# Patient Record
Sex: FEMALE | Race: WHITE | Employment: OTHER | URBAN - NONMETROPOLITAN AREA
[De-identification: names, ages, dates, MRNs, and addresses within clinical notes are randomized per-mention and may not be internally consistent; named-entity substitution may affect disease eponyms.]

---

## 2017-01-19 LAB
LEFT VENTRICULAR EJECTION FRACTION MODE: NORMAL
LV EF: 55 %

## 2018-10-30 ENCOUNTER — OFFICE VISIT (OUTPATIENT)
Dept: CARDIOLOGY CLINIC | Age: 83
End: 2018-10-30
Payer: MEDICARE

## 2018-10-30 VITALS
HEIGHT: 56 IN | HEART RATE: 71 BPM | WEIGHT: 183 LBS | BODY MASS INDEX: 41.17 KG/M2 | RESPIRATION RATE: 18 BRPM | DIASTOLIC BLOOD PRESSURE: 70 MMHG | SYSTOLIC BLOOD PRESSURE: 138 MMHG

## 2018-10-30 DIAGNOSIS — R60.9 PERIPHERAL EDEMA: ICD-10-CM

## 2018-10-30 DIAGNOSIS — Z03.89 CORONARY ARTERY DISEASE (CAD) EXCLUDED: Primary | ICD-10-CM

## 2018-10-30 DIAGNOSIS — I25.10 ATHEROSCLEROSIS OF NATIVE CORONARY ARTERY OF NATIVE HEART WITHOUT ANGINA PECTORIS: ICD-10-CM

## 2018-10-30 PROBLEM — R60.0 PERIPHERAL EDEMA: Status: ACTIVE | Noted: 2018-10-30

## 2018-10-30 PROCEDURE — 1101F PT FALLS ASSESS-DOCD LE1/YR: CPT | Performed by: INTERNAL MEDICINE

## 2018-10-30 PROCEDURE — 1123F ACP DISCUSS/DSCN MKR DOCD: CPT | Performed by: INTERNAL MEDICINE

## 2018-10-30 PROCEDURE — 93000 ELECTROCARDIOGRAM COMPLETE: CPT | Performed by: INTERNAL MEDICINE

## 2018-10-30 PROCEDURE — G8417 CALC BMI ABV UP PARAM F/U: HCPCS | Performed by: INTERNAL MEDICINE

## 2018-10-30 PROCEDURE — 1090F PRES/ABSN URINE INCON ASSESS: CPT | Performed by: INTERNAL MEDICINE

## 2018-10-30 PROCEDURE — G8484 FLU IMMUNIZE NO ADMIN: HCPCS | Performed by: INTERNAL MEDICINE

## 2018-10-30 PROCEDURE — 1036F TOBACCO NON-USER: CPT | Performed by: INTERNAL MEDICINE

## 2018-10-30 PROCEDURE — G8427 DOCREV CUR MEDS BY ELIG CLIN: HCPCS | Performed by: INTERNAL MEDICINE

## 2018-10-30 PROCEDURE — 4040F PNEUMOC VAC/ADMIN/RCVD: CPT | Performed by: INTERNAL MEDICINE

## 2018-10-30 PROCEDURE — G8598 ASA/ANTIPLAT THER USED: HCPCS | Performed by: INTERNAL MEDICINE

## 2018-10-30 PROCEDURE — 99204 OFFICE O/P NEW MOD 45 MIN: CPT | Performed by: INTERNAL MEDICINE

## 2018-10-30 RX ORDER — PRAVASTATIN SODIUM 40 MG
TABLET ORAL
COMMUNITY
Start: 2009-06-19 | End: 2018-10-30

## 2018-12-14 LAB
CREATININE: 1.3 MG/DL
POTASSIUM (K+): 4.5

## 2019-04-22 RX ORDER — UBIDECARENONE 75 MG
CAPSULE ORAL
COMMUNITY
End: 2019-04-22 | Stop reason: SDUPTHER

## 2019-04-22 RX ORDER — SERTRALINE HYDROCHLORIDE 25 MG/1
TABLET, FILM COATED ORAL
Refills: 2 | COMMUNITY
Start: 2019-03-25 | End: 2019-05-14 | Stop reason: SDUPTHER

## 2019-04-22 RX ORDER — TORSEMIDE 10 MG/1
TABLET ORAL
COMMUNITY
Start: 2018-10-12 | End: 2019-06-21 | Stop reason: SDUPTHER

## 2019-05-14 ENCOUNTER — OFFICE VISIT (OUTPATIENT)
Dept: FAMILY MEDICINE CLINIC | Age: 84
End: 2019-05-14
Payer: MEDICARE

## 2019-05-14 VITALS
HEART RATE: 65 BPM | DIASTOLIC BLOOD PRESSURE: 78 MMHG | OXYGEN SATURATION: 95 % | HEIGHT: 60 IN | BODY MASS INDEX: 37.69 KG/M2 | TEMPERATURE: 97 F | SYSTOLIC BLOOD PRESSURE: 138 MMHG | WEIGHT: 192 LBS

## 2019-05-14 VITALS
DIASTOLIC BLOOD PRESSURE: 86 MMHG | BODY MASS INDEX: 39.31 KG/M2 | SYSTOLIC BLOOD PRESSURE: 142 MMHG | WEIGHT: 195 LBS | TEMPERATURE: 97.6 F | HEART RATE: 84 BPM | HEIGHT: 59 IN

## 2019-05-14 DIAGNOSIS — R60.9 PERIPHERAL EDEMA: ICD-10-CM

## 2019-05-14 DIAGNOSIS — E78.2 MIXED HYPERLIPIDEMIA: ICD-10-CM

## 2019-05-14 DIAGNOSIS — I10 ESSENTIAL HYPERTENSION: Primary | ICD-10-CM

## 2019-05-14 DIAGNOSIS — M15.9 PRIMARY OSTEOARTHRITIS INVOLVING MULTIPLE JOINTS: ICD-10-CM

## 2019-05-14 DIAGNOSIS — F41.1 GENERALIZED ANXIETY DISORDER: ICD-10-CM

## 2019-05-14 DIAGNOSIS — G89.4 CHRONIC PAIN SYNDROME: ICD-10-CM

## 2019-05-14 DIAGNOSIS — K21.9 GASTROESOPHAGEAL REFLUX DISEASE WITHOUT ESOPHAGITIS: ICD-10-CM

## 2019-05-14 DIAGNOSIS — Z86.73 HISTORY OF STROKE WITHOUT RESIDUAL DEFICITS: ICD-10-CM

## 2019-05-14 DIAGNOSIS — N18.30 CHRONIC RENAL INSUFFICIENCY, STAGE III (MODERATE) (HCC): ICD-10-CM

## 2019-05-14 PROBLEM — M19.90 OSTEOARTHRITIS: Status: ACTIVE | Noted: 2019-05-14

## 2019-05-14 PROCEDURE — 99214 OFFICE O/P EST MOD 30 MIN: CPT | Performed by: NURSE PRACTITIONER

## 2019-05-14 RX ORDER — PANTOPRAZOLE SODIUM 40 MG/1
40 TABLET, DELAYED RELEASE ORAL DAILY
COMMUNITY
End: 2019-05-14 | Stop reason: SDUPTHER

## 2019-05-14 RX ORDER — CLOPIDOGREL BISULFATE 75 MG/1
75 TABLET ORAL DAILY
Qty: 90 TABLET | Refills: 2 | Status: SHIPPED | OUTPATIENT
Start: 2019-05-14 | End: 2019-05-14

## 2019-05-14 RX ORDER — PANTOPRAZOLE SODIUM 40 MG/1
40 TABLET, DELAYED RELEASE ORAL DAILY
Qty: 90 TABLET | Refills: 2 | Status: SHIPPED | OUTPATIENT
Start: 2019-05-14 | End: 2019-11-19 | Stop reason: SDUPTHER

## 2019-05-14 RX ORDER — HYDROCODONE BITARTRATE AND ACETAMINOPHEN 7.5; 325 MG/1; MG/1
1 TABLET ORAL 2 TIMES DAILY PRN
Qty: 45 TABLET | Refills: 0 | Status: SHIPPED | OUTPATIENT
Start: 2019-05-14 | End: 2019-06-21 | Stop reason: SDUPTHER

## 2019-05-14 RX ORDER — PRAVASTATIN SODIUM 40 MG
60 TABLET ORAL DAILY
Qty: 90 TABLET | Refills: 2 | Status: SHIPPED | OUTPATIENT
Start: 2019-05-14 | End: 2019-12-20 | Stop reason: SDUPTHER

## 2019-05-14 RX ORDER — METOPROLOL SUCCINATE 25 MG/1
50 TABLET, EXTENDED RELEASE ORAL NIGHTLY
Qty: 180 TABLET | Refills: 2 | Status: SHIPPED | OUTPATIENT
Start: 2019-05-14 | End: 2019-11-19 | Stop reason: SDUPTHER

## 2019-05-14 RX ORDER — LIDOCAINE 50 MG/G
1 PATCH TOPICAL DAILY
COMMUNITY
End: 2019-06-21

## 2019-05-14 RX ORDER — CLOPIDOGREL BISULFATE 75 MG/1
75 TABLET ORAL DAILY
Qty: 90 TABLET | Refills: 2 | Status: SHIPPED | OUTPATIENT
Start: 2019-05-14 | End: 2019-11-19 | Stop reason: SDUPTHER

## 2019-05-14 RX ORDER — SERTRALINE HYDROCHLORIDE 25 MG/1
TABLET, FILM COATED ORAL
Qty: 60 TABLET | Refills: 2 | Status: SHIPPED | OUTPATIENT
Start: 2019-05-14 | End: 2019-08-23 | Stop reason: SDUPTHER

## 2019-05-14 ASSESSMENT — PATIENT HEALTH QUESTIONNAIRE - PHQ9
SUM OF ALL RESPONSES TO PHQ QUESTIONS 1-9: 2
1. LITTLE INTEREST OR PLEASURE IN DOING THINGS: 1
SUM OF ALL RESPONSES TO PHQ9 QUESTIONS 1 & 2: 2
2. FEELING DOWN, DEPRESSED OR HOPELESS: 1
1. LITTLE INTEREST OR PLEASURE IN DOING THINGS: 1
SUM OF ALL RESPONSES TO PHQ QUESTIONS 1-9: 2
2. FEELING DOWN, DEPRESSED OR HOPELESS: 1
SUM OF ALL RESPONSES TO PHQ QUESTIONS 1-9: 2
SUM OF ALL RESPONSES TO PHQ QUESTIONS 1-9: 2
SUM OF ALL RESPONSES TO PHQ9 QUESTIONS 1 & 2: 2

## 2019-05-14 ASSESSMENT — ENCOUNTER SYMPTOMS
ALLERGIC/IMMUNOLOGIC NEGATIVE: 1
CHEST TIGHTNESS: 0
DIARRHEA: 0
COUGH: 0
CONSTIPATION: 0
ABDOMINAL PAIN: 0
BACK PAIN: 1
RHINORRHEA: 1
NAUSEA: 0
EYE DISCHARGE: 1
SHORTNESS OF BREATH: 0

## 2019-05-14 NOTE — PROGRESS NOTES
2019     Luis Smith (:  1929) is a 80 y.o. female, here for evaluation of the following medical concerns:  Chief Complaint   Patient presents with    Medication Refill    Back Pain        HPI:  80 y.o. female presents  For med refill and check up  Patient accompanied by Anselmo Whitten    HPI: c/o ears itching and picks her SK constantly. Trying to loose weight -discussed diet  Needs referral since cardiologist retired due this 2019- referral in  Saw eye doctor and still having issues especially with watering-suggest some saline washes  Swelling in legs very minimal  Sleeping in chair and bed, feeling more rested but has wild dreams sometimes  Still lightheaded on occasion  Having heartburn-better with small meals  Botox was doing well with urinary issues , now wearing off but does not want to go back  Feet will burn and sting. Has seen vascular as well. Legs continually feel \"frozen\"  Seeing counselor and still doing ok. But still worries constantly  Got flu shot  Has fall plan and surrogate decision maker    Health Maintenance:  carotid us - (2013) no hemodynamically sig stenosis  Swallowing study/upper GI and Ba enema -  Normal except for delayed emptying to colon  ct scan of head - (2015) no acute infarct, age related ischemic changes  2d echo - (2015) normal left ventircualr size with mild concentric left ventricular hypertrophy, stage 2 diastolic  dysfunction, mild mitral valve insuff, mod lae.   carotid us - (2015) <50% narrowing b/l carotid arteries  mri scan of brain - (2015) old lacunar infarcts left thalamus and basal ganglia, periventricular white matter disease  carotid us -  <30% stenosis  ct scan of head -  Neg except microvascular changes  Duplex venous ultrasound -  negative  carotid us -  negative  Mra -  atrophy but otherwise negative  carotid us -  <50%stenosis both internal arteries  ct scan of head -  Chronic sm vessel changes and infarct  2d echo - 1-2017 normal left ventricular size with mild concentric left ventricular hypertrophy, stage 2 diastolic  dysfunction, mild mitral valve insuff, mod lae, aortic sclerosis. ? effusion but determined insignificant as a poor study. ct scan of head - (2/2017) no acute infarct, ischemic white matter changes    Past Medical History:   Diagnosis Date    Anxiety with depression     Arrhythmia     PAF    CAD (coronary artery disease)     Chronic kidney disease     stage 3    Chronic pain syndrome     [facility] ([facility] [address]     History of CVA (cerebrovascular accident) without residual deficits     Hyperlipidemia     Hypertension     Osteoarthritis     Seizures (HCC)     CHILDHOOD    Shingles     Sleep apnea     TIA (transient ischemic attack)        Current Outpatient Medications on File Prior to Visit   Medication Sig Dispense Refill    vitamin D (CHOLECALCIFEROL) 1000 UNIT TABS tablet Take by mouth      Multiple Vitamins-Minerals (CENTRUM SILVER 50+MEN PO) Take by mouth      torsemide (DEMADEX) 10 MG tablet Take by mouth      UNABLE TO FIND Unknown name -- med for overactive bladder.  aspirin 325 MG tablet Take 325 mg by mouth daily      Cyanocobalamin (B-12) 2500 MCG TABS Take 1 tablet by mouth daily      donepezil (ARICEPT) 5 MG tablet Take 1 tablet by mouth daily (with breakfast) 30 tablet 6     No current facility-administered medications on file prior to visit. Allergies   Allergen Reactions    Bactrim [Sulfamethoxazole-Trimethoprim]     Lyrica [Pregabalin]     Prednisone     Sulfa Antibiotics Rash       No family history on file.     Past Surgical History:   Procedure Laterality Date    CARPAL TUNNEL RELEASE      DIAGNOSTIC CARDIAC CATH LAB PROCEDURE      drug eluting stent    HYSTERECTOMY      LUMBAR LAMINECTOMY         Social History     Tobacco Use    Smoking status: Passive Smoke Exposure - Never Smoker    Smokeless tobacco: Never Used  Tobacco comment:  had smoked. Substance Use Topics    Alcohol use: Not on file    Drug use: Not on file       ROS:    maker  Refuses mammogram.  ROS:  Const: Denies malaise. General health stated as poor. CV: Reports CAD, hyperlipidemia and hypertension, but denies fatigue and orthopnea. Resp: Denies cough, SOB and wheezing. GI: Denies abdominal pain, constipation, nausea and vomiting. Musculo: Reports ambulatory dysfunction, arthralgias, arthritis, myalgia, back pain, right shoulder pain, left hip pain and  joint pain. Neuro: Reports difficulty with balance, dizziness and transient ischemic attack but denies paralysis, paresthesia, room  spinning, vertigo and muscular weakness  Hx:On 1- she was fine at 9pm and at 2315 \"walked\" over to son's house mumbling because couldn't get phone  dialed. Family noted rt side facial droop. Stated she has left leg numbness and tingling as well. Family found BP elevated  and . Brought to ED and while there , sx resolved. Had just had the EEG done by Dr Rhett Palacios earlier that day and had seen her on 1-17-17  Had echo and carotid doppler and CT all unchanged. on 2-3-2017 back in hospital, was seen by trt in [place] due to TIA(weakness and vision disturbance) symptomatology,  sent to [facility] for evaluation, BW, Ct scan was done, no sig changes noted. Did see Dr Yoav Nicole neurologist at [facility] [place] 2017 and he stated the same things that have been stated to her by all of her  provider's there is no carotid artery issue, there is sm vessel disease, continue medications, the feeling in her head may  never change. Psych: Reports anxiety, memory loss and  Review of Systems   Constitutional: Positive for activity change and fatigue. States health is poor   HENT: Positive for congestion and rhinorrhea. Eyes: Positive for discharge. Respiratory: Negative for cough, chest tightness and shortness of breath.     Cardiovascular: Positive for palpitations and leg swelling. Negative for chest pain. CAD, HTN, hyperlipidemia   Gastrointestinal: Negative for abdominal pain, constipation, diarrhea and nausea. Endocrine: Negative. Genitourinary: Positive for enuresis and frequency. Musculoskeletal: Positive for arthralgias, back pain, gait problem, joint swelling and myalgias. Left hip, right shoulder   Skin: Negative. Allergic/Immunologic: Negative. Neurological: Positive for weakness, light-headedness and numbness. Negative for dizziness, tremors and headaches. Reports difficulty with balance, dizziness and transient ischemic attack but denies paralysis, paresthesia, room  spinning, vertigo and muscular weakness  Hx:On 1- she was fine at 9pm and at 2315 \"walked\" over to son's house mumbling because couldn't get phone  dialed. Family noted rt side facial droop. Stated she has left leg numbness and tingling as well. Family found BP elevated  and . Brought to ED and while there , sx resolved. Had just had the EEG done by Dr Iris Navarro earlier that day and had seen her on 1-17-17  Had echo and carotid doppler and CT all unchanged. on 2-3-2017 back in hospital, was seen by trt in [facility] due to TIA(weakness and vision disturbance) symptomatology,  sent to [facility] for evaluation, BW, Ct scan was done, no sig changes noted. Did see Dr Liza Dale neurologist at [facility] [place] 2017 and he stated the same things that have been stated to her by all of her  provider's there is no carotid artery issue, there is sm vessel disease, continue medications, the feeling in her head may  never change. Hematological: Bruises/bleeds easily. Psychiatric/Behavioral: Positive for decreased concentration, dysphoric mood and sleep disturbance. The patient is nervous/anxious.             Vitals:    05/14/19 1111   BP: 138/78   Pulse: 65   Temp: 97 °F (36.1 °C)   SpO2: 95%   Weight: 192 lb (87.1 kg)   Height: 5' (1.524 m)     Estimated body mass index is 37.5 kg/m² as calculated from the following:    Height as of this encounter: 5' (1.524 m). Weight as of this encounter: 192 lb (87.1 kg). VS:  Blood pressure 138/78, pulse 65, temperature 97 °F (36.1 °C), height 5' (1.524 m), weight 192 lb (87.1 kg), SpO2 95 %. Physical Exam   Constitutional: She is oriented to person, place, and time. She appears well-developed and well-nourished. No distress. Younger than stated age   HENT:   Head: Normocephalic and atraumatic. Right Ear: Hearing, tympanic membrane, external ear and ear canal normal.   Left Ear: Hearing, tympanic membrane, external ear and ear canal normal.   Nose: Nose normal. No mucosal edema or rhinorrhea. Mouth/Throat: Oropharynx is clear and moist and mucous membranes are normal. No oropharyngeal exudate. Eyes: Pupils are equal, round, and reactive to light. Conjunctivae and EOM are normal.   Neck: Normal range of motion. Neck supple. No JVD present. Carotid bruit is present. No thyromegaly present. Cardiovascular: Normal rate, regular rhythm, normal heart sounds and intact distal pulses. No murmur heard. Pulmonary/Chest: Effort normal and breath sounds normal. No respiratory distress. She has no wheezes. She has no rales. Abdominal: Soft. Bowel sounds are normal. She exhibits no distension and no mass. There is no hepatosplenomegaly. There is no tenderness. colostomy   Musculoskeletal: She exhibits no edema. Musculo: Using walker today and moving swiftly! Upper Extremities: DJD changes and limitation with movement of the  upper extremities bilaterally Hand grasps seem equal. Still some trigger of left 2nd finger. OA changes significant. Permanent incomplete closure of right hand. Spinal curvature Lower Extremities: DJD changes, limitation and pain on  resistance of the left lower extremity. Left hip is higher than the right hip., trace edema of both LE slightly >rt than left. Neurological: She is alert and oriented to person, place, and time.  She discussed the side effects and narcotic policy with this patient and the patient has demonstrated understanding. A follow up appointment will be scheduled        No orders of the defined types were placed in this encounter. Return in about 1 month (around 6/11/2019) for medication refill, Routine office visit. An electronic signature was used to authenticate this note.     --LILIA Cotto - CNP on 5/14/2019 at 5:06 PM

## 2019-05-17 DIAGNOSIS — F41.1 GENERALIZED ANXIETY DISORDER: ICD-10-CM

## 2019-05-17 RX ORDER — METOPROLOL SUCCINATE 25 MG/1
12.5 TABLET, EXTENDED RELEASE ORAL NIGHTLY
Qty: 45 TABLET | Refills: 2 | OUTPATIENT
Start: 2019-05-17

## 2019-05-17 RX ORDER — PRAVASTATIN SODIUM 40 MG
60 TABLET ORAL DAILY
Qty: 135 TABLET | Refills: 2 | OUTPATIENT
Start: 2019-05-17

## 2019-05-17 RX ORDER — SERTRALINE HYDROCHLORIDE 25 MG/1
25 TABLET, FILM COATED ORAL DAILY
Qty: 90 TABLET | Refills: 2 | OUTPATIENT
Start: 2019-05-17

## 2019-05-17 NOTE — TELEPHONE ENCOUNTER
Reji Rosado called about the Rxs that were sent over on 5/14:      Pravastatin 40mg 1 1/2 po qd was sent w/ a quantity of #90. New Rx is in the que for # 135 w/ 2 refills     Metoprolol succinate 25mg take 2 tabs nightly Take 0.5mg daily # 180 w/ 2 refills. Previous Rx from Cristel Cullen states that pt is taking 1/2 po q hs. New Rx is in the que for #45 w/ 2 refiils    Sertraline 25mg 1/2 qd #60 w/ 2 refills/. Per [facility] pt was taking 1/2 @hs x 2 nights then increased to 1 po qhs. New Rx for #90 w/ 2 refills is in the que.

## 2019-06-10 ENCOUNTER — PROCEDURE VISIT (OUTPATIENT)
Dept: PODIATRY | Age: 84
End: 2019-06-10
Payer: MEDICARE

## 2019-06-10 VITALS
DIASTOLIC BLOOD PRESSURE: 74 MMHG | WEIGHT: 192 LBS | BODY MASS INDEX: 37.5 KG/M2 | SYSTOLIC BLOOD PRESSURE: 134 MMHG | TEMPERATURE: 96.9 F

## 2019-06-10 DIAGNOSIS — M79.674 PAIN IN TOE OF RIGHT FOOT: ICD-10-CM

## 2019-06-10 DIAGNOSIS — B35.1 TINEA UNGUIUM: Primary | ICD-10-CM

## 2019-06-10 DIAGNOSIS — I73.9 PERIPHERAL VASCULAR DISEASE, UNSPECIFIED (HCC): ICD-10-CM

## 2019-06-10 DIAGNOSIS — M79.675 PAIN IN LEFT TOE(S): ICD-10-CM

## 2019-06-10 DIAGNOSIS — R26.2 DIFFICULTY WALKING: ICD-10-CM

## 2019-06-10 PROCEDURE — 11721 DEBRIDE NAIL 6 OR MORE: CPT | Performed by: PODIATRIST

## 2019-06-10 ASSESSMENT — ENCOUNTER SYMPTOMS
RESPIRATORY NEGATIVE: 1
GASTROINTESTINAL NEGATIVE: 1
EYES NEGATIVE: 1

## 2019-06-10 NOTE — PROGRESS NOTES
tablet Take 1 tablet by mouth daily 90 tablet 2    vitamin D (CHOLECALCIFEROL) 1000 UNIT TABS tablet Take by mouth      Multiple Vitamins-Minerals (CENTRUM SILVER 50+MEN PO) Take by mouth      torsemide (DEMADEX) 10 MG tablet Take by mouth      UNABLE TO FIND Unknown name -- med for overactive bladder.  aspirin 325 MG tablet Take 325 mg by mouth daily      Cyanocobalamin (B-12) 2500 MCG TABS Take 1 tablet by mouth daily      donepezil (ARICEPT) 5 MG tablet Take 1 tablet by mouth daily (with breakfast) 30 tablet 6     No current facility-administered medications on file prior to visit. Review of Systems   Constitutional: Negative. HENT: Negative. Eyes: Negative. Respiratory: Negative. Cardiovascular: Negative. Gastrointestinal: Negative. Endocrine: Negative. Genitourinary: Negative. Musculoskeletal: Negative. Objective:  General: AAO x 3 in NAD.     Derm  Toenail Description  Sites of Onychomycosis Involvement (Check affected area)  [x] [x] [x] [x] [x] [x] [x] [x] [x] [x]  5 4 3 2 1 1 2 3 4 5                          Right                                        Left    Thickness  [x] [x] [x] [x] [x] [x] [x] [x] [x] [x]  5 4 3 2 1 1 2 3 4 5                         Right                                        Left    Dystrophic Changes   [x] [x] [x] [x] [x] [x] [x] [x] [x] [x]  5 4 3 2 1 1 2 3 4 5                         Right                                        Left    Color  [x] [x] [x] [x] [x] [x] [x] [x] [x] [x]  5 4 3 2 1 1 2 3 4 5                          Right                                        Left    Incurvation/Ingrowin   [x] [x] [x] [x] [x] [x] [x] [x] [x] [x]  5 4 3 2 1 1 2 3 4 5                         Right                                        Left    Inflammation/Pain   [x] [x] [x] [x] [x] [x] [x] [x] [x] [x]  5 4 3  2 1 1 2 3 4 5                         Right                                        Left      Nails that are described above are all elongated thickened pitting mycotic yellowish incurvated causing pain with both shoe gear. Palpation1-5  Bilateral     Dermatologic Exam:hair loss noted  lower extremity    Skin lesion/ulceration   Skin   Callus neg  Musculoskeletal:     1st MPJ ROM normal, Bilateral.  Muscle strength 5/5, Bilateral.  Pain present upon palpation of toenails 1-5  Bilateral., Bilateral.  Ankle ROM normal,Bilateral.    Dorsally contracted digits, Bilateral.     Vascular:  Pulses   bilateral DP absent    PT absent    Neurological:  Sensation present to light touch to level of digits, Bilateral.    Foot Exam    Right Foot/Ankle     Inspection and Palpation  Hallux valgus: yes  Hallux limitus: yes  Skin Exam: abnormal color; Neurovascular  Dorsalis pedis: absent  Posterior tibial: absent      Left Foot/Ankle      Inspection and Palpation  Hallux valgus: yes  Skin Exam: abnormal color; Neurovascular  Dorsalis pedis: absent  Posterior tibial: absent             Ortho Exam  Q7   []Yes    []No                Q8   [x]Yes    []No                     Q9   []Yes    []No  Assessment:  80 y.o. female with:   1. Tinea unguium    2. Peripheral vascular disease, unspecified ([address]    3. Pain in toe of right foot    4. Pain in left toe(s)    5. Difficulty walking     No orders of the defined types were placed in this encounter. Plan:   Pt was evaluated and examined. Patient was given personalized discharge instructions. Nails 1-10 were debrided in length and thickness sharply with a nail nipper and  without incident. Pt will follow up in 9 weeks or sooner if any problems arise. Diagnosis was discussed with the pt and all of their questions were answered in detail. Proper foot hygiene and care was discussed with the pt. Patient to check feet daily and contact the office with any questions/problems/concerns. Other comorbidity noted and will be managed by PCP. Pain waiver discussed with patient and confirmed.

## 2019-06-13 ENCOUNTER — TELEPHONE (OUTPATIENT)
Dept: FAMILY MEDICINE CLINIC | Age: 84
End: 2019-06-13

## 2019-06-21 ENCOUNTER — OFFICE VISIT (OUTPATIENT)
Dept: FAMILY MEDICINE CLINIC | Age: 84
End: 2019-06-21
Payer: MEDICARE

## 2019-06-21 VITALS
TEMPERATURE: 96.9 F | WEIGHT: 184 LBS | OXYGEN SATURATION: 96 % | DIASTOLIC BLOOD PRESSURE: 74 MMHG | BODY MASS INDEX: 37.09 KG/M2 | SYSTOLIC BLOOD PRESSURE: 134 MMHG | HEART RATE: 87 BPM | HEIGHT: 59 IN

## 2019-06-21 DIAGNOSIS — F41.1 GENERALIZED ANXIETY DISORDER: ICD-10-CM

## 2019-06-21 DIAGNOSIS — G89.4 CHRONIC PAIN SYNDROME: Primary | ICD-10-CM

## 2019-06-21 DIAGNOSIS — I73.9 PERIPHERAL VASCULAR DISEASE, UNSPECIFIED (HCC): ICD-10-CM

## 2019-06-21 DIAGNOSIS — Z86.73 HISTORY OF STROKE WITHOUT RESIDUAL DEFICITS: ICD-10-CM

## 2019-06-21 DIAGNOSIS — I10 ESSENTIAL HYPERTENSION: ICD-10-CM

## 2019-06-21 PROCEDURE — 99214 OFFICE O/P EST MOD 30 MIN: CPT | Performed by: NURSE PRACTITIONER

## 2019-06-21 RX ORDER — TORSEMIDE 10 MG/1
TABLET ORAL
Qty: 30 TABLET | Refills: 5 | Status: SHIPPED | OUTPATIENT
Start: 2019-06-21 | End: 2019-12-20 | Stop reason: SDUPTHER

## 2019-06-21 RX ORDER — HYDROCODONE BITARTRATE AND ACETAMINOPHEN 7.5; 325 MG/1; MG/1
1 TABLET ORAL 2 TIMES DAILY PRN
Qty: 45 TABLET | Refills: 0 | Status: SHIPPED | OUTPATIENT
Start: 2019-06-21 | End: 2019-07-19 | Stop reason: SDUPTHER

## 2019-06-21 RX ORDER — DONEPEZIL HYDROCHLORIDE 5 MG/1
5 TABLET, FILM COATED ORAL
Qty: 90 TABLET | Refills: 3 | Status: SHIPPED | OUTPATIENT
Start: 2019-06-21 | End: 2019-07-19 | Stop reason: SDUPTHER

## 2019-06-21 ASSESSMENT — ENCOUNTER SYMPTOMS
CHEST TIGHTNESS: 0
NAUSEA: 0
RHINORRHEA: 1
EYE DISCHARGE: 1
BACK PAIN: 1
DIARRHEA: 0
CONSTIPATION: 0
ABDOMINAL PAIN: 0
ALLERGIC/IMMUNOLOGIC NEGATIVE: 1
SHORTNESS OF BREATH: 0
COUGH: 0

## 2019-06-21 NOTE — PROGRESS NOTES
2019     Alessandra Marcus (:  1929) is a 80 y.o. female, here for evaluation of the following medical concerns:  Chief Complaint   Patient presents with    Medication Refill        HPI:  80 y.o. female presents  For med refill and check up  Patient accompanied by joe Kim  Trying nurtisystem  H/o ears itching and picks her SK constantly. Needs referral since cardiologist retired due this 2019- appointment in July . Dr [place] eye doctor and still having issues especially with watering-suggest some saline washes  Sleeping in chair and bed, feeling more rested but has wild dreams sometimes  Still lightheaded on occasion  Having heartburn-better with small meals  Botox was doing well with urinary issues , now wearing off but does not want to go back  Feet will burn and sting. Has seen vascular as well. Legs continually feel \"frozen\"  Seeing counselor and still doing ok. But still worries constantly  Got flu shot  Has fall plan and surrogate decision maker. Refuses further mammograms    Health Maintenance:  carotid us - (2013) no hemodynamically sig stenosis  Swallowing study/upper GI and Ba enema -  Normal except for delayed emptying to colon  ct scan of head - (2015) no acute infarct, age related ischemic changes  2d echo - (2015) normal left ventircualr size with mild concentric left ventricular hypertrophy, stage 2 diastolic  dysfunction, mild mitral valve insuff, mod lae.   carotid us - (2015) <50% narrowing b/l carotid arteries  mri scan of brain - (2015) old lacunar infarcts left thalamus and basal ganglia, periventricular white matter disease  carotid us -  <30% stenosis  ct scan of head -  Neg except microvascular changes  Duplex venous ultrasound -  negative  carotid us -  negative  Mra -  atrophy but otherwise negative  carotid us -  <50%stenosis both internal arteries  ct scan of head -  Chronic sm vessel changes and infarct  2d echo - 1-2017 normal left ventricular size with mild concentric left ventricular hypertrophy, stage 2 diastolic  dysfunction, mild mitral valve insuff, mod lae, aortic sclerosis. ? effusion but determined insignificant as a poor study. ct scan of head - (2/2017) no acute infarct, ischemic white matter changes    Past Medical History:   Diagnosis Date    Anxiety with depression     Arrhythmia     PAF    CAD (coronary artery disease)     Chronic kidney disease     stage 3    Chronic pain syndrome     [facility] ([facility] [address]     History of CVA (cerebrovascular accident) without residual deficits     Hyperlipidemia     Hypertension     Osteoarthritis     Seizures (HCC)     CHILDHOOD    Shingles     Sleep apnea     TIA (transient ischemic attack)        Current Outpatient Medications on File Prior to Visit   Medication Sig Dispense Refill    cycloSPORINE (RESTASIS OP) Apply 1 drop to eye 2 times daily      sertraline (ZOLOFT) 25 MG tablet 1/2 daily (Patient taking differently: Take 25 mg by mouth daily 1/2 daily) 60 tablet 2    pravastatin (PRAVACHOL) 40 MG tablet Take 1.5 tablets by mouth daily 90 tablet 2    pantoprazole (PROTONIX) 40 MG tablet Take 1 tablet by mouth daily 90 tablet 2    metoprolol succinate (TOPROL XL) 25 MG extended release tablet Take 2 tablets by mouth nightly TAKE 0.5 MG DAILY (Patient taking differently: Take 12.5 mg by mouth nightly TAKE 0.5 MG DAILY) 180 tablet 2    clopidogrel (PLAVIX) 75 MG tablet Take 1 tablet by mouth daily 90 tablet 2    vitamin D (CHOLECALCIFEROL) 1000 UNIT TABS tablet Take by mouth      Multiple Vitamins-Minerals (CENTRUM SILVER 50+MEN PO) Take by mouth      aspirin 325 MG tablet Take 325 mg by mouth daily      Cyanocobalamin (B-12) 2500 MCG TABS Take 1 tablet by mouth daily      UNABLE TO FIND Unknown name -- med for overactive bladder. No current facility-administered medications on file prior to visit.         Allergies   Allergen Reactions    Bactrim [Sulfamethoxazole-Trimethoprim]     Lyrica [Pregabalin]     Prednisone     Sulfa Antibiotics Rash       No family history on file. Past Surgical History:   Procedure Laterality Date    BARTHOLIN GLAND CYST EXCISION      CARPAL TUNNEL RELEASE      CATARACT REMOVAL      COLECTOMY      with colostomy    DIAGNOSTIC CARDIAC CATH LAB PROCEDURE      drug eluting stent proximal RCA and diagonal branch    HYSTERECTOMY      LUMBAR LAMINECTOMY         Social History     Tobacco Use    Smoking status: Passive Smoke Exposure - Never Smoker    Smokeless tobacco: Never Used    Tobacco comment:  had smoked. Substance Use Topics    Alcohol use: Not on file    Drug use: Not on file       ROS:    Review of Systems   Constitutional: Positive for activity change and fatigue. States health is poor   HENT: Positive for congestion and rhinorrhea. Eyes: Positive for discharge. Respiratory: Negative for cough, chest tightness and shortness of breath. Cardiovascular: Positive for palpitations and leg swelling. Negative for chest pain. CAD, HTN, hyperlipidemia   Gastrointestinal: Negative for abdominal pain, constipation, diarrhea and nausea. Endocrine: Negative. Genitourinary: Positive for enuresis and frequency. Musculoskeletal: Positive for arthralgias, back pain, gait problem, joint swelling and myalgias. Left hip, right shoulder   Skin: Negative. Allergic/Immunologic: Negative. Neurological: Positive for weakness, light-headedness and numbness. Negative for dizziness, tremors and headaches. Reports difficulty with balance, dizziness and transient ischemic attack but denies paralysis, paresthesia, room  spinning, vertigo and muscular weakness  Hx:On 1- she was fine at 9pm and at 2315 \"walked\" over to son's house mumbling because couldn't get phone  dialed. Family noted rt side facial droop.  Stated she has left leg numbness and tingling as well. Family found BP elevated  and . Brought to ED and while there , sx resolved. Had just had the EEG done by Dr Shaye Hargrove earlier that day and had seen her on 1-17-17  Had echo and carotid doppler and CT all unchanged. on 2-3-2017 back in hospital, was seen by trt in [facility] due to TIA(weakness and vision disturbance) symptomatology,  sent to [facility] for evaluation, BW, Ct scan was done, no sig changes noted. Did see Dr Maegan James neurologist at [facility] [place] 2017 and he stated the same things that have been stated to her by all of her  provider's there is no carotid artery issue, there is sm vessel disease, continue medications, the feeling in her head may  never change. Hematological: Bruises/bleeds easily. Psychiatric/Behavioral: Positive for decreased concentration, dysphoric mood and sleep disturbance. The patient is nervous/anxious. Vitals:    06/21/19 1440   BP: 134/74   Pulse: 87   Temp: 96.9 °F (36.1 °C)   SpO2: 96%   Weight: 184 lb (83.5 kg)   Height: 4' 11\" (1.499 m)     Estimated body mass index is 37.16 kg/m² as calculated from the following:    Height as of this encounter: 4' 11\" (1.499 m). Weight as of this encounter: 184 lb (83.5 kg). VS:  Blood pressure 134/74, pulse 87, temperature 96.9 °F (36.1 °C), height 4' 11\" (1.499 m), weight 184 lb (83.5 kg), SpO2 96 %. Physical Exam   Constitutional: She is oriented to person, place, and time. She appears well-developed and well-nourished. No distress. Younger than stated age   HENT:   Head: Normocephalic and atraumatic. Right Ear: Hearing, tympanic membrane, external ear and ear canal normal.   Left Ear: Hearing, tympanic membrane, external ear and ear canal normal.   Nose: Nose normal. No mucosal edema or rhinorrhea. Mouth/Throat: Oropharynx is clear and moist and mucous membranes are normal. No oropharyngeal exudate. Eyes: Pupils are equal, round, and reactive to light.  Conjunctivae and EOM are normal.   Neck: Normal at this time. I have reviewed the [place] report and found no suspicion of drug seeking behavior. I have discussed the side effects and narcotic policy with this patient and the patient has demonstrated understanding. A follow up appointment will be scheduled  Clinically stable BP . Continue with current medication regimen. Monitor occasionally at home  No changes in PVD  No stroke issues    No orders of the defined types were placed in this encounter. Return in about 1 month (around 7/19/2019) for Routine office visit. An electronic signature was used to authenticate this note.     --Zonia Carrel, APRN - CNP on 6/21/2019 at 3:09 PM

## 2019-07-02 ENCOUNTER — TELEPHONE (OUTPATIENT)
Dept: FAMILY MEDICINE CLINIC | Age: 84
End: 2019-07-02

## 2019-07-09 ENCOUNTER — OFFICE VISIT (OUTPATIENT)
Dept: CARDIOLOGY CLINIC | Age: 84
End: 2019-07-09
Payer: MEDICARE

## 2019-07-09 VITALS
WEIGHT: 186 LBS | HEIGHT: 56 IN | BODY MASS INDEX: 41.84 KG/M2 | DIASTOLIC BLOOD PRESSURE: 76 MMHG | SYSTOLIC BLOOD PRESSURE: 128 MMHG | RESPIRATION RATE: 18 BRPM | HEART RATE: 62 BPM

## 2019-07-09 DIAGNOSIS — I10 ESSENTIAL HYPERTENSION: Primary | ICD-10-CM

## 2019-07-09 PROCEDURE — G8427 DOCREV CUR MEDS BY ELIG CLIN: HCPCS | Performed by: INTERNAL MEDICINE

## 2019-07-09 PROCEDURE — 1090F PRES/ABSN URINE INCON ASSESS: CPT | Performed by: INTERNAL MEDICINE

## 2019-07-09 PROCEDURE — 99214 OFFICE O/P EST MOD 30 MIN: CPT | Performed by: INTERNAL MEDICINE

## 2019-07-09 PROCEDURE — 93000 ELECTROCARDIOGRAM COMPLETE: CPT | Performed by: INTERNAL MEDICINE

## 2019-07-09 PROCEDURE — G8598 ASA/ANTIPLAT THER USED: HCPCS | Performed by: INTERNAL MEDICINE

## 2019-07-09 PROCEDURE — G8417 CALC BMI ABV UP PARAM F/U: HCPCS | Performed by: INTERNAL MEDICINE

## 2019-07-09 PROCEDURE — 4040F PNEUMOC VAC/ADMIN/RCVD: CPT | Performed by: INTERNAL MEDICINE

## 2019-07-09 PROCEDURE — 1036F TOBACCO NON-USER: CPT | Performed by: INTERNAL MEDICINE

## 2019-07-09 PROCEDURE — 1123F ACP DISCUSS/DSCN MKR DOCD: CPT | Performed by: INTERNAL MEDICINE

## 2019-07-09 SDOH — HEALTH STABILITY: MENTAL HEALTH: HOW OFTEN DO YOU HAVE A DRINK CONTAINING ALCOHOL?: NEVER

## 2019-07-09 NOTE — PATIENT INSTRUCTIONS
· Continue Plavix and change aspirin to 81 mg po daily  · Continue Pravastatin and rest of the current medications  · Follow up with in 6 months.

## 2019-07-19 ENCOUNTER — OFFICE VISIT (OUTPATIENT)
Dept: FAMILY MEDICINE CLINIC | Age: 84
End: 2019-07-19
Payer: MEDICARE

## 2019-07-19 VITALS
HEIGHT: 59 IN | TEMPERATURE: 97.8 F | DIASTOLIC BLOOD PRESSURE: 86 MMHG | SYSTOLIC BLOOD PRESSURE: 138 MMHG | HEART RATE: 82 BPM | OXYGEN SATURATION: 95 % | BODY MASS INDEX: 37.29 KG/M2 | WEIGHT: 185 LBS

## 2019-07-19 DIAGNOSIS — I10 ESSENTIAL HYPERTENSION: ICD-10-CM

## 2019-07-19 DIAGNOSIS — Z86.73 HISTORY OF STROKE WITHOUT RESIDUAL DEFICITS: Primary | ICD-10-CM

## 2019-07-19 DIAGNOSIS — G89.4 CHRONIC PAIN SYNDROME: ICD-10-CM

## 2019-07-19 DIAGNOSIS — I25.10 ATHEROSCLEROSIS OF CORONARY ARTERY OF NATIVE HEART WITHOUT ANGINA PECTORIS, UNSPECIFIED VESSEL OR LESION TYPE: ICD-10-CM

## 2019-07-19 DIAGNOSIS — F41.1 GENERALIZED ANXIETY DISORDER: ICD-10-CM

## 2019-07-19 PROCEDURE — 99214 OFFICE O/P EST MOD 30 MIN: CPT | Performed by: NURSE PRACTITIONER

## 2019-07-19 RX ORDER — HYDROCODONE BITARTRATE AND ACETAMINOPHEN 7.5; 325 MG/1; MG/1
1 TABLET ORAL 2 TIMES DAILY PRN
Qty: 60 TABLET | Refills: 0 | Status: SHIPPED | OUTPATIENT
Start: 2019-07-21 | End: 2019-08-23 | Stop reason: SDUPTHER

## 2019-07-19 RX ORDER — DONEPEZIL HYDROCHLORIDE 5 MG/1
5 TABLET, FILM COATED ORAL
Qty: 90 TABLET | Refills: 2 | Status: SHIPPED | OUTPATIENT
Start: 2019-07-19 | End: 2019-08-23 | Stop reason: SDUPTHER

## 2019-07-19 ASSESSMENT — ENCOUNTER SYMPTOMS
COUGH: 0
BACK PAIN: 1
EYE DISCHARGE: 1
CHEST TIGHTNESS: 0
ABDOMINAL PAIN: 0
RHINORRHEA: 1
SHORTNESS OF BREATH: 0
ALLERGIC/IMMUNOLOGIC NEGATIVE: 1
NAUSEA: 0
CONSTIPATION: 0
DIARRHEA: 0

## 2019-08-23 ENCOUNTER — OFFICE VISIT (OUTPATIENT)
Dept: FAMILY MEDICINE CLINIC | Age: 84
End: 2019-08-23
Payer: MEDICARE

## 2019-08-23 VITALS
WEIGHT: 185 LBS | DIASTOLIC BLOOD PRESSURE: 78 MMHG | BODY MASS INDEX: 37.29 KG/M2 | HEIGHT: 59 IN | SYSTOLIC BLOOD PRESSURE: 120 MMHG | TEMPERATURE: 97.5 F | OXYGEN SATURATION: 95 % | HEART RATE: 72 BPM

## 2019-08-23 DIAGNOSIS — N18.30 CHRONIC RENAL INSUFFICIENCY, STAGE III (MODERATE) (HCC): Primary | ICD-10-CM

## 2019-08-23 DIAGNOSIS — E78.2 MIXED HYPERLIPIDEMIA: ICD-10-CM

## 2019-08-23 DIAGNOSIS — G89.4 CHRONIC PAIN SYNDROME: ICD-10-CM

## 2019-08-23 DIAGNOSIS — F41.1 GENERALIZED ANXIETY DISORDER: ICD-10-CM

## 2019-08-23 DIAGNOSIS — N39.46 MIXED STRESS AND URGE URINARY INCONTINENCE: ICD-10-CM

## 2019-08-23 DIAGNOSIS — E53.8 VITAMIN B 12 DEFICIENCY: ICD-10-CM

## 2019-08-23 DIAGNOSIS — E55.9 VITAMIN D DEFICIENCY: ICD-10-CM

## 2019-08-23 PROCEDURE — 99214 OFFICE O/P EST MOD 30 MIN: CPT | Performed by: NURSE PRACTITIONER

## 2019-08-23 RX ORDER — SERTRALINE HYDROCHLORIDE 25 MG/1
25 TABLET, FILM COATED ORAL DAILY
Qty: 90 TABLET | Refills: 1 | Status: SHIPPED | OUTPATIENT
Start: 2019-08-23 | End: 2022-01-07 | Stop reason: SDUPTHER

## 2019-08-23 RX ORDER — DONEPEZIL HYDROCHLORIDE 5 MG/1
5 TABLET, FILM COATED ORAL
Qty: 90 TABLET | Refills: 5 | Status: SHIPPED | OUTPATIENT
Start: 2019-08-23 | End: 2020-11-16

## 2019-08-23 RX ORDER — HYDROCODONE BITARTRATE AND ACETAMINOPHEN 7.5; 325 MG/1; MG/1
1 TABLET ORAL 2 TIMES DAILY PRN
Qty: 60 TABLET | Refills: 0 | Status: SHIPPED | OUTPATIENT
Start: 2019-08-23 | End: 2019-09-22

## 2019-08-23 ASSESSMENT — ENCOUNTER SYMPTOMS
SHORTNESS OF BREATH: 0
DIARRHEA: 0
NAUSEA: 0
COUGH: 0
RHINORRHEA: 1
ABDOMINAL PAIN: 0
CONSTIPATION: 0
ALLERGIC/IMMUNOLOGIC NEGATIVE: 1
CHEST TIGHTNESS: 0
BACK PAIN: 1
EYE DISCHARGE: 1

## 2019-08-23 NOTE — PROGRESS NOTES
tablet by mouth daily       No current facility-administered medications on file prior to visit. Allergies   Allergen Reactions    Bactrim [Sulfamethoxazole-Trimethoprim]     Lyrica [Pregabalin]     Prednisone     Sulfa Antibiotics Rash       No family history on file. Past Surgical History:   Procedure Laterality Date    BARTHOLIN GLAND CYST EXCISION      CARPAL TUNNEL RELEASE      CATARACT REMOVAL      COLECTOMY      with colostomy    DIAGNOSTIC CARDIAC CATH LAB PROCEDURE      drug eluting stent proximal RCA and diagonal branch    HYSTERECTOMY      LUMBAR LAMINECTOMY         Social History     Tobacco Use    Smoking status: Never Smoker    Smokeless tobacco: Never Used    Tobacco comment:  had smoked. Substance Use Topics    Alcohol use: Never     Frequency: Never    Drug use: Never       ROS:    Review of Systems   Constitutional: Positive for activity change and fatigue. States health is poor   HENT: Positive for congestion and rhinorrhea. Eyes: Positive for discharge. Respiratory: Negative for cough, chest tightness and shortness of breath. Cardiovascular: Positive for palpitations and leg swelling. Negative for chest pain. CAD, HTN, hyperlipidemia   Gastrointestinal: Negative for abdominal pain, constipation, diarrhea and nausea. Endocrine: Negative. Genitourinary: Positive for enuresis and frequency. Musculoskeletal: Positive for arthralgias, back pain, gait problem, joint swelling and myalgias. Left hip, right shoulder   Skin: Negative. Allergic/Immunologic: Negative. Neurological: Positive for weakness, light-headedness and numbness. Negative for dizziness, tremors and headaches.          Reports difficulty with balance, dizziness and transient ischemic attack but denies paralysis, paresthesia, room  spinning, vertigo and muscular weakness  Hx:On 1- she was fine at 9pm and at 2315 \"walked\" over to son's house mumbling because couldn't get phone  dialed. Family noted rt side facial droop. Stated she has left leg numbness and tingling as well. Family found BP elevated  and . Brought to ED and while there , sx resolved. Had just had the EEG done by Dr Radha Gillespie earlier that day and had seen her on 1-17-17  Had echo and carotid doppler and CT all unchanged. on 2-3-2017 back in hospital, was seen by trt in [facility] due to TIA(weakness and vision disturbance) symptomatology,  sent to [address] [place] [address] for evaluation, BW, Ct scan was done, no sig changes noted. Did see Dr Jess Youngblood neurologist at [facility] [place] 2017 and he stated the same things that have been stated to her by all of her  provider's there is no carotid artery issue, there is sm vessel disease, continue medications, the feeling in her head may  never change. Hematological: Bruises/bleeds easily. Psychiatric/Behavioral: Positive for decreased concentration, dysphoric mood and sleep disturbance. The patient is nervous/anxious. Vitals:    08/23/19 1453   BP: 120/78   Site: Right Upper Arm   Position: Sitting   Pulse: 72   Temp: 97.5 °F (36.4 °C)   TempSrc: Temporal   SpO2: 95%   Weight: 185 lb (83.9 kg)   Height: 4' 11\" (1.499 m)     Estimated body mass index is 37.37 kg/m² as calculated from the following:    Height as of this encounter: 4' 11\" (1.499 m). Weight as of this encounter: 185 lb (83.9 kg). VS:  Blood pressure 120/78, pulse 72, temperature 97.5 °F (36.4 °C), temperature source Temporal, height 4' 11\" (1.499 m), weight 185 lb (83.9 kg), SpO2 95 %. Physical Exam   Constitutional: She is oriented to person, place, and time. She appears well-developed and well-nourished. No distress. Younger than stated age   HENT:   Head: Normocephalic and atraumatic. Right Ear: External ear normal.   Left Ear: External ear normal.   Nose: Nose normal. No mucosal edema or rhinorrhea.    Mouth/Throat: Oropharynx is clear and moist and mucous membranes are normal. No

## 2019-09-18 ENCOUNTER — PROCEDURE VISIT (OUTPATIENT)
Dept: PODIATRY | Age: 84
End: 2019-09-18
Payer: MEDICARE

## 2019-09-18 VITALS
SYSTOLIC BLOOD PRESSURE: 132 MMHG | BODY MASS INDEX: 37.37 KG/M2 | TEMPERATURE: 97.2 F | WEIGHT: 185 LBS | DIASTOLIC BLOOD PRESSURE: 82 MMHG

## 2019-09-18 DIAGNOSIS — I73.9 PERIPHERAL VASCULAR DISEASE, UNSPECIFIED (HCC): ICD-10-CM

## 2019-09-18 DIAGNOSIS — M79.675 PAIN IN LEFT TOE(S): ICD-10-CM

## 2019-09-18 DIAGNOSIS — M79.674 PAIN IN TOE OF RIGHT FOOT: ICD-10-CM

## 2019-09-18 DIAGNOSIS — B35.1 TINEA UNGUIUM: Primary | ICD-10-CM

## 2019-09-18 PROCEDURE — 11721 DEBRIDE NAIL 6 OR MORE: CPT | Performed by: PODIATRIST

## 2019-09-18 ASSESSMENT — ENCOUNTER SYMPTOMS
EYES NEGATIVE: 1
RESPIRATORY NEGATIVE: 1
GASTROINTESTINAL NEGATIVE: 1

## 2019-09-24 ENCOUNTER — OFFICE VISIT (OUTPATIENT)
Dept: FAMILY MEDICINE CLINIC | Age: 84
End: 2019-09-24
Payer: MEDICARE

## 2019-09-24 ENCOUNTER — HOSPITAL ENCOUNTER (OUTPATIENT)
Age: 84
Discharge: HOME | End: 2019-09-26
Payer: MEDICARE

## 2019-09-24 VITALS
OXYGEN SATURATION: 96 % | HEART RATE: 62 BPM | TEMPERATURE: 98 F | HEIGHT: 58 IN | WEIGHT: 175 LBS | SYSTOLIC BLOOD PRESSURE: 138 MMHG | DIASTOLIC BLOOD PRESSURE: 78 MMHG | BODY MASS INDEX: 36.73 KG/M2

## 2019-09-24 DIAGNOSIS — E53.8 VITAMIN B 12 DEFICIENCY: ICD-10-CM

## 2019-09-24 DIAGNOSIS — E55.9 VITAMIN D DEFICIENCY: ICD-10-CM

## 2019-09-24 DIAGNOSIS — G89.4 CHRONIC PAIN SYNDROME: Primary | ICD-10-CM

## 2019-09-24 DIAGNOSIS — E78.2 MIXED HYPERLIPIDEMIA: ICD-10-CM

## 2019-09-24 DIAGNOSIS — F41.1 GENERALIZED ANXIETY DISORDER: ICD-10-CM

## 2019-09-24 DIAGNOSIS — Z86.73 HISTORY OF STROKE WITHOUT RESIDUAL DEFICITS: ICD-10-CM

## 2019-09-24 DIAGNOSIS — G89.4 CHRONIC PAIN SYNDROME: ICD-10-CM

## 2019-09-24 LAB
ALBUMIN SERPL-MCNC: 4.1 G/DL (ref 3.5–5.2)
ALP BLD-CCNC: 70 U/L (ref 35–104)
ALT SERPL-CCNC: 8 U/L (ref 0–32)
AMPHETAMINE SCREEN, URINE: NOT DETECTED
ANION GAP SERPL CALCULATED.3IONS-SCNC: 17 MMOL/L (ref 7–16)
AST SERPL-CCNC: 18 U/L (ref 0–31)
BARBITURATE SCREEN URINE: NOT DETECTED
BASOPHILS ABSOLUTE: 0.04 E9/L (ref 0–0.2)
BASOPHILS RELATIVE PERCENT: 0.5 % (ref 0–2)
BENZODIAZEPINE SCREEN, URINE: NOT DETECTED
BILIRUB SERPL-MCNC: 0.4 MG/DL (ref 0–1.2)
BUN BLDV-MCNC: 16 MG/DL (ref 8–23)
CALCIUM SERPL-MCNC: 9.6 MG/DL (ref 8.6–10.2)
CANNABINOID SCREEN URINE: NOT DETECTED
CHLORIDE BLD-SCNC: 104 MMOL/L (ref 98–107)
CHOLESTEROL, TOTAL: 149 MG/DL (ref 0–199)
CO2: 23 MMOL/L (ref 22–29)
COCAINE METABOLITE SCREEN URINE: NOT DETECTED
CREAT SERPL-MCNC: 1.2 MG/DL (ref 0.5–1)
EOSINOPHILS ABSOLUTE: 0.05 E9/L (ref 0.05–0.5)
EOSINOPHILS RELATIVE PERCENT: 0.6 % (ref 0–6)
GFR AFRICAN AMERICAN: 51
GFR NON-AFRICAN AMERICAN: 42 ML/MIN/1.73
GLUCOSE BLD-MCNC: 111 MG/DL (ref 74–99)
HCT VFR BLD CALC: 41.3 % (ref 34–48)
HDLC SERPL-MCNC: 47 MG/DL
HEMOGLOBIN: 12.5 G/DL (ref 11.5–15.5)
IMMATURE GRANULOCYTES #: 0.07 E9/L
IMMATURE GRANULOCYTES %: 0.9 % (ref 0–5)
LDL CHOLESTEROL CALCULATED: 74 MG/DL (ref 0–99)
LYMPHOCYTES ABSOLUTE: 1.05 E9/L (ref 1.5–4)
LYMPHOCYTES RELATIVE PERCENT: 12.9 % (ref 20–42)
Lab: ABNORMAL
MCH RBC QN AUTO: 28.9 PG (ref 26–35)
MCHC RBC AUTO-ENTMCNC: 30.3 % (ref 32–34.5)
MCV RBC AUTO: 95.6 FL (ref 80–99.9)
METHADONE SCREEN, URINE: NOT DETECTED
MONOCYTES ABSOLUTE: 0.43 E9/L (ref 0.1–0.95)
MONOCYTES RELATIVE PERCENT: 5.3 % (ref 2–12)
NEUTROPHILS ABSOLUTE: 6.48 E9/L (ref 1.8–7.3)
NEUTROPHILS RELATIVE PERCENT: 79.8 % (ref 43–80)
OPIATE SCREEN URINE: POSITIVE
PDW BLD-RTO: 13.7 FL (ref 11.5–15)
PHENCYCLIDINE SCREEN URINE: NOT DETECTED
PLATELET # BLD: 76 E9/L (ref 130–450)
PLATELET CONFIRMATION: NORMAL
PMV BLD AUTO: 12.2 FL (ref 7–12)
POTASSIUM SERPL-SCNC: 4.7 MMOL/L (ref 3.5–5)
PROPOXYPHENE SCREEN: NOT DETECTED
RBC # BLD: 4.32 E12/L (ref 3.5–5.5)
SODIUM BLD-SCNC: 144 MMOL/L (ref 132–146)
TOTAL PROTEIN: 7.3 G/DL (ref 6.4–8.3)
TRIGL SERPL-MCNC: 141 MG/DL (ref 0–149)
VITAMIN B-12: 1124 PG/ML (ref 211–946)
VITAMIN D 25-HYDROXY: 50 NG/ML (ref 30–100)
VLDLC SERPL CALC-MCNC: 28 MG/DL
WBC # BLD: 8.1 E9/L (ref 4.5–11.5)

## 2019-09-24 PROCEDURE — G0480 DRUG TEST DEF 1-7 CLASSES: HCPCS

## 2019-09-24 PROCEDURE — G8427 DOCREV CUR MEDS BY ELIG CLIN: HCPCS | Performed by: NURSE PRACTITIONER

## 2019-09-24 PROCEDURE — 80307 DRUG TEST PRSMV CHEM ANLYZR: CPT

## 2019-09-24 PROCEDURE — G8417 CALC BMI ABV UP PARAM F/U: HCPCS | Performed by: NURSE PRACTITIONER

## 2019-09-24 PROCEDURE — 82607 VITAMIN B-12: CPT

## 2019-09-24 PROCEDURE — 80061 LIPID PANEL: CPT

## 2019-09-24 PROCEDURE — 4040F PNEUMOC VAC/ADMIN/RCVD: CPT | Performed by: NURSE PRACTITIONER

## 2019-09-24 PROCEDURE — 1090F PRES/ABSN URINE INCON ASSESS: CPT | Performed by: NURSE PRACTITIONER

## 2019-09-24 PROCEDURE — 1036F TOBACCO NON-USER: CPT | Performed by: NURSE PRACTITIONER

## 2019-09-24 PROCEDURE — 80053 COMPREHEN METABOLIC PANEL: CPT

## 2019-09-24 PROCEDURE — G8598 ASA/ANTIPLAT THER USED: HCPCS | Performed by: NURSE PRACTITIONER

## 2019-09-24 PROCEDURE — 90653 IIV ADJUVANT VACCINE IM: CPT | Performed by: NURSE PRACTITIONER

## 2019-09-24 PROCEDURE — 82306 VITAMIN D 25 HYDROXY: CPT

## 2019-09-24 PROCEDURE — 85025 COMPLETE CBC W/AUTO DIFF WBC: CPT

## 2019-09-24 PROCEDURE — 1123F ACP DISCUSS/DSCN MKR DOCD: CPT | Performed by: NURSE PRACTITIONER

## 2019-09-24 PROCEDURE — 99214 OFFICE O/P EST MOD 30 MIN: CPT | Performed by: NURSE PRACTITIONER

## 2019-09-24 PROCEDURE — G0008 ADMIN INFLUENZA VIRUS VAC: HCPCS | Performed by: NURSE PRACTITIONER

## 2019-09-24 PROCEDURE — 36415 COLL VENOUS BLD VENIPUNCTURE: CPT

## 2019-09-24 RX ORDER — HYDROCODONE BITARTRATE AND ACETAMINOPHEN 7.5; 325 MG/1; MG/1
1 TABLET ORAL 2 TIMES DAILY PRN
Qty: 45 TABLET | Refills: 0 | Status: SHIPPED | OUTPATIENT
Start: 2019-09-24 | End: 2019-10-18 | Stop reason: SDUPTHER

## 2019-09-24 ASSESSMENT — ENCOUNTER SYMPTOMS
DIARRHEA: 0
RHINORRHEA: 1
CHEST TIGHTNESS: 0
BACK PAIN: 1
CONSTIPATION: 0
SHORTNESS OF BREATH: 0
EYE DISCHARGE: 1
COUGH: 0
NAUSEA: 0
ABDOMINAL PAIN: 0
ALLERGIC/IMMUNOLOGIC NEGATIVE: 1

## 2019-09-24 NOTE — PROGRESS NOTES
vitamin D (CHOLECALCIFEROL) 1000 UNIT TABS tablet Take by mouth      Multiple Vitamins-Minerals (CENTRUM SILVER 50+MEN PO) Take by mouth      aspirin 81 MG EC tablet Take 81 mg by mouth       Cyanocobalamin (B-12) 2500 MCG TABS Take 1 tablet by mouth daily       No current facility-administered medications on file prior to visit. Allergies   Allergen Reactions    Bactrim [Sulfamethoxazole-Trimethoprim]     Lyrica [Pregabalin]     Prednisone     Sulfa Antibiotics Rash       Family History   Problem Relation Age of Onset   [facility] Heart Disease Mother         age 80    Colon Cancer Father         age 80       Past Surgical History:   Procedure Laterality Date    BARTHOLIN GLAND CYST EXCISION      CARPAL TUNNEL RELEASE      CATARACT REMOVAL      COLECTOMY      with colostomy    DIAGNOSTIC CARDIAC CATH LAB PROCEDURE      drug eluting stent proximal RCA and diagonal branch    HYSTERECTOMY      LUMBAR LAMINECTOMY         Social History     Tobacco Use    Smoking status: Never Smoker    Smokeless tobacco: Never Used    Tobacco comment:  had smoked. Substance Use Topics    Alcohol use: Never     Frequency: Never    Drug use: Never       ROS:    Review of Systems   Constitutional: Positive for activity change and fatigue. States health is poor   HENT: Positive for congestion and rhinorrhea. Eyes: Positive for discharge. Respiratory: Negative for cough, chest tightness and shortness of breath. Cardiovascular: Positive for palpitations and leg swelling. Negative for chest pain. CAD, HTN, hyperlipidemia   Gastrointestinal: Negative for abdominal pain, constipation, diarrhea and nausea. Endocrine: Negative. Genitourinary: Positive for enuresis and frequency. Musculoskeletal: Positive for arthralgias, back pain, gait problem, joint swelling and myalgias. Left hip, right shoulder   Skin: Negative. Allergic/Immunologic: Negative.     Neurological: Positive for weakness, light-headedness and numbness. Negative for dizziness, tremors and headaches. Reports difficulty with balance, dizziness and transient ischemic attack but denies paralysis, paresthesia, room  spinning, vertigo and muscular weakness  Hx:On 1- she was fine at 9pm and at 2315 \"walked\" over to son's house mumbling because couldn't get phone  dialed. Family noted rt side facial droop. Stated she has left leg numbness and tingling as well. Family found BP elevated  and . Brought to ED and while there , sx resolved. Had just had the EEG done by Dr Drake Masters earlier that day and had seen her on 1-17-17  Had echo and carotid doppler and CT all unchanged. on 2-3-2017 back in hospital, was seen by trt in [facility] due to TIA(weakness and vision disturbance) symptomatology,  sent to [facility] for evaluation, BW, Ct scan was done, no sig changes noted. Did see Dr Rufus Farrell neurologist at [facility] [place] 2017 and he stated the same things that have been stated to her by all of her  provider's there is no carotid artery issue, there is sm vessel disease, continue medications, the feeling in her head may  never change. Hematological: Bruises/bleeds easily. Psychiatric/Behavioral: Positive for decreased concentration, dysphoric mood and sleep disturbance. The patient is nervous/anxious. Vitals:    09/24/19 1312   BP: 138/78   Site: Left Upper Arm   Position: Sitting   Pulse: 62   Temp: 98 °F (36.7 °C)   TempSrc: Temporal   SpO2: 96%   Weight: 175 lb (79.4 kg)   Height: 4' 10\" (1.473 m)     Estimated body mass index is 36.58 kg/m² as calculated from the following:    Height as of this encounter: 4' 10\" (1.473 m). Weight as of this encounter: 175 lb (79.4 kg). VS:  Blood pressure 138/78, pulse 62, temperature 98 °F (36.7 °C), temperature source Temporal, height 4' 10\" (1.473 m), weight 175 lb (79.4 kg), SpO2 96 %. Physical Exam   Constitutional: She is oriented to person, place, and time.  She appears

## 2019-09-28 LAB
6AM URINE: <10 NG/ML
CODEINE, URINE: <20 NG/ML
HYDROCODONE, URINE: 785 NG/ML
HYDROMORPHONE, URINE: 22 NG/ML
MORPHINE URINE: <20 NG/ML
NORHYDROCODONE, URINE: 2100 NG/ML
NOROXYCODONE, URINE: <20 NG/ML
NOROXYMORPHONE, URINE: <20 NG/ML
OXYCODONE, URINE CONFIRMATION: <20 NG/ML
OXYMORPHONE, URINE: <20 NG/ML

## 2019-09-30 ENCOUNTER — TELEPHONE (OUTPATIENT)
Dept: FAMILY MEDICINE CLINIC | Age: 84
End: 2019-09-30

## 2019-09-30 DIAGNOSIS — G89.4 CHRONIC PAIN SYNDROME: ICD-10-CM

## 2019-09-30 RX ORDER — HYDROCODONE BITARTRATE AND ACETAMINOPHEN 7.5; 325 MG/1; MG/1
1 TABLET ORAL 2 TIMES DAILY PRN
Qty: 45 TABLET | Refills: 0 | Status: CANCELLED | OUTPATIENT
Start: 2019-09-30 | End: 2019-10-30

## 2019-10-18 ENCOUNTER — OFFICE VISIT (OUTPATIENT)
Dept: FAMILY MEDICINE CLINIC | Age: 84
End: 2019-10-18
Payer: MEDICARE

## 2019-10-18 VITALS
TEMPERATURE: 97.4 F | SYSTOLIC BLOOD PRESSURE: 138 MMHG | DIASTOLIC BLOOD PRESSURE: 80 MMHG | HEART RATE: 60 BPM | OXYGEN SATURATION: 97 %

## 2019-10-18 DIAGNOSIS — G89.4 CHRONIC PAIN SYNDROME: Primary | ICD-10-CM

## 2019-10-18 DIAGNOSIS — Z86.73 HISTORY OF STROKE WITHOUT RESIDUAL DEFICITS: ICD-10-CM

## 2019-10-18 DIAGNOSIS — R26.2 DIFFICULTY WALKING: ICD-10-CM

## 2019-10-18 DIAGNOSIS — F41.8 ANXIETY WITH DEPRESSION: ICD-10-CM

## 2019-10-18 PROBLEM — G45.9 TRANSIENT ISCHEMIC ATTACK: Status: ACTIVE | Noted: 2019-10-18

## 2019-10-18 PROCEDURE — 4040F PNEUMOC VAC/ADMIN/RCVD: CPT | Performed by: NURSE PRACTITIONER

## 2019-10-18 PROCEDURE — G8417 CALC BMI ABV UP PARAM F/U: HCPCS | Performed by: NURSE PRACTITIONER

## 2019-10-18 PROCEDURE — G8482 FLU IMMUNIZE ORDER/ADMIN: HCPCS | Performed by: NURSE PRACTITIONER

## 2019-10-18 PROCEDURE — G8427 DOCREV CUR MEDS BY ELIG CLIN: HCPCS | Performed by: NURSE PRACTITIONER

## 2019-10-18 PROCEDURE — 1123F ACP DISCUSS/DSCN MKR DOCD: CPT | Performed by: NURSE PRACTITIONER

## 2019-10-18 PROCEDURE — G8598 ASA/ANTIPLAT THER USED: HCPCS | Performed by: NURSE PRACTITIONER

## 2019-10-18 PROCEDURE — 99214 OFFICE O/P EST MOD 30 MIN: CPT | Performed by: NURSE PRACTITIONER

## 2019-10-18 PROCEDURE — 1036F TOBACCO NON-USER: CPT | Performed by: NURSE PRACTITIONER

## 2019-10-18 PROCEDURE — 1090F PRES/ABSN URINE INCON ASSESS: CPT | Performed by: NURSE PRACTITIONER

## 2019-10-18 RX ORDER — HYDROCODONE BITARTRATE AND ACETAMINOPHEN 7.5; 325 MG/1; MG/1
1 TABLET ORAL 2 TIMES DAILY PRN
Qty: 60 TABLET | Refills: 0 | Status: SHIPPED | OUTPATIENT
Start: 2019-10-23 | End: 2019-11-22

## 2019-10-18 ASSESSMENT — ENCOUNTER SYMPTOMS
BACK PAIN: 1
RHINORRHEA: 1
NAUSEA: 0
SHORTNESS OF BREATH: 0
CHEST TIGHTNESS: 0
ALLERGIC/IMMUNOLOGIC NEGATIVE: 1
DIARRHEA: 0
ABDOMINAL PAIN: 0
EYE DISCHARGE: 1
COUGH: 0
CONSTIPATION: 0

## 2019-11-19 ENCOUNTER — OFFICE VISIT (OUTPATIENT)
Dept: FAMILY MEDICINE CLINIC | Age: 84
End: 2019-11-19
Payer: MEDICARE

## 2019-11-19 VITALS
SYSTOLIC BLOOD PRESSURE: 130 MMHG | BODY MASS INDEX: 38.25 KG/M2 | HEART RATE: 54 BPM | WEIGHT: 183 LBS | OXYGEN SATURATION: 94 % | DIASTOLIC BLOOD PRESSURE: 70 MMHG | TEMPERATURE: 97.9 F

## 2019-11-19 DIAGNOSIS — G89.4 CHRONIC PAIN SYNDROME: ICD-10-CM

## 2019-11-19 DIAGNOSIS — I10 ESSENTIAL HYPERTENSION: ICD-10-CM

## 2019-11-19 DIAGNOSIS — C18.9 MALIGNANT NEOPLASM OF COLON, UNSPECIFIED PART OF COLON (HCC): ICD-10-CM

## 2019-11-19 DIAGNOSIS — Z86.73 HISTORY OF STROKE WITHOUT RESIDUAL DEFICITS: Primary | ICD-10-CM

## 2019-11-19 DIAGNOSIS — R26.2 DIFFICULTY WALKING: ICD-10-CM

## 2019-11-19 DIAGNOSIS — I25.10 ATHEROSCLEROSIS OF CORONARY ARTERY OF NATIVE HEART WITHOUT ANGINA PECTORIS, UNSPECIFIED VESSEL OR LESION TYPE: ICD-10-CM

## 2019-11-19 DIAGNOSIS — F41.8 ANXIETY WITH DEPRESSION: ICD-10-CM

## 2019-11-19 DIAGNOSIS — K21.9 GASTROESOPHAGEAL REFLUX DISEASE WITHOUT ESOPHAGITIS: ICD-10-CM

## 2019-11-19 PROCEDURE — G8482 FLU IMMUNIZE ORDER/ADMIN: HCPCS | Performed by: NURSE PRACTITIONER

## 2019-11-19 PROCEDURE — 4040F PNEUMOC VAC/ADMIN/RCVD: CPT | Performed by: NURSE PRACTITIONER

## 2019-11-19 PROCEDURE — 1123F ACP DISCUSS/DSCN MKR DOCD: CPT | Performed by: NURSE PRACTITIONER

## 2019-11-19 PROCEDURE — G8598 ASA/ANTIPLAT THER USED: HCPCS | Performed by: NURSE PRACTITIONER

## 2019-11-19 PROCEDURE — 99214 OFFICE O/P EST MOD 30 MIN: CPT | Performed by: NURSE PRACTITIONER

## 2019-11-19 PROCEDURE — 1090F PRES/ABSN URINE INCON ASSESS: CPT | Performed by: NURSE PRACTITIONER

## 2019-11-19 PROCEDURE — 1036F TOBACCO NON-USER: CPT | Performed by: NURSE PRACTITIONER

## 2019-11-19 PROCEDURE — G8417 CALC BMI ABV UP PARAM F/U: HCPCS | Performed by: NURSE PRACTITIONER

## 2019-11-19 PROCEDURE — G8427 DOCREV CUR MEDS BY ELIG CLIN: HCPCS | Performed by: NURSE PRACTITIONER

## 2019-11-19 RX ORDER — METOPROLOL SUCCINATE 25 MG/1
12.5 TABLET, EXTENDED RELEASE ORAL NIGHTLY
Qty: 90 TABLET | Refills: 3 | Status: SHIPPED
Start: 2019-11-19 | End: 2020-04-06 | Stop reason: SDUPTHER

## 2019-11-19 RX ORDER — ASPIRIN 325 MG
325 TABLET ORAL
COMMUNITY

## 2019-11-19 RX ORDER — PANTOPRAZOLE SODIUM 40 MG/1
40 TABLET, DELAYED RELEASE ORAL DAILY
Qty: 90 TABLET | Refills: 2 | Status: SHIPPED | OUTPATIENT
Start: 2019-11-19 | End: 2020-08-12

## 2019-11-19 RX ORDER — CLOPIDOGREL BISULFATE 75 MG/1
75 TABLET ORAL DAILY
Qty: 90 TABLET | Refills: 2 | Status: SHIPPED | OUTPATIENT
Start: 2019-11-19 | End: 2020-11-16

## 2019-11-19 ASSESSMENT — ENCOUNTER SYMPTOMS
ALLERGIC/IMMUNOLOGIC NEGATIVE: 1
DIARRHEA: 0
ABDOMINAL PAIN: 0
BACK PAIN: 1
CHEST TIGHTNESS: 0
EYE DISCHARGE: 1
NAUSEA: 0
COUGH: 0
RHINORRHEA: 1
CONSTIPATION: 0
SHORTNESS OF BREATH: 0

## 2019-11-22 DIAGNOSIS — G89.4 CHRONIC PAIN SYNDROME: ICD-10-CM

## 2019-11-22 RX ORDER — HYDROCODONE BITARTRATE AND ACETAMINOPHEN 7.5; 325 MG/1; MG/1
1 TABLET ORAL 2 TIMES DAILY PRN
Qty: 60 TABLET | Refills: 0 | OUTPATIENT
Start: 2019-11-22 | End: 2019-12-22

## 2019-11-22 RX ORDER — HYDROCODONE BITARTRATE AND ACETAMINOPHEN 7.5; 325 MG/1; MG/1
1 TABLET ORAL 2 TIMES DAILY
Qty: 60 TABLET | Refills: 0 | Status: SHIPPED | OUTPATIENT
Start: 2019-11-22 | End: 2019-12-20 | Stop reason: SDUPTHER

## 2019-12-02 ENCOUNTER — TELEPHONE (OUTPATIENT)
Dept: FAMILY MEDICINE CLINIC | Age: 84
End: 2019-12-02

## 2019-12-16 ENCOUNTER — PROCEDURE VISIT (OUTPATIENT)
Dept: PODIATRY | Age: 84
End: 2019-12-16
Payer: MEDICARE

## 2019-12-16 VITALS
TEMPERATURE: 98.8 F | HEIGHT: 59 IN | SYSTOLIC BLOOD PRESSURE: 130 MMHG | WEIGHT: 183 LBS | BODY MASS INDEX: 36.89 KG/M2 | DIASTOLIC BLOOD PRESSURE: 70 MMHG

## 2019-12-16 DIAGNOSIS — I73.9 PERIPHERAL VASCULAR DISEASE, UNSPECIFIED (HCC): ICD-10-CM

## 2019-12-16 DIAGNOSIS — B35.1 TINEA UNGUIUM: Primary | ICD-10-CM

## 2019-12-16 DIAGNOSIS — M79.675 PAIN IN LEFT TOE(S): ICD-10-CM

## 2019-12-16 DIAGNOSIS — R26.2 DIFFICULTY WALKING: ICD-10-CM

## 2019-12-16 DIAGNOSIS — M79.674 PAIN IN TOE OF RIGHT FOOT: ICD-10-CM

## 2019-12-16 PROCEDURE — 11721 DEBRIDE NAIL 6 OR MORE: CPT | Performed by: PODIATRIST

## 2019-12-16 ASSESSMENT — ENCOUNTER SYMPTOMS
RESPIRATORY NEGATIVE: 1
GASTROINTESTINAL NEGATIVE: 1
EYES NEGATIVE: 1

## 2019-12-20 ENCOUNTER — OFFICE VISIT (OUTPATIENT)
Dept: FAMILY MEDICINE CLINIC | Age: 84
End: 2019-12-20
Payer: MEDICARE

## 2019-12-20 VITALS
SYSTOLIC BLOOD PRESSURE: 120 MMHG | HEIGHT: 59 IN | HEART RATE: 64 BPM | TEMPERATURE: 97 F | WEIGHT: 183 LBS | DIASTOLIC BLOOD PRESSURE: 62 MMHG | OXYGEN SATURATION: 95 % | BODY MASS INDEX: 36.89 KG/M2

## 2019-12-20 VITALS
BODY MASS INDEX: 36.89 KG/M2 | WEIGHT: 183 LBS | DIASTOLIC BLOOD PRESSURE: 62 MMHG | HEIGHT: 59 IN | HEART RATE: 64 BPM | TEMPERATURE: 97 F | OXYGEN SATURATION: 97 % | SYSTOLIC BLOOD PRESSURE: 120 MMHG

## 2019-12-20 DIAGNOSIS — G89.4 CHRONIC PAIN SYNDROME: ICD-10-CM

## 2019-12-20 DIAGNOSIS — I10 ESSENTIAL HYPERTENSION: ICD-10-CM

## 2019-12-20 DIAGNOSIS — Z86.73 HISTORY OF STROKE WITHOUT RESIDUAL DEFICITS: ICD-10-CM

## 2019-12-20 DIAGNOSIS — R60.9 PERIPHERAL EDEMA: ICD-10-CM

## 2019-12-20 DIAGNOSIS — C18.9 MALIGNANT NEOPLASM OF COLON, UNSPECIFIED PART OF COLON (HCC): ICD-10-CM

## 2019-12-20 DIAGNOSIS — F41.8 ANXIETY WITH DEPRESSION: Primary | ICD-10-CM

## 2019-12-20 DIAGNOSIS — E78.2 MIXED HYPERLIPIDEMIA: ICD-10-CM

## 2019-12-20 DIAGNOSIS — Z00.00 ROUTINE GENERAL MEDICAL EXAMINATION AT A HEALTH CARE FACILITY: Primary | ICD-10-CM

## 2019-12-20 DIAGNOSIS — N18.30 CHRONIC RENAL INSUFFICIENCY, STAGE III (MODERATE) (HCC): ICD-10-CM

## 2019-12-20 PROCEDURE — 1036F TOBACCO NON-USER: CPT | Performed by: NURSE PRACTITIONER

## 2019-12-20 PROCEDURE — 4040F PNEUMOC VAC/ADMIN/RCVD: CPT | Performed by: PHYSICIAN ASSISTANT

## 2019-12-20 PROCEDURE — 4040F PNEUMOC VAC/ADMIN/RCVD: CPT | Performed by: NURSE PRACTITIONER

## 2019-12-20 PROCEDURE — G8598 ASA/ANTIPLAT THER USED: HCPCS | Performed by: NURSE PRACTITIONER

## 2019-12-20 PROCEDURE — 99214 OFFICE O/P EST MOD 30 MIN: CPT | Performed by: NURSE PRACTITIONER

## 2019-12-20 PROCEDURE — 1123F ACP DISCUSS/DSCN MKR DOCD: CPT | Performed by: PHYSICIAN ASSISTANT

## 2019-12-20 PROCEDURE — G8598 ASA/ANTIPLAT THER USED: HCPCS | Performed by: PHYSICIAN ASSISTANT

## 2019-12-20 PROCEDURE — G8482 FLU IMMUNIZE ORDER/ADMIN: HCPCS | Performed by: NURSE PRACTITIONER

## 2019-12-20 PROCEDURE — G8482 FLU IMMUNIZE ORDER/ADMIN: HCPCS | Performed by: PHYSICIAN ASSISTANT

## 2019-12-20 PROCEDURE — 1090F PRES/ABSN URINE INCON ASSESS: CPT | Performed by: NURSE PRACTITIONER

## 2019-12-20 PROCEDURE — 1123F ACP DISCUSS/DSCN MKR DOCD: CPT | Performed by: NURSE PRACTITIONER

## 2019-12-20 PROCEDURE — G8427 DOCREV CUR MEDS BY ELIG CLIN: HCPCS | Performed by: NURSE PRACTITIONER

## 2019-12-20 PROCEDURE — G8417 CALC BMI ABV UP PARAM F/U: HCPCS | Performed by: NURSE PRACTITIONER

## 2019-12-20 PROCEDURE — G0439 PPPS, SUBSEQ VISIT: HCPCS | Performed by: PHYSICIAN ASSISTANT

## 2019-12-20 RX ORDER — PRAVASTATIN SODIUM 40 MG
60 TABLET ORAL DAILY
Qty: 90 TABLET | Refills: 2 | Status: SHIPPED | OUTPATIENT
Start: 2019-12-20 | End: 2019-12-26 | Stop reason: CLARIF

## 2019-12-20 RX ORDER — HYDROCODONE BITARTRATE AND ACETAMINOPHEN 7.5; 325 MG/1; MG/1
1 TABLET ORAL 2 TIMES DAILY
Qty: 60 TABLET | Refills: 0 | Status: SHIPPED | OUTPATIENT
Start: 2020-01-19 | End: 2020-02-10 | Stop reason: SDUPTHER

## 2019-12-20 RX ORDER — TORSEMIDE 10 MG/1
TABLET ORAL
Qty: 30 TABLET | Refills: 5 | Status: SHIPPED | OUTPATIENT
Start: 2019-12-20 | End: 2020-12-21

## 2019-12-20 RX ORDER — HYDROCODONE BITARTRATE AND ACETAMINOPHEN 7.5; 325 MG/1; MG/1
1 TABLET ORAL 2 TIMES DAILY
Qty: 60 TABLET | Refills: 0 | Status: SHIPPED | OUTPATIENT
Start: 2019-12-21 | End: 2020-01-20

## 2019-12-20 ASSESSMENT — ENCOUNTER SYMPTOMS
ALLERGIC/IMMUNOLOGIC NEGATIVE: 1
BACK PAIN: 1
CONSTIPATION: 0
SHORTNESS OF BREATH: 0
ABDOMINAL PAIN: 0
EYE DISCHARGE: 1
COUGH: 0
RHINORRHEA: 1
DIARRHEA: 0
NAUSEA: 0
CHEST TIGHTNESS: 0

## 2019-12-20 ASSESSMENT — PATIENT HEALTH QUESTIONNAIRE - PHQ9
SUM OF ALL RESPONSES TO PHQ QUESTIONS 1-9: 0
SUM OF ALL RESPONSES TO PHQ QUESTIONS 1-9: 0

## 2019-12-20 ASSESSMENT — LIFESTYLE VARIABLES: HOW OFTEN DO YOU HAVE A DRINK CONTAINING ALCOHOL: 0

## 2019-12-26 ENCOUNTER — TELEPHONE (OUTPATIENT)
Dept: FAMILY MEDICINE CLINIC | Age: 84
End: 2019-12-26

## 2019-12-26 RX ORDER — ROSUVASTATIN CALCIUM 20 MG/1
20 TABLET, COATED ORAL DAILY
COMMUNITY
End: 2019-12-26

## 2019-12-26 RX ORDER — ROSUVASTATIN CALCIUM 20 MG/1
20 TABLET, COATED ORAL DAILY
COMMUNITY
End: 2019-12-26 | Stop reason: SDUPTHER

## 2019-12-27 RX ORDER — ROSUVASTATIN CALCIUM 20 MG/1
20 TABLET, COATED ORAL DAILY
Qty: 90 TABLET | Refills: 3 | Status: SHIPPED | OUTPATIENT
Start: 2019-12-27 | End: 2020-12-21 | Stop reason: SDUPTHER

## 2020-02-10 ENCOUNTER — HOSPITAL ENCOUNTER (OUTPATIENT)
Age: 85
Discharge: HOME | End: 2020-02-12
Payer: MEDICARE

## 2020-02-10 ENCOUNTER — OFFICE VISIT (OUTPATIENT)
Dept: FAMILY MEDICINE CLINIC | Age: 85
End: 2020-02-10
Payer: MEDICARE

## 2020-02-10 VITALS
WEIGHT: 183 LBS | TEMPERATURE: 97.3 F | HEART RATE: 65 BPM | HEIGHT: 59 IN | OXYGEN SATURATION: 98 % | BODY MASS INDEX: 36.89 KG/M2

## 2020-02-10 LAB
BILIRUBIN, POC: NEGATIVE
BLOOD URINE, POC: ABNORMAL
CLARITY, POC: ABNORMAL
COLOR, POC: ABNORMAL
GLUCOSE URINE, POC: NEGATIVE
KETONES, POC: NEGATIVE
LEUKOCYTE EST, POC: ABNORMAL
NITRITE, POC: NEGATIVE
PH, POC: 5
PROTEIN, POC: ABNORMAL
SPECIFIC GRAVITY, POC: >=1.03
UROBILINOGEN, POC: ABNORMAL

## 2020-02-10 PROCEDURE — 1123F ACP DISCUSS/DSCN MKR DOCD: CPT | Performed by: FAMILY MEDICINE

## 2020-02-10 PROCEDURE — G8482 FLU IMMUNIZE ORDER/ADMIN: HCPCS | Performed by: FAMILY MEDICINE

## 2020-02-10 PROCEDURE — 1036F TOBACCO NON-USER: CPT | Performed by: FAMILY MEDICINE

## 2020-02-10 PROCEDURE — 1090F PRES/ABSN URINE INCON ASSESS: CPT | Performed by: FAMILY MEDICINE

## 2020-02-10 PROCEDURE — 4040F PNEUMOC VAC/ADMIN/RCVD: CPT | Performed by: FAMILY MEDICINE

## 2020-02-10 PROCEDURE — 87088 URINE BACTERIA CULTURE: CPT

## 2020-02-10 PROCEDURE — 87186 SC STD MICRODIL/AGAR DIL: CPT

## 2020-02-10 PROCEDURE — 99213 OFFICE O/P EST LOW 20 MIN: CPT | Performed by: FAMILY MEDICINE

## 2020-02-10 PROCEDURE — G8427 DOCREV CUR MEDS BY ELIG CLIN: HCPCS | Performed by: FAMILY MEDICINE

## 2020-02-10 PROCEDURE — G8417 CALC BMI ABV UP PARAM F/U: HCPCS | Performed by: FAMILY MEDICINE

## 2020-02-10 PROCEDURE — 81002 URINALYSIS NONAUTO W/O SCOPE: CPT | Performed by: FAMILY MEDICINE

## 2020-02-10 RX ORDER — HYDROCODONE BITARTRATE AND ACETAMINOPHEN 7.5; 325 MG/1; MG/1
1 TABLET ORAL 2 TIMES DAILY
Qty: 60 TABLET | Refills: 0 | Status: SHIPPED | OUTPATIENT
Start: 2020-02-10 | End: 2020-03-11

## 2020-02-10 RX ORDER — CEPHALEXIN 500 MG/1
500 CAPSULE ORAL 2 TIMES DAILY
Qty: 14 CAPSULE | Refills: 0 | Status: SHIPPED | OUTPATIENT
Start: 2020-02-10 | End: 2020-02-17

## 2020-02-11 ENCOUNTER — TELEPHONE (OUTPATIENT)
Dept: FAMILY MEDICINE CLINIC | Age: 85
End: 2020-02-11

## 2020-02-11 NOTE — TELEPHONE ENCOUNTER
Pt states she \"just doesn't know\" if she's allergic to it, the only true allergies she knows of is bactrim and sulfa.  States she's \"never even heard of those other medicines\"

## 2020-02-11 NOTE — TELEPHONE ENCOUNTER
[facility] left message; we sent in cephalexin. She is allergic to cephalosporins. Ok to dispense or change atb ?

## 2020-02-11 NOTE — TELEPHONE ENCOUNTER
We don't show an allergy. She has dementia and would probably say yes to whatever they ask her. Does have some other allergies.  I don't really have an alternative

## 2020-02-12 LAB
ORGANISM: ABNORMAL
URINE CULTURE, ROUTINE: ABNORMAL

## 2020-02-12 NOTE — TELEPHONE ENCOUNTER
Dr Neftaly Cheng, since this is your pt, can you please confirm or deny if this pt has an allergy to cephalosporins? Unless you want her on a different ATB? Please advise.

## 2020-03-15 RX ORDER — HYDROCODONE BITARTRATE AND ACETAMINOPHEN 7.5; 325 MG/1; MG/1
1 TABLET ORAL 2 TIMES DAILY
Qty: 60 TABLET | Refills: 0 | Status: SHIPPED
Start: 2020-03-19 | End: 2020-03-16 | Stop reason: SDUPTHER

## 2020-03-16 ENCOUNTER — TELEPHONE (OUTPATIENT)
Dept: FAMILY MEDICINE CLINIC | Age: 85
End: 2020-03-16

## 2020-03-16 RX ORDER — HYDROCODONE BITARTRATE AND ACETAMINOPHEN 7.5; 325 MG/1; MG/1
1 TABLET ORAL 2 TIMES DAILY
Qty: 60 TABLET | Refills: 0 | Status: SHIPPED
Start: 2020-03-19 | End: 2020-04-06 | Stop reason: SDUPTHER

## 2020-03-25 ENCOUNTER — TELEPHONE (OUTPATIENT)
Dept: FAMILY MEDICINE CLINIC | Age: 85
End: 2020-03-25

## 2020-03-25 NOTE — TELEPHONE ENCOUNTER
Called pt to inform she needs appt by 3/31/20, due to controlled substances per [facility], spoke with pt scheduled for 3/31/20, at 1 pm.

## 2020-03-27 ENCOUNTER — TELEPHONE (OUTPATIENT)
Dept: FAMILY MEDICINE CLINIC | Age: 85
End: 2020-03-27

## 2020-04-02 ENCOUNTER — TELEPHONE (OUTPATIENT)
Dept: PRIMARY CARE CLINIC | Age: 85
End: 2020-04-02

## 2020-04-06 ENCOUNTER — VIRTUAL VISIT (OUTPATIENT)
Dept: PRIMARY CARE CLINIC | Age: 85
End: 2020-04-06
Payer: MEDICARE

## 2020-04-06 VITALS — HEIGHT: 60 IN | WEIGHT: 180 LBS | BODY MASS INDEX: 35.34 KG/M2

## 2020-04-06 PROCEDURE — 99214 OFFICE O/P EST MOD 30 MIN: CPT | Performed by: NURSE PRACTITIONER

## 2020-04-06 RX ORDER — HYDROCODONE BITARTRATE AND ACETAMINOPHEN 7.5; 325 MG/1; MG/1
1 TABLET ORAL 2 TIMES DAILY
Qty: 60 TABLET | Refills: 0 | Status: SHIPPED | OUTPATIENT
Start: 2020-05-17 | End: 2020-06-16

## 2020-04-06 RX ORDER — HYDROCODONE BITARTRATE AND ACETAMINOPHEN 7.5; 325 MG/1; MG/1
1 TABLET ORAL 2 TIMES DAILY
Qty: 60 TABLET | Refills: 0 | Status: SHIPPED | OUTPATIENT
Start: 2020-04-18 | End: 2020-05-18

## 2020-04-06 RX ORDER — METOPROLOL SUCCINATE 25 MG/1
12.5 TABLET, EXTENDED RELEASE ORAL NIGHTLY
Qty: 90 TABLET | Refills: 3 | Status: SHIPPED | OUTPATIENT
Start: 2020-04-06 | End: 2021-03-23 | Stop reason: SDUPTHER

## 2020-04-06 RX ORDER — KETOCONAZOLE 20 MG/ML
SHAMPOO TOPICAL
Qty: 120 ML | Refills: 2 | Status: SHIPPED
Start: 2020-04-06 | End: 2020-06-30

## 2020-04-06 ASSESSMENT — ENCOUNTER SYMPTOMS
NAUSEA: 0
RHINORRHEA: 1
EYE DISCHARGE: 1
BACK PAIN: 1
ABDOMINAL PAIN: 0
CHEST TIGHTNESS: 0
CONSTIPATION: 0
DIARRHEA: 0
COUGH: 0
SHORTNESS OF BREATH: 0
ALLERGIC/IMMUNOLOGIC NEGATIVE: 1

## 2020-04-06 NOTE — PROGRESS NOTES
at 9pm and at [id number] [phone] [id number] \"walked\" over to son's house mumbling because couldn't get phone  dialed. Family noted rt side facial droop. Stated she has left leg numbness and tingling as well. Family found BP elevated  and . Brought to ED and while there , sx resolved. Had just had the EEG done by Dr Renita Lopez earlier that day and had seen her on 1-17-17  Had echo and carotid doppler and CT all unchanged. on 2-3-2017 back in hospital, was seen by trt in [facility] due to TIA(weakness and vision disturbance) symptomatology,  sent to [facility] for evaluation, BW, Ct scan was done, no sig changes noted. Did see Dr Jonatan Forrest neurologist at [facility] [place] 2017 and he stated the same things that have been stated to her by all of her  provider's there is no carotid artery issue, there is sm vessel disease, continue medications, the feeling in her head may  never change. Hematological: Bruises/bleeds easily. Psychiatric/Behavioral: Positive for decreased concentration, dysphoric mood and sleep disturbance. The patient is nervous/anxious. Vitals:    04/06/20 1141   Weight: 180 lb (81.6 kg)   Height: 5' (1.524 m)     Estimated body mass index is 35.15 kg/m² as calculated from the following:    Height as of this encounter: 5' (1.524 m). Weight as of this encounter: 180 lb (81.6 kg). VS:  Height 5' (1.524 m), weight 180 lb (81.6 kg). Physical Exam  Constitutional:       General: She is not in acute distress. Comments: Younger than stated age   HENT:      Head: Normocephalic. Right Ear: External ear normal.      Left Ear: External ear normal.      Nose: Nose normal.   Eyes:      Conjunctiva/sclera: Conjunctivae normal.   Neurological:      Mental Status: She is alert and oriented to person, place, and time. Mental status is at baseline. Psychiatric:         Mood and Affect: Mood normal.         Behavior: Behavior normal.                .  AssessmASSESSMENT/PLAN:     Diagnosis Orders   1. Anxiety with depression     2.

## 2020-06-22 ENCOUNTER — PROCEDURE VISIT (OUTPATIENT)
Dept: PODIATRY | Age: 85
End: 2020-06-22
Payer: MEDICARE

## 2020-06-22 VITALS
HEIGHT: 59 IN | SYSTOLIC BLOOD PRESSURE: 123 MMHG | TEMPERATURE: 97.6 F | BODY MASS INDEX: 36.36 KG/M2 | DIASTOLIC BLOOD PRESSURE: 74 MMHG

## 2020-06-22 PROCEDURE — G8427 DOCREV CUR MEDS BY ELIG CLIN: HCPCS | Performed by: PODIATRIST

## 2020-06-22 PROCEDURE — 1123F ACP DISCUSS/DSCN MKR DOCD: CPT | Performed by: PODIATRIST

## 2020-06-22 PROCEDURE — G8417 CALC BMI ABV UP PARAM F/U: HCPCS | Performed by: PODIATRIST

## 2020-06-22 PROCEDURE — 1036F TOBACCO NON-USER: CPT | Performed by: PODIATRIST

## 2020-06-22 PROCEDURE — 99213 OFFICE O/P EST LOW 20 MIN: CPT | Performed by: PODIATRIST

## 2020-06-22 PROCEDURE — 4040F PNEUMOC VAC/ADMIN/RCVD: CPT | Performed by: PODIATRIST

## 2020-06-22 PROCEDURE — 11721 DEBRIDE NAIL 6 OR MORE: CPT | Performed by: PODIATRIST

## 2020-06-22 PROCEDURE — 1090F PRES/ABSN URINE INCON ASSESS: CPT | Performed by: PODIATRIST

## 2020-06-22 RX ORDER — GABAPENTIN 100 MG/1
100 CAPSULE ORAL 2 TIMES DAILY
Qty: 60 CAPSULE | Refills: 5 | Status: SHIPPED | OUTPATIENT
Start: 2020-06-22 | End: 2020-09-24 | Stop reason: ALTCHOICE

## 2020-06-22 ASSESSMENT — ENCOUNTER SYMPTOMS
RESPIRATORY NEGATIVE: 1
GASTROINTESTINAL NEGATIVE: 1
EYES NEGATIVE: 1

## 2020-06-23 ENCOUNTER — OFFICE VISIT (OUTPATIENT)
Dept: PRIMARY CARE CLINIC | Age: 85
End: 2020-06-23
Payer: MEDICARE

## 2020-06-23 VITALS
TEMPERATURE: 97.1 F | DIASTOLIC BLOOD PRESSURE: 78 MMHG | SYSTOLIC BLOOD PRESSURE: 138 MMHG | HEART RATE: 64 BPM | HEIGHT: 59 IN | BODY MASS INDEX: 36.89 KG/M2 | WEIGHT: 183 LBS | OXYGEN SATURATION: 96 %

## 2020-06-23 PROBLEM — G45.9 TIA (TRANSIENT ISCHEMIC ATTACK): Status: RESOLVED | Noted: 2019-10-18 | Resolved: 2020-06-23

## 2020-06-23 PROBLEM — C18.9 COLON CANCER (HCC): Status: RESOLVED | Noted: 2019-11-19 | Resolved: 2020-06-23

## 2020-06-23 PROCEDURE — 1090F PRES/ABSN URINE INCON ASSESS: CPT | Performed by: FAMILY MEDICINE

## 2020-06-23 PROCEDURE — 99214 OFFICE O/P EST MOD 30 MIN: CPT | Performed by: FAMILY MEDICINE

## 2020-06-23 PROCEDURE — G8427 DOCREV CUR MEDS BY ELIG CLIN: HCPCS | Performed by: FAMILY MEDICINE

## 2020-06-23 PROCEDURE — 4040F PNEUMOC VAC/ADMIN/RCVD: CPT | Performed by: FAMILY MEDICINE

## 2020-06-23 PROCEDURE — 1036F TOBACCO NON-USER: CPT | Performed by: FAMILY MEDICINE

## 2020-06-23 PROCEDURE — G8417 CALC BMI ABV UP PARAM F/U: HCPCS | Performed by: FAMILY MEDICINE

## 2020-06-23 PROCEDURE — 1123F ACP DISCUSS/DSCN MKR DOCD: CPT | Performed by: FAMILY MEDICINE

## 2020-06-23 RX ORDER — HYDROCODONE BITARTRATE AND ACETAMINOPHEN 7.5; 325 MG/1; MG/1
1 TABLET ORAL 2 TIMES DAILY
Qty: 60 TABLET | Refills: 0 | Status: SHIPPED
Start: 2020-07-23 | End: 2020-08-10 | Stop reason: SDUPTHER

## 2020-06-23 RX ORDER — HYDROCODONE BITARTRATE AND ACETAMINOPHEN 7.5; 325 MG/1; MG/1
1 TABLET ORAL 2 TIMES DAILY
COMMUNITY
End: 2020-06-23 | Stop reason: SDUPTHER

## 2020-06-23 RX ORDER — HYDROCODONE BITARTRATE AND ACETAMINOPHEN 7.5; 325 MG/1; MG/1
1 TABLET ORAL 2 TIMES DAILY
Qty: 60 TABLET | Refills: 0 | Status: SHIPPED
Start: 2020-08-23 | End: 2020-08-24

## 2020-06-23 RX ORDER — HYDROCODONE BITARTRATE AND ACETAMINOPHEN 7.5; 325 MG/1; MG/1
1 TABLET ORAL 2 TIMES DAILY
Qty: 60 TABLET | Refills: 0 | Status: SHIPPED
Start: 2020-06-23 | End: 2020-07-23

## 2020-06-23 ASSESSMENT — ENCOUNTER SYMPTOMS
NAUSEA: 0
COUGH: 0
WHEEZING: 0
CONSTIPATION: 0
BACK PAIN: 1
ABDOMINAL PAIN: 0
DIARRHEA: 0
VOMITING: 0
SHORTNESS OF BREATH: 0

## 2020-06-23 NOTE — PROGRESS NOTES
She is not ill-appearing. HENT:      Head: Normocephalic and atraumatic. Right Ear: Hearing and external ear normal.      Left Ear: Hearing and external ear normal.      Nose:      Comments: Wearing mask  Eyes:      General: Lids are normal. No scleral icterus. Extraocular Movements: Extraocular movements intact. Conjunctiva/sclera: Conjunctivae normal.   Neck:      Musculoskeletal: Normal range of motion and neck supple. Thyroid: No thyromegaly. Cardiovascular:      Rate and Rhythm: Normal rate and regular rhythm. Heart sounds: Normal heart sounds. No murmur. Pulmonary:      Effort: Pulmonary effort is normal. No respiratory distress. Breath sounds: Normal breath sounds. No wheezing. Musculoskeletal:         General: No tenderness or deformity. Right lower leg: No edema. Left lower leg: No edema. Lymphadenopathy:      Cervical: No cervical adenopathy. Skin:     General: Skin is warm and dry. Findings: No rash. Neurological:      General: No focal deficit present. Mental Status: She is alert and oriented to person, place, and time. Gait: Gait abnormal (patient seated in wheelchair). Psychiatric:         Mood and Affect: Mood and affect normal.         Speech: Speech normal.         Behavior: Behavior normal.         Thought Content:  Thought content normal.         Labs:  CBC with Differential:    Lab Results   Component Value Date    WBC 8.1 09/24/2019    RBC 4.32 09/24/2019    HGB 12.5 09/24/2019    HCT 41.3 09/24/2019    PLT 76 09/24/2019    MCV 95.6 09/24/2019    MCH 28.9 09/24/2019    MCHC 30.3 09/24/2019    RDW 13.7 09/24/2019    LYMPHOPCT 12.9 09/24/2019    MONOPCT 5.3 09/24/2019    BASOPCT 0.5 09/24/2019    MONOSABS 0.43 09/24/2019    LYMPHSABS 1.05 09/24/2019    EOSABS 0.05 09/24/2019    BASOSABS 0.04 09/24/2019     CMP:    Lab Results   Component Value Date     09/24/2019    K 4.7 09/24/2019     09/24/2019    CO2 23 09/24/2019 BUN 16 09/24/2019    CREATININE 1.2 09/24/2019    CREATININE 1.3 12/14/2018    GFRAA 51 09/24/2019    LABGLOM 42 09/24/2019    GLUCOSE 111 09/24/2019    PROT 7.3 09/24/2019    LABALBU 4.1 09/24/2019    CALCIUM 9.6 09/24/2019    BILITOT 0.4 09/24/2019    ALKPHOS 70 09/24/2019    AST 18 09/24/2019    ALT 8 09/24/2019     Uric Acid:  No results found for: Makenzie Stewart  Microalbumen/Creatinine ratio:  No components found for: RUCREAT  FLP:    Lab Results   Component Value Date    TRIG 141 09/24/2019    HDL 47 09/24/2019    LDLCALC 74 09/24/2019    LABVLDL 28 09/24/2019     TSH:  No results found for: TSH       Assessment and Plan:  Gail Draper was seen today for established new doctor. Diagnoses and all orders for this visit:    Chronic pain syndrome  -     HYDROcodone-acetaminophen (NORCO) 7.5-325 MG per tablet; Take 1 tablet by mouth 2 times daily for 30 days.  -     HYDROcodone-acetaminophen (NORCO) 7.5-325 MG per tablet; Take 1 tablet by mouth 2 times daily for 30 days. Intended supply: 30 days  -     HYDROcodone-acetaminophen (NORCO) 7.5-325 MG per tablet; Take 1 tablet by mouth 2 times daily for 30 days. Intended supply: 30 days  OARRS reviewed and is appropriate. At high risk for falls  On the basis of positive falls risk screening, assessment and plan is as follows: medications adjusted- Explained to patient that it is OK to try Gabapentin. Return in about 3 months (around 9/23/2020) for Chronic pain medication refills.       Seen By:  Gerri Dodson DO

## 2020-06-30 RX ORDER — KETOCONAZOLE 20 MG/ML
SHAMPOO TOPICAL
Qty: 120 ML | Refills: 0 | Status: SHIPPED
Start: 2020-06-30 | End: 2021-06-29

## 2020-08-10 ENCOUNTER — TELEPHONE (OUTPATIENT)
Dept: PRIMARY CARE CLINIC | Age: 85
End: 2020-08-10

## 2020-08-10 RX ORDER — HYDROCODONE BITARTRATE AND ACETAMINOPHEN 7.5; 325 MG/1; MG/1
1 TABLET ORAL 2 TIMES DAILY
Qty: 60 TABLET | Refills: 0 | Status: SHIPPED
Start: 2020-08-10 | End: 2020-09-09

## 2020-08-10 NOTE — TELEPHONE ENCOUNTER
I can do that this time, but she has to follow the refill schedule or I won't continue prescribing narcotics.

## 2020-08-10 NOTE — TELEPHONE ENCOUNTER
Pt did not fill her script for her percocet that was dated for 7/23. She is now out of pills and her next script is dated for 8/23. She wants to know if a refill can be sent to [facility] in [address] [place] [address].

## 2020-08-12 RX ORDER — PANTOPRAZOLE SODIUM 40 MG/1
TABLET, DELAYED RELEASE ORAL
Qty: 90 TABLET | Refills: 1 | Status: SHIPPED
Start: 2020-08-12 | End: 2021-03-23 | Stop reason: SDUPTHER

## 2020-08-21 ENCOUNTER — TELEPHONE (OUTPATIENT)
Dept: ADMINISTRATIVE | Age: 85
End: 2020-08-21

## 2020-08-21 NOTE — TELEPHONE ENCOUNTER
Pt is requesting an appointment. Pt states that 1 week after she saw Dr. Belinda Martinez she started to feel pain in her back and underneath her breast area. Pt describes it as a burning type of pain which has been ongoing for apprx 3 weeks now. Pt also stated she has had shingles before. Please contact pt.     Thank you

## 2020-08-24 ENCOUNTER — OFFICE VISIT (OUTPATIENT)
Dept: PRIMARY CARE CLINIC | Age: 85
End: 2020-08-24
Payer: MEDICARE

## 2020-08-24 VITALS
TEMPERATURE: 97.7 F | RESPIRATION RATE: 16 BRPM | SYSTOLIC BLOOD PRESSURE: 118 MMHG | BODY MASS INDEX: 35.95 KG/M2 | HEART RATE: 62 BPM | DIASTOLIC BLOOD PRESSURE: 66 MMHG | OXYGEN SATURATION: 96 % | WEIGHT: 178 LBS

## 2020-08-24 PROCEDURE — 1036F TOBACCO NON-USER: CPT | Performed by: FAMILY MEDICINE

## 2020-08-24 PROCEDURE — G8427 DOCREV CUR MEDS BY ELIG CLIN: HCPCS | Performed by: FAMILY MEDICINE

## 2020-08-24 PROCEDURE — 99213 OFFICE O/P EST LOW 20 MIN: CPT | Performed by: FAMILY MEDICINE

## 2020-08-24 PROCEDURE — 4040F PNEUMOC VAC/ADMIN/RCVD: CPT | Performed by: FAMILY MEDICINE

## 2020-08-24 PROCEDURE — G8417 CALC BMI ABV UP PARAM F/U: HCPCS | Performed by: FAMILY MEDICINE

## 2020-08-24 PROCEDURE — 1090F PRES/ABSN URINE INCON ASSESS: CPT | Performed by: FAMILY MEDICINE

## 2020-08-24 PROCEDURE — 1123F ACP DISCUSS/DSCN MKR DOCD: CPT | Performed by: FAMILY MEDICINE

## 2020-08-24 RX ORDER — NYSTATIN 100000 [USP'U]/G
POWDER TOPICAL
Qty: 60 G | Refills: 1 | Status: SHIPPED
Start: 2020-08-24 | End: 2020-10-19

## 2020-08-24 ASSESSMENT — ENCOUNTER SYMPTOMS
VOMITING: 0
ABDOMINAL PAIN: 0
CONSTIPATION: 0
SHORTNESS OF BREATH: 0
COUGH: 0
WHEEZING: 0
DIARRHEA: 0
NAUSEA: 0
BACK PAIN: 1

## 2020-08-24 NOTE — PROGRESS NOTES
118/66   Pulse: 62   Resp: 16   Temp: 97.7 °F (36.5 °C)   TempSrc: Temporal   SpO2: 96%   Weight: 178 lb (80.7 kg)       Physical Exam:  Physical Exam  Vitals signs and nursing note reviewed. Constitutional:       General: She is not in acute distress. Appearance: Normal appearance. She is well-developed. She is obese. She is not ill-appearing. HENT:      Head: Normocephalic and atraumatic. Right Ear: Hearing and external ear normal.      Left Ear: Hearing and external ear normal.      Nose:      Comments: Wearing mask  Eyes:      General: Lids are normal. No scleral icterus. Extraocular Movements: Extraocular movements intact. Conjunctiva/sclera: Conjunctivae normal.   Neck:      Musculoskeletal: Normal range of motion and neck supple. Thyroid: No thyromegaly. Cardiovascular:      Rate and Rhythm: Normal rate and regular rhythm. Heart sounds: Normal heart sounds. No murmur. Pulmonary:      Effort: Pulmonary effort is normal. No respiratory distress. Breath sounds: Normal breath sounds. No wheezing. Musculoskeletal:         General: No tenderness or deformity. Right lower leg: No edema. Left lower leg: No edema. Lymphadenopathy:      Cervical: No cervical adenopathy. Skin:     General: Skin is warm and dry. Findings: Rash (erythema under right breast) present. Neurological:      General: No focal deficit present. Mental Status: She is alert and oriented to person, place, and time. Gait: Gait abnormal (patient seated in wheelchair). Psychiatric:         Mood and Affect: Mood and affect normal.         Speech: Speech normal.         Behavior: Behavior normal.         Thought Content: Thought content normal.          Assessment and Plan:  Theo Mendoza was seen today for herpes zoster. Diagnoses and all orders for this visit:    Intertrigo  -     nystatin (MYCOSTATIN) 825221 UNIT/GM powder; Apply 3 times daily.     Disability of walking  - Handicap Placard MISC; by Does not apply route Duration:  Lifetime  Exp: 08/2025    Rash does not have the appearance of shingles. More due to heat and moisture under breast.  Will treat with Nystatin powder at this time. Return if symptoms worsen or fail to improve.       Seen By:  Fabby Ty DO

## 2020-09-21 ENCOUNTER — PROCEDURE VISIT (OUTPATIENT)
Dept: PODIATRY | Age: 85
End: 2020-09-21
Payer: MEDICARE

## 2020-09-21 VITALS — TEMPERATURE: 97.3 F | SYSTOLIC BLOOD PRESSURE: 136 MMHG | DIASTOLIC BLOOD PRESSURE: 82 MMHG

## 2020-09-21 PROCEDURE — 11721 DEBRIDE NAIL 6 OR MORE: CPT | Performed by: PODIATRIST

## 2020-09-21 ASSESSMENT — ENCOUNTER SYMPTOMS
RESPIRATORY NEGATIVE: 1
EYES NEGATIVE: 1
GASTROINTESTINAL NEGATIVE: 1

## 2020-09-21 NOTE — PROGRESS NOTES
breakfast)      clopidogrel (PLAVIX) 75 MG tablet Take 1 tablet by mouth daily 90 tablet 2    donepezil (ARICEPT) 5 MG tablet Take 1 tablet by mouth daily (with breakfast) 90 tablet 5    sertraline (ZOLOFT) 25 MG tablet Take 1 tablet by mouth daily 1/2 daily 90 tablet 1    dextran 70-hypromellose (TEARS PURE) 0.1-0.3 % SOLN opthalmic solution       cycloSPORINE (RESTASIS OP) Apply 1 drop to eye 2 times daily      vitamin D (CHOLECALCIFEROL) 1000 UNIT TABS tablet Take by mouth      Multiple Vitamins-Minerals (CENTRUM SILVER 50+MEN PO) Take by mouth      Cyanocobalamin (B-12) 2500 MCG TABS Take 1 tablet by mouth daily       No current facility-administered medications on file prior to visit. Review of Systems   Constitutional: Negative. HENT: Negative. Eyes: Negative. Respiratory: Negative. Cardiovascular: Negative. Gastrointestinal: Negative. Endocrine: Negative. Genitourinary: Negative. Musculoskeletal: Negative. Objective:  General: AAO x 3 in NAD.     Derm  Toenail Description  Sites of Onychomycosis Involvement (Check affected area)  [x] [x] [x] [x] [x] [x] [x] [x] [x] [x]  5 4 3 2 1 1 2 3 4 5                          Right                                        Left    Thickness  [x] [x] [x] [x] [x] [x] [x] [x] [x] [x]  5 4 3 2 1 1 2 3 4 5                         Right                                        Left    Dystrophic Changes   [x] [x] [x] [x] [x] [x] [x] [x] [x] [x]  5 4 3 2 1 1 2 3 4 5                         Right                                        Left    Color  [x] [x] [x] [x] [x] [x] [x] [x] [x] [x]  5 4 3 2 1 1 2 3 4 5                          Right                                        Left    Incurvation/Ingrowin   [x] [x] [x] [x] [x] [x] [x] [x] [x] [x]  5 4 3 2 1 1 2 3 4 5                         Right                                        Left    Inflammation/Pain   [x] [x] [x] [x] [x] [x] [x] [x] [x] [x]  5 4 3  2 1 1 2 3 4 5 Right                                        Left      Nails that are described above are all elongated thickened pitting mycotic yellowish incurvated causing pain with both shoe gear. Palpation1-5  Bilateral     Dermatologic Exam:hair loss noted  lower extremity    Skin lesion/ulceration   Skin   Callus neg  Musculoskeletal:     1st MPJ ROM normal, Bilateral.  Muscle strength 5/5, Bilateral.  Pain present upon palpation of toenails 1-5  Bilateral., Bilateral.  Ankle ROM normal,Bilateral.    Dorsally contracted digits, Bilateral.     Vascular:  Pulses   bilateral DP absent    PT absent    Neurological:  Sensation present to light touch to level of digits, Bilateral.    Foot Exam    General  General Appearance: appears stated age and healthy   Orientation: alert and oriented to person, place, and time   Assistance: walker use and wheelchair use       Right Foot/Ankle     Inspection and Palpation  Tenderness: great toe metatarsophalangeal joint and metatarsals   Hallux valgus: yes  Hallux limitus: yes  Skin Exam: abnormal color; Neurovascular  Dorsalis pedis: absent  Posterior tibial: absent  Saphenous nerve sensation: diminished  Tibial nerve sensation: diminished  Superficial peroneal nerve sensation: diminished  Deep peroneal nerve sensation: diminished  Sural nerve sensation: diminished      Left Foot/Ankle      Inspection and Palpation  Tenderness: metatarsals   Hallux valgus: yes  Skin Exam: abnormal color; Neurovascular  Dorsalis pedis: absent  Posterior tibial: absent  Saphenous nerve sensation: diminished  Tibial nerve sensation: diminished  Superficial peroneal nerve sensation: diminished  Deep peroneal nerve sensation: diminished  Sural nerve sensation: diminished             Ortho Exam  Q7   []Yes    []No                Q8   [x]Yes    []No                     Q9   []Yes    []No  Assessment:  80 y.o. female with:   1. Tinea unguium    2.  Peripheral vascular disease, unspecified ([address]    3. Pain in toe of right foot    4. Pain in left toe(s)    5. Difficulty walking     No orders of the defined types were placed in this encounter. Plan: we did discuss gabapentin for neuropathy  Will check with Dr Sandor Kim    Pt was evaluated and examined. Patient was given personalized discharge instructions. Nails 1-10 were debrided in length and thickness sharply with a nail nipper and  without incident. Pt will follow up in 9 weeks or sooner if any problems arise. Diagnosis was discussed with the pt and all of their questions were answered in detail. Proper foot hygiene and care was discussed with the pt. Patient to check feet daily and contact the office with any questions/problems/concerns. Other comorbidity noted and will be managed by PCP. Pain waiver discussed with patient and confirmed.    9/21/2020      Electronically signed by Betty Espinoza DPM on 9/21/2020 at 2:05 PM  9/21/2020

## 2020-09-24 ENCOUNTER — OFFICE VISIT (OUTPATIENT)
Dept: PRIMARY CARE CLINIC | Age: 85
End: 2020-09-24
Payer: MEDICARE

## 2020-09-24 VITALS
OXYGEN SATURATION: 94 % | HEART RATE: 68 BPM | TEMPERATURE: 97.1 F | DIASTOLIC BLOOD PRESSURE: 72 MMHG | SYSTOLIC BLOOD PRESSURE: 134 MMHG

## 2020-09-24 PROCEDURE — G8427 DOCREV CUR MEDS BY ELIG CLIN: HCPCS | Performed by: FAMILY MEDICINE

## 2020-09-24 PROCEDURE — 1123F ACP DISCUSS/DSCN MKR DOCD: CPT | Performed by: FAMILY MEDICINE

## 2020-09-24 PROCEDURE — 4040F PNEUMOC VAC/ADMIN/RCVD: CPT | Performed by: FAMILY MEDICINE

## 2020-09-24 PROCEDURE — 1090F PRES/ABSN URINE INCON ASSESS: CPT | Performed by: FAMILY MEDICINE

## 2020-09-24 PROCEDURE — G8417 CALC BMI ABV UP PARAM F/U: HCPCS | Performed by: FAMILY MEDICINE

## 2020-09-24 PROCEDURE — 99214 OFFICE O/P EST MOD 30 MIN: CPT | Performed by: FAMILY MEDICINE

## 2020-09-24 PROCEDURE — 1036F TOBACCO NON-USER: CPT | Performed by: FAMILY MEDICINE

## 2020-09-24 ASSESSMENT — ENCOUNTER SYMPTOMS
DIARRHEA: 0
ABDOMINAL PAIN: 0
NAUSEA: 0
CONSTIPATION: 0
SHORTNESS OF BREATH: 0
WHEEZING: 0
VOMITING: 0
BACK PAIN: 1
COUGH: 0

## 2020-09-24 NOTE — PROGRESS NOTES
every morning (before breakfast)      clopidogrel (PLAVIX) 75 MG tablet Take 1 tablet by mouth daily 90 tablet 2    donepezil (ARICEPT) 5 MG tablet Take 1 tablet by mouth daily (with breakfast) 90 tablet 5    sertraline (ZOLOFT) 25 MG tablet Take 1 tablet by mouth daily 1/2 daily 90 tablet 1    dextran 70-hypromellose (TEARS PURE) 0.1-0.3 % SOLN opthalmic solution       cycloSPORINE (RESTASIS OP) Apply 1 drop to eye 2 times daily      vitamin D (CHOLECALCIFEROL) 1000 UNIT TABS tablet Take by mouth      Multiple Vitamins-Minerals (CENTRUM SILVER 50+MEN PO) Take by mouth      Cyanocobalamin (B-12) 2500 MCG TABS Take 1 tablet by mouth daily       No current facility-administered medications on file prior to visit. Allergies   Allergen Reactions    Bactrim [Sulfamethoxazole-Trimethoprim]     Lyrica [Pregabalin]     Prednisone     Sulfa Antibiotics Rash       Past Medical History:   Diagnosis Date    Anxiety with depression     Arrhythmia     PAF    CAD (coronary artery disease)     Chronic kidney disease     stage 3    Chronic pain syndrome     Colon cancer (HCC)     History of CVA (cerebrovascular accident) without residual deficits     Hyperlipidemia     Hypertension     Osteoarthritis     Shingles     Sleep apnea     TIA (transient ischemic attack)      Past Surgical History:   Procedure Laterality Date    BARTHOLIN GLAND CYST EXCISION      CARPAL TUNNEL RELEASE      CATARACT REMOVAL      COLECTOMY      with colostomy    DIAGNOSTIC CARDIAC CATH LAB PROCEDURE      drug eluting stent proximal RCA and diagonal branch    HYSTERECTOMY      LUMBAR LAMINECTOMY       Family History   Problem Relation Age of Onset    Heart Disease Mother         age Pérez  1560    Colon Cancer Father         age 80    No Known Problems Brother     Cancer Brother         liver     Social History     Socioeconomic History    Marital status:       Spouse name: Not on file    Number of children: Not on file    Years of education: Not on file    Highest education level: Not on file   Occupational History    Occupation: retired various jobs/sales   Social Needs    Financial resource strain: Not on file    Food insecurity     Worry: Not on file     Inability: Not on file   [employer] needs     Medical: Not on file     Non-medical: Not on file   Tobacco Use    Smoking status: Never Smoker    Smokeless tobacco: Never Used   Substance and Sexual Activity    Alcohol use: Never     Frequency: Never    Drug use: Never    Sexual activity: Not Currently     Partners: Male     Birth control/protection: Post-menopausal   Lifestyle    Physical activity     Days per week: Not on file     Minutes per session: Not on file    Stress: Not on file   Relationships    Social connections     Talks on phone: Not on file     Gets together: Not on file     Attends Yarsanism service: Not on file     Active member of club or organization: Not on file     Attends meetings of clubs or organizations: Not on file     Relationship status: Not on file    Intimate partner violence     Fear of current or ex partner: Not on file     Emotionally abused: Not on file     Physically abused: Not on file     Forced sexual activity: Not on file   Other Topics Concern    Not on file   Social History Narrative    Not on file       Vitals:    09/24/20 1421   BP: 134/72   Pulse: 68   Temp: 97.1 °F (36.2 °C)   SpO2: 94%       Physical Exam:  Physical Exam  Vitals signs and nursing note reviewed. Constitutional:       General: She is not in acute distress. Appearance: Normal appearance. She is well-developed. She is obese. She is not ill-appearing. HENT:      Head: Normocephalic and atraumatic. Right Ear: Hearing and external ear normal.      Left Ear: Hearing and external ear normal.      Nose:      Comments: Wearing mask  Eyes:      General: Lids are normal. No scleral icterus.      Extraocular Movements: Extraocular movements intact. Conjunctiva/sclera: Conjunctivae normal.   Neck:      Musculoskeletal: Normal range of motion and neck supple. Thyroid: No thyromegaly. Cardiovascular:      Rate and Rhythm: Normal rate and regular rhythm. Heart sounds: Normal heart sounds. No murmur. Pulmonary:      Effort: Pulmonary effort is normal. No respiratory distress. Breath sounds: Normal breath sounds. No wheezing. Musculoskeletal:         General: No tenderness or deformity. Right lower leg: No edema. Left lower leg: No edema. Lymphadenopathy:      Cervical: No cervical adenopathy. Skin:     General: Skin is warm and dry. Findings: No rash. Neurological:      General: No focal deficit present. Mental Status: She is alert and oriented to person, place, and time. Gait: Gait abnormal (patient seated in wheelchair). Psychiatric:         Mood and Affect: Mood and affect normal.         Speech: Speech normal.         Behavior: Behavior normal.         Thought Content:  Thought content normal.         Labs:  CBC with Differential:    Lab Results   Component Value Date    WBC 8.1 09/24/2019    RBC 4.32 09/24/2019    HGB 12.5 09/24/2019    HCT 41.3 09/24/2019    PLT 76 09/24/2019    MCV 95.6 09/24/2019    MCH 28.9 09/24/2019    MCHC 30.3 09/24/2019    RDW 13.7 09/24/2019    LYMPHOPCT 12.9 09/24/2019    MONOPCT 5.3 09/24/2019    BASOPCT 0.5 09/24/2019    MONOSABS 0.43 09/24/2019    LYMPHSABS 1.05 09/24/2019    EOSABS 0.05 09/24/2019    BASOSABS 0.04 09/24/2019     CMP:    Lab Results   Component Value Date     09/24/2019    K 4.7 09/24/2019     09/24/2019    CO2 23 09/24/2019    BUN 16 09/24/2019    CREATININE 1.2 09/24/2019    CREATININE 1.3 12/14/2018    GFRAA 51 09/24/2019    LABGLOM 42 09/24/2019    GLUCOSE 111 09/24/2019    PROT 7.3 09/24/2019    LABALBU 4.1 09/24/2019    CALCIUM 9.6 09/24/2019    BILITOT 0.4 09/24/2019    ALKPHOS 70 09/24/2019    AST 18 09/24/2019    ALT 8 09/24/2019     Uric Acid:  No results found for: Romeo Hylton  Microalbumen/Creatinine ratio:  No components found for: RUCREAT  FLP:    Lab Results   Component Value Date    TRIG 141 09/24/2019    HDL 47 09/24/2019    LDLCALC 74 09/24/2019    LABVLDL 28 09/24/2019     TSH:  No results found for: TSH       Assessment and Plan:  Andrew Rodriguez was seen today for chronic pain and medication refill. Diagnoses and all orders for this visit:    Primary osteoarthritis involving multiple joints  -     HYDROcodone-acetaminophen (NORCO) 7.5-325 MG per tablet; Take 1 tablet by mouth 2 times daily for 30 days. Intended supply: 30 days  -     HYDROcodone-acetaminophen (NORCO) 7.5-325 MG per tablet; Take 1 tablet by mouth 2 times daily for 30 days. Intended supply: 30 days  -     HYDROcodone-acetaminophen (NORCO) 7.5-325 MG per tablet; Take 1 tablet by mouth 2 times daily for 30 days. Intended supply: 30 days    Early onset Alzheimer's disease with behavioral disturbance ([facility] [address]    Morbidly obese ([facility] [address]      OARRS reviewed and is appropriate. Medications refilled for 3 months. Due for labs at this time. Return in about 3 months (around 12/24/2020) for Chronic pain medication refills.       Seen By:  Obi Santos DO

## 2020-09-25 ENCOUNTER — TELEPHONE (OUTPATIENT)
Dept: PRIMARY CARE CLINIC | Age: 85
End: 2020-09-25

## 2020-09-25 DIAGNOSIS — G89.4 CHRONIC PAIN SYNDROME: ICD-10-CM

## 2020-09-25 PROBLEM — F02.818 EARLY ONSET ALZHEIMER'S DISEASE WITH BEHAVIORAL DISTURBANCE (HCC): Status: ACTIVE | Noted: 2020-09-25

## 2020-09-25 PROBLEM — G30.0 EARLY ONSET ALZHEIMER'S DISEASE WITH BEHAVIORAL DISTURBANCE (HCC): Status: ACTIVE | Noted: 2020-09-25

## 2020-09-25 PROBLEM — E66.01 MORBIDLY OBESE (HCC): Status: ACTIVE | Noted: 2020-09-25

## 2020-09-25 RX ORDER — HYDROCODONE BITARTRATE AND ACETAMINOPHEN 7.5; 325 MG/1; MG/1
1 TABLET ORAL 2 TIMES DAILY
Qty: 60 TABLET | Refills: 0 | Status: SHIPPED
Start: 2020-09-25 | End: 2020-10-25

## 2020-09-25 RX ORDER — HYDROCODONE BITARTRATE AND ACETAMINOPHEN 7.5; 325 MG/1; MG/1
1 TABLET ORAL 2 TIMES DAILY
Qty: 60 TABLET | Refills: 0 | Status: SHIPPED
Start: 2020-10-23 | End: 2020-11-22

## 2020-09-25 RX ORDER — HYDROCODONE BITARTRATE AND ACETAMINOPHEN 7.5; 325 MG/1; MG/1
1 TABLET ORAL 2 TIMES DAILY
Qty: 60 TABLET | Refills: 0 | Status: SHIPPED
Start: 2020-11-20 | End: 2020-12-20

## 2020-09-25 RX ORDER — HYDROCODONE BITARTRATE AND ACETAMINOPHEN 7.5; 325 MG/1; MG/1
1 TABLET ORAL 2 TIMES DAILY
Qty: 60 TABLET | Refills: 0 | OUTPATIENT
Start: 2020-09-25 | End: 2020-10-25

## 2020-09-25 NOTE — TELEPHONE ENCOUNTER
Message in preservice inbox:  Daughter called back, refill for vicodin still not called in yet at [facility] --please call it in  Message around 10:15am  Any questions call [phone]

## 2020-10-02 ENCOUNTER — TELEPHONE (OUTPATIENT)
Dept: PRIMARY CARE CLINIC | Age: 85
End: 2020-10-02

## 2020-10-06 RX ORDER — FLUTICASONE PROPIONATE 50 MCG
1 SPRAY, SUSPENSION (ML) NASAL DAILY
Qty: 1 BOTTLE | Refills: 5 | Status: SHIPPED
Start: 2020-10-06 | End: 2020-12-21

## 2020-10-19 RX ORDER — NYSTATIN 100000 [USP'U]/G
POWDER TOPICAL
Qty: 60 G | Refills: 0 | Status: SHIPPED
Start: 2020-10-19 | End: 2021-06-29

## 2020-10-30 ENCOUNTER — HOSPITAL ENCOUNTER (OUTPATIENT)
Age: 85
Discharge: HOME | End: 2020-11-01
Payer: MEDICARE

## 2020-10-30 LAB
ALBUMIN SERPL-MCNC: 3.9 G/DL (ref 3.5–5.2)
ALP BLD-CCNC: 64 U/L (ref 35–104)
ALT SERPL-CCNC: 9 U/L (ref 0–32)
ANION GAP SERPL CALCULATED.3IONS-SCNC: 13 MMOL/L (ref 7–16)
AST SERPL-CCNC: 16 U/L (ref 0–31)
BACTERIA: ABNORMAL /HPF
BASOPHILS ABSOLUTE: 0.04 E9/L (ref 0–0.2)
BASOPHILS RELATIVE PERCENT: 0.6 % (ref 0–2)
BILIRUB SERPL-MCNC: 0.4 MG/DL (ref 0–1.2)
BILIRUBIN URINE: ABNORMAL
BLOOD, URINE: NEGATIVE
BUN BLDV-MCNC: 20 MG/DL (ref 8–23)
CALCIUM SERPL-MCNC: 9.5 MG/DL (ref 8.6–10.2)
CHLORIDE BLD-SCNC: 108 MMOL/L (ref 98–107)
CHOLESTEROL, TOTAL: 144 MG/DL (ref 0–199)
CLARITY: ABNORMAL
CO2: 24 MMOL/L (ref 22–29)
COLOR: YELLOW
CREAT SERPL-MCNC: 1.2 MG/DL (ref 0.5–1)
EOSINOPHILS ABSOLUTE: 0.09 E9/L (ref 0.05–0.5)
EOSINOPHILS RELATIVE PERCENT: 1.3 % (ref 0–6)
GFR AFRICAN AMERICAN: 51
GFR NON-AFRICAN AMERICAN: 42 ML/MIN/1.73
GLUCOSE BLD-MCNC: 103 MG/DL (ref 74–99)
GLUCOSE URINE: NEGATIVE MG/DL
HBA1C MFR BLD: 5.3 % (ref 4–5.6)
HCT VFR BLD CALC: 40.9 % (ref 34–48)
HDLC SERPL-MCNC: 50 MG/DL
HEMOGLOBIN: 13 G/DL (ref 11.5–15.5)
IMMATURE GRANULOCYTES #: 0.07 E9/L
IMMATURE GRANULOCYTES %: 1 % (ref 0–5)
KETONES, URINE: ABNORMAL MG/DL
LDL CHOLESTEROL CALCULATED: 66 MG/DL (ref 0–99)
LEUKOCYTE ESTERASE, URINE: ABNORMAL
LYMPHOCYTES ABSOLUTE: 1.11 E9/L (ref 1.5–4)
LYMPHOCYTES RELATIVE PERCENT: 16.1 % (ref 20–42)
MCH RBC QN AUTO: 29.6 PG (ref 26–35)
MCHC RBC AUTO-ENTMCNC: 31.8 % (ref 32–34.5)
MCV RBC AUTO: 93.2 FL (ref 80–99.9)
MONOCYTES ABSOLUTE: 0.48 E9/L (ref 0.1–0.95)
MONOCYTES RELATIVE PERCENT: 7 % (ref 2–12)
NEUTROPHILS ABSOLUTE: 5.11 E9/L (ref 1.8–7.3)
NEUTROPHILS RELATIVE PERCENT: 74 % (ref 43–80)
NITRITE, URINE: NEGATIVE
OVALOCYTES: ABNORMAL
PDW BLD-RTO: 13.5 FL (ref 11.5–15)
PH UA: 5.5 (ref 5–9)
PLATELET # BLD: 64 E9/L (ref 130–450)
PLATELET CONFIRMATION: NORMAL
PMV BLD AUTO: 12.1 FL (ref 7–12)
POLYCHROMASIA: ABNORMAL
POTASSIUM SERPL-SCNC: 4.4 MMOL/L (ref 3.5–5)
PROTEIN UA: 30 MG/DL
RBC # BLD: 4.39 E12/L (ref 3.5–5.5)
RBC UA: >20 /HPF (ref 0–2)
SODIUM BLD-SCNC: 145 MMOL/L (ref 132–146)
SPECIFIC GRAVITY UA: >=1.03 (ref 1–1.03)
TOTAL PROTEIN: 7 G/DL (ref 6.4–8.3)
TRIGL SERPL-MCNC: 141 MG/DL (ref 0–149)
TSH SERPL DL<=0.05 MIU/L-ACNC: 2.87 UIU/ML (ref 0.27–4.2)
UROBILINOGEN, URINE: 1 E.U./DL
VITAMIN D 25-HYDROXY: 47 NG/ML (ref 30–100)
VLDLC SERPL CALC-MCNC: 28 MG/DL
WBC # BLD: 6.9 E9/L (ref 4.5–11.5)
WBC UA: ABNORMAL /HPF (ref 0–5)

## 2020-10-30 PROCEDURE — 36415 COLL VENOUS BLD VENIPUNCTURE: CPT

## 2020-10-30 PROCEDURE — 82306 VITAMIN D 25 HYDROXY: CPT

## 2020-10-30 PROCEDURE — 81001 URINALYSIS AUTO W/SCOPE: CPT

## 2020-10-30 PROCEDURE — 84443 ASSAY THYROID STIM HORMONE: CPT

## 2020-10-30 PROCEDURE — 80053 COMPREHEN METABOLIC PANEL: CPT

## 2020-10-30 PROCEDURE — 85025 COMPLETE CBC W/AUTO DIFF WBC: CPT

## 2020-10-30 PROCEDURE — 80061 LIPID PANEL: CPT

## 2020-10-30 PROCEDURE — 83036 HEMOGLOBIN GLYCOSYLATED A1C: CPT

## 2020-11-02 ENCOUNTER — NURSE ONLY (OUTPATIENT)
Dept: PRIMARY CARE CLINIC | Age: 85
End: 2020-11-02
Payer: MEDICARE

## 2020-11-02 PROCEDURE — 90694 VACC AIIV4 NO PRSRV 0.5ML IM: CPT | Performed by: FAMILY MEDICINE

## 2020-11-02 PROCEDURE — G0008 ADMIN INFLUENZA VIRUS VAC: HCPCS | Performed by: FAMILY MEDICINE

## 2020-12-03 RX ORDER — GABAPENTIN 100 MG/1
100 CAPSULE ORAL 3 TIMES DAILY
Qty: 270 CAPSULE | Refills: 1 | Status: SHIPPED
Start: 2020-12-03 | End: 2021-02-22 | Stop reason: SDUPTHER

## 2020-12-07 ENCOUNTER — PROCEDURE VISIT (OUTPATIENT)
Dept: PODIATRY | Age: 85
End: 2020-12-07
Payer: MEDICARE

## 2020-12-07 VITALS — DIASTOLIC BLOOD PRESSURE: 74 MMHG | SYSTOLIC BLOOD PRESSURE: 126 MMHG | TEMPERATURE: 98.2 F

## 2020-12-07 PROCEDURE — 11721 DEBRIDE NAIL 6 OR MORE: CPT | Performed by: PODIATRIST

## 2020-12-07 ASSESSMENT — ENCOUNTER SYMPTOMS
BACK PAIN: 1
GASTROINTESTINAL NEGATIVE: 1
EYES NEGATIVE: 1
RESPIRATORY NEGATIVE: 1

## 2020-12-07 NOTE — PROGRESS NOTES
Lana Lockett  Return Patient    Chief Complaint   Patient presents with    Toe Pain     saw pcp Dr. Jonatan Mora on 9/24/2020 Dr. Vasquez Everett increased her Gabapentin to TID at [facility]       Subjective: This Jamie Arya comes to clinic for foot and nail care. Pt currently has complaint of thickened, painful, elongated nails that he/she cannot manage by themselves. Pt. Relates pain to nails with shoe gear. Pt's primary care physician is Jonatan Mora DO. Pt having  Pain numbness  Burning  both feet   No trauma    Past Medical History:   Diagnosis Date    Anxiety with depression     Arrhythmia     PAF    CAD (coronary artery disease)     Chronic kidney disease     stage 3    Chronic pain syndrome     Colon cancer ([facility] [address]     History of CVA (cerebrovascular accident) without residual deficits     Hyperlipidemia     Hypertension     Osteoarthritis     Shingles     Sleep apnea     TIA (transient ischemic attack)        Allergies   Allergen Reactions    Bactrim [Sulfamethoxazole-Trimethoprim]     Lyrica [Pregabalin]     Prednisone     Sulfa Antibiotics Rash     Current Outpatient Medications on File Prior to Visit   Medication Sig Dispense Refill    gabapentin (NEURONTIN) 100 MG capsule Take 1 capsule by mouth 3 times daily for 180 days. 270 capsule 1    donepezil (ARICEPT) 5 MG tablet TAKE ONE TABLET BY MOUTH DAILY WITH BREAKFAST. 90 tablet 1    clopidogrel (PLAVIX) 75 MG tablet TAKE ONE TABLET BY MOUTH DAILY 90 tablet 1    nystatin (MYCOSTATIN) 255978 UNIT/GM powder APPLY TO THE AFFECTED AREA  3 TIMES DAILY 60 g 0    fluticasone (FLONASE) 50 MCG/ACT nasal spray 1 spray by Each Nostril route daily 1 Bottle 5    HYDROcodone-acetaminophen (NORCO) 7.5-325 MG per tablet Take 1 tablet by mouth 2 times daily for 30 days.  Intended supply: 30 days 60 tablet 0    pantoprazole (PROTONIX) 40 MG tablet TAKE ONE TABLET BY MOUTH DAILY 90 tablet 1    ketoconazole (NIZORAL) 2 % shampoo APPLY TOPICALLY DAILY AS NEEDED. 120 mL 0    metoprolol succinate (TOPROL XL) 25 MG extended release tablet Take 0.5 tablets by mouth nightly TAKE 0.5 MG DAILY 90 tablet 3    rosuvastatin (CRESTOR) 20 MG tablet Take 1 tablet by mouth daily 90 tablet 3    torsemide (DEMADEX) 10 MG tablet 2 to 3 times weekly 30 tablet 5    aspirin 325 MG tablet Take 325 mg by mouth every morning (before breakfast)      sertraline (ZOLOFT) 25 MG tablet Take 1 tablet by mouth daily 1/2 daily 90 tablet 1    dextran 70-hypromellose (TEARS PURE) 0.1-0.3 % SOLN opthalmic solution       cycloSPORINE (RESTASIS OP) Apply 1 drop to eye 2 times daily      vitamin D (CHOLECALCIFEROL) 1000 UNIT TABS tablet Take by mouth      Multiple Vitamins-Minerals (CENTRUM SILVER 50+MEN PO) Take by mouth      Cyanocobalamin (B-12) 2500 MCG TABS Take 1 tablet by mouth daily       No current facility-administered medications on file prior to visit. Review of Systems   Constitutional: Negative. HENT: Negative. Eyes: Negative. Respiratory: Negative. Cardiovascular: Negative. Gastrointestinal: Negative. Endocrine: Negative. Genitourinary: Negative. Musculoskeletal: Positive for arthralgias, back pain, gait problem and joint swelling. Objective:  General: AAO x 3 in NAD.     Derm  Toenail Description  Sites of Onychomycosis Involvement (Check affected area)  [x] [x] [x] [x] [x] [x] [x] [x] [x] [x]  5 4 3 2 1 1 2 3 4 5                          Right                                        Left    Thickness  [x] [x] [x] [x] [x] [x] [x] [x] [x] [x]  5 4 3 2 1 1 2 3 4 5                         Right                                        Left    Dystrophic Changes   [x] [x] [x] [x] [x] [x] [x] [x] [x] [x]  5 4 3 2 1 1 2 3 4 5                         Right                                        Left    Color  [x] [x] [x] [x] [x] [x] [x] [x] [x] [x]  5 4 3 2 1 1 2 3 4 5                          Right Left    Incurvation/Ingrowin   [x] [x] [x] [x] [x] [x] [x] [x] [x] [x]  5 4 3 2 1 1 2 3 4 5                         Right                                        Left    Inflammation/Pain   [x] [x] [x] [x] [x] [x] [x] [x] [x] [x]  5 4 3  2 1 1 2 3 4 5                         Right                                        Left      Nails that are described above are all elongated thickened pitting mycotic yellowish incurvated causing pain with both shoe gear. Palpation1-5  Bilateral     Dermatologic Exam:hair loss noted  lower extremity    Skin lesion/ulceration   Skin   Callus neg  Musculoskeletal:     1st MPJ ROM normal, Bilateral.  Muscle strength 5/5, Bilateral.  Pain present upon palpation of toenails 1-5  Bilateral., Bilateral.  Ankle ROM normal,Bilateral.    Dorsally contracted digits, Bilateral.     Vascular:  Pulses   bilateral DP absent    PT absent    Neurological:  Sensation present to light touch to level of digits, Bilateral.    Foot Exam    General  General Appearance: appears stated age and healthy   Orientation: alert and oriented to person, place, and time   Assistance: walker use and wheelchair use       Right Foot/Ankle     Inspection and Palpation  Tenderness: great toe metatarsophalangeal joint and metatarsals   Hallux valgus: yes  Hallux limitus: yes  Skin Exam: abnormal color; Neurovascular  Dorsalis pedis: absent  Posterior tibial: absent  Saphenous nerve sensation: diminished  Tibial nerve sensation: diminished  Superficial peroneal nerve sensation: diminished  Deep peroneal nerve sensation: diminished  Sural nerve sensation: diminished      Left Foot/Ankle      Inspection and Palpation  Tenderness: metatarsals   Hallux valgus: yes  Skin Exam: abnormal color;      Neurovascular  Dorsalis pedis: absent  Posterior tibial: absent  Saphenous nerve sensation: diminished  Tibial nerve sensation: diminished  Superficial peroneal nerve sensation: diminished  Deep peroneal nerve sensation: diminished  Sural nerve sensation: diminished             Ortho Exam  Q7   []Yes    []No                Q8   [x]Yes    []No                     Q9   []Yes    []No  Assessment:  80 y.o. female with:   1. Tinea unguium    2. Peripheral vascular disease, unspecified ([facility] [address]    3. Pain in toe of right foot    4. Pain in left toe(s)    5. Difficulty walking     No orders of the defined types were placed in this encounter. Plan:    Pt was evaluated and examined. Patient was given personalized discharge instructions. Nails 1-10 were debrided in length and thickness sharply with a nail nipper and  without incident. Pt will follow up in 9 weeks or sooner if any problems arise. Diagnosis was discussed with the pt and all of their questions were answered in detail. Proper foot hygiene and care was discussed with the pt. Patient to check feet daily and contact the office with any questions/problems/concerns. Other comorbidity noted and will be managed by PCP. Pain waiver discussed with patient and confirmed.    12/7/2020      Electronically signed by Nela Iqbal DPM on 12/7/2020 at 2:07 PM  12/7/2020

## 2020-12-21 ENCOUNTER — OFFICE VISIT (OUTPATIENT)
Dept: PRIMARY CARE CLINIC | Age: 85
End: 2020-12-21
Payer: MEDICARE

## 2020-12-21 VITALS
HEIGHT: 59 IN | DIASTOLIC BLOOD PRESSURE: 60 MMHG | TEMPERATURE: 96 F | SYSTOLIC BLOOD PRESSURE: 124 MMHG | OXYGEN SATURATION: 94 % | WEIGHT: 180 LBS | BODY MASS INDEX: 36.29 KG/M2 | HEART RATE: 61 BPM

## 2020-12-21 DIAGNOSIS — E78.2 MIXED HYPERLIPIDEMIA: ICD-10-CM

## 2020-12-21 DIAGNOSIS — I25.10 CORONARY ARTERY DISEASE INVOLVING NATIVE CORONARY ARTERY OF NATIVE HEART WITHOUT ANGINA PECTORIS: ICD-10-CM

## 2020-12-21 DIAGNOSIS — N39.46 MIXED STRESS AND URGE URINARY INCONTINENCE: ICD-10-CM

## 2020-12-21 DIAGNOSIS — N18.32 CHRONIC RENAL IMPAIRMENT, STAGE 3B (HCC): ICD-10-CM

## 2020-12-21 DIAGNOSIS — R73.01 IMPAIRED FASTING GLUCOSE: ICD-10-CM

## 2020-12-21 DIAGNOSIS — I10 ESSENTIAL HYPERTENSION: ICD-10-CM

## 2020-12-21 DIAGNOSIS — E55.9 VITAMIN D DEFICIENCY: ICD-10-CM

## 2020-12-21 DIAGNOSIS — M15.9 PRIMARY OSTEOARTHRITIS INVOLVING MULTIPLE JOINTS: Primary | ICD-10-CM

## 2020-12-21 PROCEDURE — 4040F PNEUMOC VAC/ADMIN/RCVD: CPT | Performed by: FAMILY MEDICINE

## 2020-12-21 PROCEDURE — G8417 CALC BMI ABV UP PARAM F/U: HCPCS | Performed by: FAMILY MEDICINE

## 2020-12-21 PROCEDURE — 1090F PRES/ABSN URINE INCON ASSESS: CPT | Performed by: FAMILY MEDICINE

## 2020-12-21 PROCEDURE — 1036F TOBACCO NON-USER: CPT | Performed by: FAMILY MEDICINE

## 2020-12-21 PROCEDURE — G8484 FLU IMMUNIZE NO ADMIN: HCPCS | Performed by: FAMILY MEDICINE

## 2020-12-21 PROCEDURE — G8427 DOCREV CUR MEDS BY ELIG CLIN: HCPCS | Performed by: FAMILY MEDICINE

## 2020-12-21 PROCEDURE — 1123F ACP DISCUSS/DSCN MKR DOCD: CPT | Performed by: FAMILY MEDICINE

## 2020-12-21 PROCEDURE — 0509F URINE INCON PLAN DOCD: CPT | Performed by: FAMILY MEDICINE

## 2020-12-21 PROCEDURE — 99214 OFFICE O/P EST MOD 30 MIN: CPT | Performed by: FAMILY MEDICINE

## 2020-12-21 RX ORDER — MULTIVIT-MIN/IRON/FOLIC ACID/K 18-600-40
CAPSULE ORAL
COMMUNITY
End: 2021-06-29

## 2020-12-21 RX ORDER — HYDROCODONE BITARTRATE AND ACETAMINOPHEN 7.5; 325 MG/1; MG/1
1 TABLET ORAL EVERY 12 HOURS PRN
Qty: 60 TABLET | Refills: 0 | Status: SHIPPED
Start: 2020-12-21 | End: 2021-03-23 | Stop reason: SDUPTHER

## 2020-12-21 RX ORDER — ROSUVASTATIN CALCIUM 20 MG/1
20 TABLET, COATED ORAL DAILY
Qty: 90 TABLET | Refills: 3 | Status: SHIPPED
Start: 2020-12-21 | End: 2021-12-14

## 2020-12-21 RX ORDER — HYDROCODONE BITARTRATE AND ACETAMINOPHEN 7.5; 325 MG/1; MG/1
1 TABLET ORAL EVERY 12 HOURS PRN
Qty: 60 TABLET | Refills: 0 | Status: SHIPPED
Start: 2021-01-18 | End: 2021-03-23 | Stop reason: SDUPTHER

## 2020-12-21 RX ORDER — HYDROCODONE BITARTRATE AND ACETAMINOPHEN 7.5; 325 MG/1; MG/1
1 TABLET ORAL EVERY 12 HOURS PRN
Qty: 60 TABLET | Refills: 0 | Status: SHIPPED
Start: 2021-02-15 | End: 2021-03-23 | Stop reason: SDUPTHER

## 2020-12-21 RX ORDER — OXYBUTYNIN CHLORIDE 10 MG/1
10 TABLET, EXTENDED RELEASE ORAL DAILY
Qty: 90 TABLET | Refills: 1 | Status: SHIPPED
Start: 2020-12-21 | End: 2021-06-30

## 2020-12-21 ASSESSMENT — ENCOUNTER SYMPTOMS
CONSTIPATION: 0
VOMITING: 0
DIARRHEA: 0
NAUSEA: 0
SHORTNESS OF BREATH: 0
WHEEZING: 0
BACK PAIN: 1
COUGH: 0
ABDOMINAL PAIN: 0

## 2020-12-21 NOTE — PROGRESS NOTES
20  [place] : 1929 Sex: female  Age: 80 y.o. Chief Complaint   Patient presents with    Medication Refill     HPI:  80 y.o. female presents today for 3 month follow up of chronic medical conditions, medication refills and FBW results. Patient's chart, medical, surgical and medication history all reviewed. Chronic Pain  Patient has long standing history of chronic back pain with numbness and tingling in her feet. Has had long standing lumbar disc disease with past laminectomy. She has been on Norco for many years. Compliant with follow ups. Coronary Artery Disease   Patient presents for routine coronary artery disease follow-up. Cardiac risk factors include advanced age (older than 54 for men, 72 for women), dyslipidemia, hypertension, obesity (BMI >= 30 kg/m2) and sedentary lifestyle. Patient has a history of stent placement years ago. She is  on ASA/Statin/BB/ACEi. Incontinence  Patient also has a long standing history of incontinence. Previously received Botox injections that were very helpful. Since they've worn off, patient having recurrent issues. Does not want to have injections again. ROS:  Review of Systems   Constitutional: Negative for chills, fatigue and fever. Respiratory: Negative for cough, shortness of breath and wheezing. Cardiovascular: Negative for chest pain and palpitations. Gastrointestinal: Negative for abdominal pain, constipation, diarrhea, nausea and vomiting. Genitourinary: Positive for frequency (incontinence). Musculoskeletal: Positive for arthralgias, back pain and gait problem. Skin: Negative for rash. Neurological: Positive for weakness and numbness. Negative for dizziness and headaches. Psychiatric/Behavioral: Negative for dysphoric mood. The patient is not nervous/anxious. All other systems reviewed and are negative.        Current Outpatient Medications on File Prior to Visit   Medication Sig Dispense Refill    Cholecalciferol (VITAMIN D) 50 MCG (2000 UT) CAPS capsule Take by mouth      gabapentin (NEURONTIN) 100 MG capsule Take 1 capsule by mouth 3 times daily for 180 days. 270 capsule 1    donepezil (ARICEPT) 5 MG tablet TAKE ONE TABLET BY MOUTH DAILY WITH BREAKFAST. 90 tablet 1    clopidogrel (PLAVIX) 75 MG tablet TAKE ONE TABLET BY MOUTH DAILY 90 tablet 1    nystatin (MYCOSTATIN) 227424 UNIT/GM powder APPLY TO THE AFFECTED AREA  3 TIMES DAILY 60 g 0    pantoprazole (PROTONIX) 40 MG tablet TAKE ONE TABLET BY MOUTH DAILY 90 tablet 1    ketoconazole (NIZORAL) 2 % shampoo APPLY TOPICALLY DAILY AS NEEDED. 120 mL 0    metoprolol succinate (TOPROL XL) 25 MG extended release tablet Take 0.5 tablets by mouth nightly TAKE 0.5 MG DAILY 90 tablet 3    aspirin 325 MG tablet Take 325 mg by mouth every morning (before breakfast)      sertraline (ZOLOFT) 25 MG tablet Take 1 tablet by mouth daily 1/2 daily 90 tablet 1    dextran 70-hypromellose (TEARS PURE) 0.1-0.3 % SOLN opthalmic solution       Multiple Vitamins-Minerals (CENTRUM SILVER 50+MEN PO) Take by mouth      Cyanocobalamin (B-12) 2500 MCG TABS Take 1 tablet by mouth daily       No current facility-administered medications on file prior to visit.         Allergies   Allergen Reactions    Bactrim [Sulfamethoxazole-Trimethoprim]     Lyrica [Pregabalin]     Prednisone     Sulfa Antibiotics Rash       Past Medical History:   Diagnosis Date    Anxiety with depression     Arrhythmia     PAF    CAD (coronary artery disease)     Chronic kidney disease     stage 3    Chronic pain syndrome     Colon cancer (HCC)     History of CVA (cerebrovascular accident) without residual deficits     Hyperlipidemia     Hypertension     Osteoarthritis     Shingles     Sleep apnea     TIA (transient ischemic attack)      Past Surgical History:   Procedure Laterality Date    BARTHOLIN GLAND CYST EXCISION      CARPAL TUNNEL RELEASE      CATARACT REMOVAL      COLECTOMY      with colostomy    DIAGNOSTIC CARDIAC CATH LAB PROCEDURE      drug eluting stent proximal RCA and diagonal branch    HYSTERECTOMY      LUMBAR LAMINECTOMY       Family History   Problem Relation Age of Onset    Heart Disease Mother         age 80    Colon Cancer Father         age 80    No Known Problems Brother     Cancer Brother         liver     Social History     Socioeconomic History    Marital status:       Spouse name: Not on file    Number of children: Not on file    Years of education: Not on file    Highest education level: Not on file   Occupational History    Occupation: retired various jobs/sales   Social Needs    Financial resource strain: Not on file    Food insecurity     Worry: Not on file     Inability: Not on file   [employer] needs     Medical: Not on file     Non-medical: Not on file   Tobacco Use    Smoking status: Never Smoker    Smokeless tobacco: Never Used   Substance and Sexual Activity    Alcohol use: Never     Frequency: Never    Drug use: Never    Sexual activity: Not Currently     Partners: Male     Birth control/protection: Post-menopausal   Lifestyle    Physical activity     Days per week: Not on file     Minutes per session: Not on file    Stress: Not on file   Relationships    Social connections     Talks on phone: Not on file     Gets together: Not on file     Attends Christianity service: Not on file     Active member of club or organization: Not on file     Attends meetings of clubs or organizations: Not on file     Relationship status: Not on file    Intimate partner violence     Fear of current or ex partner: Not on file     Emotionally abused: Not on file     Physically abused: Not on file     Forced sexual activity: Not on file   Other Topics Concern    Not on file   Social History Narrative    Not on file       Vitals:    12/21/20 1347   BP: 124/60   Pulse: 61   Temp: 96 °F (35.6 °C)   SpO2: 94%   Weight: 180 lb (81.6 kg) Labs reviewed in detail. Due for repeat in 6 months. Mixed hyperlipidemia  -     Lipid Panel; Future    Chronic renal impairment, stage 3b  Stable    Mixed stress and urge urinary incontinence  -     oxybutynin (DITROPAN XL) 10 MG extended release tablet; Take 1 tablet by mouth daily  Patient states she was never on a pill previous to Botox injections. Will try Oxybutynin. Vitamin D deficiency  -     Vitamin D 25 Hydroxy; Future    Impaired fasting glucose  -     Hemoglobin A1C; Future        Return in about 3 months (around 3/21/2021) for AWV.       Seen By:  Xavi Oconnor DO

## 2021-01-03 PROBLEM — R60.0 PERIPHERAL EDEMA: Status: RESOLVED | Noted: 2018-10-30 | Resolved: 2021-01-03

## 2021-01-03 PROBLEM — R60.9 PERIPHERAL EDEMA: Status: RESOLVED | Noted: 2018-10-30 | Resolved: 2021-01-03

## 2021-02-22 ENCOUNTER — PROCEDURE VISIT (OUTPATIENT)
Dept: PODIATRY | Age: 86
End: 2021-02-22
Payer: MEDICARE

## 2021-02-22 VITALS — SYSTOLIC BLOOD PRESSURE: 133 MMHG | DIASTOLIC BLOOD PRESSURE: 76 MMHG | TEMPERATURE: 97.6 F

## 2021-02-22 DIAGNOSIS — M79.674 PAIN IN TOE OF RIGHT FOOT: ICD-10-CM

## 2021-02-22 DIAGNOSIS — R26.2 DIFFICULTY WALKING: ICD-10-CM

## 2021-02-22 DIAGNOSIS — G62.9 NEUROPATHY: Primary | ICD-10-CM

## 2021-02-22 DIAGNOSIS — B35.1 TINEA UNGUIUM: ICD-10-CM

## 2021-02-22 DIAGNOSIS — I73.9 PERIPHERAL VASCULAR DISEASE, UNSPECIFIED (HCC): ICD-10-CM

## 2021-02-22 DIAGNOSIS — M79.675 PAIN IN LEFT TOE(S): ICD-10-CM

## 2021-02-22 PROCEDURE — 1123F ACP DISCUSS/DSCN MKR DOCD: CPT | Performed by: PODIATRIST

## 2021-02-22 PROCEDURE — 11721 DEBRIDE NAIL 6 OR MORE: CPT | Performed by: PODIATRIST

## 2021-02-22 PROCEDURE — 1090F PRES/ABSN URINE INCON ASSESS: CPT | Performed by: PODIATRIST

## 2021-02-22 PROCEDURE — 99213 OFFICE O/P EST LOW 20 MIN: CPT | Performed by: PODIATRIST

## 2021-02-22 PROCEDURE — 1036F TOBACCO NON-USER: CPT | Performed by: PODIATRIST

## 2021-02-22 PROCEDURE — 4040F PNEUMOC VAC/ADMIN/RCVD: CPT | Performed by: PODIATRIST

## 2021-02-22 PROCEDURE — G8427 DOCREV CUR MEDS BY ELIG CLIN: HCPCS | Performed by: PODIATRIST

## 2021-02-22 PROCEDURE — G8484 FLU IMMUNIZE NO ADMIN: HCPCS | Performed by: PODIATRIST

## 2021-02-22 PROCEDURE — G8417 CALC BMI ABV UP PARAM F/U: HCPCS | Performed by: PODIATRIST

## 2021-02-22 RX ORDER — KETOCONAZOLE 20 MG/G
CREAM TOPICAL
Qty: 30 G | Refills: 1 | Status: SHIPPED
Start: 2021-02-22 | End: 2021-06-29

## 2021-02-22 RX ORDER — GABAPENTIN 100 MG/1
100 CAPSULE ORAL 3 TIMES DAILY
Qty: 270 CAPSULE | Refills: 1 | Status: SHIPPED
Start: 2021-02-22 | End: 2021-06-29 | Stop reason: SDUPTHER

## 2021-02-22 ASSESSMENT — ENCOUNTER SYMPTOMS
RESPIRATORY NEGATIVE: 1
GASTROINTESTINAL NEGATIVE: 1
BACK PAIN: 1
EYES NEGATIVE: 1

## 2021-02-22 NOTE — PROGRESS NOTES
Kalli   Return Patient    Chief Complaint   Patient presents with    Ingrown Toenail     saw pcp Dr. Faith Mcclure on 12/21/2020    Toe Pain       Subjective: This Chucho Valdez comes to clinic for foot and nail care. Pt currently has complaint of thickened, painful, elongated nails that he/she cannot manage by themselves. Pt. Relates pain to nails with shoe gear. Pt's primary care physician is Faith Mcclure DO. Pt having  Pain numbness  Burning  both feet   No trauma  On gabapentin 100 tid   Past Medical History:   Diagnosis Date    Anxiety with depression     Arrhythmia     PAF    CAD (coronary artery disease)     Chronic kidney disease     stage 3    Chronic pain syndrome     [facility] ([facility] [address]     History of CVA (cerebrovascular accident) without residual deficits     Hyperlipidemia     Hypertension     Osteoarthritis     Shingles     Sleep apnea     TIA (transient ischemic attack)        Allergies   Allergen Reactions    Bactrim [Sulfamethoxazole-Trimethoprim]     Lyrica [Pregabalin]     Prednisone     Sulfa Antibiotics Rash     Current Outpatient Medications on File Prior to Visit   Medication Sig Dispense Refill    Cholecalciferol (VITAMIN D) 50 MCG (2000 UT) CAPS capsule Take by mouth      rosuvastatin (CRESTOR) 20 MG tablet Take 1 tablet by mouth daily 90 tablet 3    HYDROcodone-acetaminophen (NORCO) 7.5-325 MG per tablet Take 1 tablet by mouth every 12 hours as needed for Pain for up to 30 days. Intended supply: 30 days 60 tablet 0    oxybutynin (DITROPAN XL) 10 MG extended release tablet Take 1 tablet by mouth daily 90 tablet 1    donepezil (ARICEPT) 5 MG tablet TAKE ONE TABLET BY MOUTH DAILY WITH BREAKFAST.  90 tablet 1    clopidogrel (PLAVIX) 75 MG tablet TAKE ONE TABLET BY MOUTH DAILY 90 tablet 1    nystatin (MYCOSTATIN) 493405 UNIT/GM powder APPLY TO THE AFFECTED AREA  3 TIMES DAILY 60 g 0    pantoprazole (PROTONIX) 40 MG tablet TAKE ONE TABLET BY MOUTH DAILY 90 tablet 1    ketoconazole (NIZORAL) 2 % shampoo APPLY TOPICALLY DAILY AS NEEDED. 120 mL 0    metoprolol succinate (TOPROL XL) 25 MG extended release tablet Take 0.5 tablets by mouth nightly TAKE 0.5 MG DAILY 90 tablet 3    aspirin 325 MG tablet Take 325 mg by mouth every morning (before breakfast)      sertraline (ZOLOFT) 25 MG tablet Take 1 tablet by mouth daily 1/2 daily 90 tablet 1    dextran 70-hypromellose (TEARS PURE) 0.1-0.3 % SOLN opthalmic solution       Multiple Vitamins-Minerals (CENTRUM SILVER 50+MEN PO) Take by mouth      Cyanocobalamin (B-12) 2500 MCG TABS Take 1 tablet by mouth daily       No current facility-administered medications on file prior to visit. Review of Systems   Constitutional: Negative. HENT: Negative. Eyes: Negative. Respiratory: Negative. Cardiovascular: Negative. Gastrointestinal: Negative. Endocrine: Negative. Genitourinary: Negative. Musculoskeletal: Positive for arthralgias, back pain, gait problem and joint swelling. Objective:  General: AAO x 3 in NAD.     Derm  Toenail Description  Sites of Onychomycosis Involvement (Check affected area)  [x] [x] [x] [x] [x] [x] [x] [x] [x] [x]  5 4 3 2 1 1 2 3 4 5                          Right                                        Left    Thickness  [x] [x] [x] [x] [x] [x] [x] [x] [x] [x]  5 4 3 2 1 1 2 3 4 5                         Right                                        Left    Dystrophic Changes   [x] [x] [x] [x] [x] [x] [x] [x] [x] [x]  5 4 3 2 1 1 2 3 4 5                         Right                                        Left    Color  [x] [x] [x] [x] [x] [x] [x] [x] [x] [x]  5 4 3 2 1 1 2 3 4 5                          Right                                        Left    Incurvation/Ingrowin   [x] [x] [x] [x] [x] [x] [x] [x] [x] [x]  5 4 3 2 1 1 2 3 4 5                         Right                                        Left    Inflammation/Pain   [x] [x] [x] [x] [x] [x] [x] [x] [x] [x]  5 4 3  2 1 1 2 3 4 5                         Right                                        Left      Nails that are described above are all elongated thickened pitting mycotic yellowish incurvated causing pain with both shoe gear. Palpation1-5  Bilateral     Dermatologic Exam:hair loss noted  lower extremity    Skin lesion/ulceration   Skin   Callus neg  Musculoskeletal:     1st MPJ ROM normal, Bilateral.  Muscle strength 5/5, Bilateral.  Pain present upon palpation of toenails 1-5  Bilateral., Bilateral.  Ankle ROM normal,Bilateral.    Dorsally contracted digits, Bilateral.     Vascular:  Pulses   bilateral DP absent    PT absent    Neurological:  Sensation present to light touch to level of digits, Bilateral.    Foot Exam    General  General Appearance: appears stated age and healthy   Orientation: alert and oriented to person, place, and time   Assistance: walker use and wheelchair use       Right Foot/Ankle     Inspection and Palpation  Tenderness: great toe metatarsophalangeal joint and metatarsals   Hallux valgus: yes  Hallux limitus: yes  Skin Exam: abnormal color; Neurovascular  Dorsalis pedis: absent  Posterior tibial: absent  Saphenous nerve sensation: diminished  Tibial nerve sensation: diminished  Superficial peroneal nerve sensation: diminished  Deep peroneal nerve sensation: diminished  Sural nerve sensation: diminished      Left Foot/Ankle      Inspection and Palpation  Tenderness: metatarsals   Hallux valgus: yes  Skin Exam: abnormal color; Neurovascular  Dorsalis pedis: absent  Posterior tibial: absent  Saphenous nerve sensation: diminished  Tibial nerve sensation: diminished  Superficial peroneal nerve sensation: diminished  Deep peroneal nerve sensation: diminished  Sural nerve sensation: diminished             Ortho Exam  Q7   []Yes    []No                Q8   [x]Yes    []No                     Q9   []Yes    []No  Assessment:  80 y.o. female with:   1. Tinea unguium    2. Peripheral vascular disease, unspecified ([facility] [address]    3. Pain in toe of right foot    4. Pain in left toe(s)    5. Difficulty walking    6. Neuropathy     No orders of the defined types were placed in this encounter. Plan:  Pt seen for  chronic pain   oarrs report reviewed I have reviewed current medication  and prior noted I believe the need for refill is warranted  I have reviewed oarrs report and found no drug seeking behavior   I have discussed all side effects and narcotic policy   with pt  pt understands and agrees with treatment  a follow up  appointment is made    Pt was evaluated and examined. Patient was given personalized discharge instructions. Nails 1-10 were debrided in length and thickness sharply with a nail nipper and  without incident. Pt will follow up in 9 weeks or sooner if any problems arise. Diagnosis was discussed with the pt and all of their questions were answered in detail. Proper foot hygiene and care was discussed with the pt. Patient to check feet daily and contact the office with any questions/problems/concerns. Other comorbidity noted and will be managed by PCP. Pain waiver discussed with patient and confirmed.  Refilled gabapentin   2/22/2021      Electronically signed by Yaa Hobson DPM on 2/22/2021 at 2:21 PM  2/22/2021

## 2021-03-23 ENCOUNTER — OFFICE VISIT (OUTPATIENT)
Dept: PRIMARY CARE CLINIC | Age: 86
End: 2021-03-23
Payer: MEDICARE

## 2021-03-23 VITALS
HEART RATE: 65 BPM | SYSTOLIC BLOOD PRESSURE: 132 MMHG | BODY MASS INDEX: 36.36 KG/M2 | OXYGEN SATURATION: 94 % | HEIGHT: 59 IN | DIASTOLIC BLOOD PRESSURE: 76 MMHG | TEMPERATURE: 97.6 F

## 2021-03-23 DIAGNOSIS — F02.818 EARLY ONSET ALZHEIMER'S DISEASE WITH BEHAVIORAL DISTURBANCE (HCC): ICD-10-CM

## 2021-03-23 DIAGNOSIS — G30.0 EARLY ONSET ALZHEIMER'S DISEASE WITH BEHAVIORAL DISTURBANCE (HCC): ICD-10-CM

## 2021-03-23 DIAGNOSIS — Z86.73 HISTORY OF STROKE WITHOUT RESIDUAL DEFICITS: ICD-10-CM

## 2021-03-23 DIAGNOSIS — Z00.00 ROUTINE GENERAL MEDICAL EXAMINATION AT A HEALTH CARE FACILITY: Primary | ICD-10-CM

## 2021-03-23 DIAGNOSIS — I10 ESSENTIAL HYPERTENSION: ICD-10-CM

## 2021-03-23 DIAGNOSIS — M15.9 PRIMARY OSTEOARTHRITIS INVOLVING MULTIPLE JOINTS: ICD-10-CM

## 2021-03-23 DIAGNOSIS — E66.01 MORBIDLY OBESE (HCC): ICD-10-CM

## 2021-03-23 DIAGNOSIS — K21.9 GASTROESOPHAGEAL REFLUX DISEASE WITHOUT ESOPHAGITIS: ICD-10-CM

## 2021-03-23 PROCEDURE — 1123F ACP DISCUSS/DSCN MKR DOCD: CPT | Performed by: FAMILY MEDICINE

## 2021-03-23 PROCEDURE — G8484 FLU IMMUNIZE NO ADMIN: HCPCS | Performed by: FAMILY MEDICINE

## 2021-03-23 PROCEDURE — G0439 PPPS, SUBSEQ VISIT: HCPCS | Performed by: FAMILY MEDICINE

## 2021-03-23 PROCEDURE — 4040F PNEUMOC VAC/ADMIN/RCVD: CPT | Performed by: FAMILY MEDICINE

## 2021-03-23 RX ORDER — HYDROCODONE BITARTRATE AND ACETAMINOPHEN 7.5; 325 MG/1; MG/1
1 TABLET ORAL EVERY 12 HOURS PRN
Qty: 60 TABLET | Refills: 0 | Status: SHIPPED
Start: 2021-04-20 | End: 2021-06-29 | Stop reason: SDUPTHER

## 2021-03-23 RX ORDER — CLOPIDOGREL BISULFATE 75 MG/1
TABLET ORAL
Qty: 90 TABLET | Refills: 1 | Status: SHIPPED
Start: 2021-03-23 | End: 2021-09-28 | Stop reason: SDUPTHER

## 2021-03-23 RX ORDER — METOPROLOL SUCCINATE 25 MG/1
12.5 TABLET, EXTENDED RELEASE ORAL NIGHTLY
Qty: 90 TABLET | Refills: 1 | Status: SHIPPED
Start: 2021-03-23 | End: 2021-09-28 | Stop reason: SDUPTHER

## 2021-03-23 RX ORDER — PANTOPRAZOLE SODIUM 40 MG/1
TABLET, DELAYED RELEASE ORAL
Qty: 90 TABLET | Refills: 1 | Status: SHIPPED
Start: 2021-03-23 | End: 2021-09-28 | Stop reason: SDUPTHER

## 2021-03-23 RX ORDER — HYDROCODONE BITARTRATE AND ACETAMINOPHEN 7.5; 325 MG/1; MG/1
1 TABLET ORAL EVERY 12 HOURS PRN
Qty: 60 TABLET | Refills: 0 | Status: SHIPPED
Start: 2021-05-18 | End: 2021-06-29 | Stop reason: SDUPTHER

## 2021-03-23 RX ORDER — HYDROCODONE BITARTRATE AND ACETAMINOPHEN 7.5; 325 MG/1; MG/1
1 TABLET ORAL EVERY 12 HOURS PRN
Qty: 60 TABLET | Refills: 0 | Status: SHIPPED
Start: 2021-03-23 | End: 2021-06-29 | Stop reason: SDUPTHER

## 2021-03-23 RX ORDER — DONEPEZIL HYDROCHLORIDE 5 MG/1
TABLET, FILM COATED ORAL
Qty: 90 TABLET | Refills: 1 | Status: SHIPPED
Start: 2021-03-23 | End: 2021-06-29 | Stop reason: SDUPTHER

## 2021-03-23 ASSESSMENT — PATIENT HEALTH QUESTIONNAIRE - PHQ9
SUM OF ALL RESPONSES TO PHQ QUESTIONS 1-9: 2
1. LITTLE INTEREST OR PLEASURE IN DOING THINGS: 1
2. FEELING DOWN, DEPRESSED OR HOPELESS: 1
SUM OF ALL RESPONSES TO PHQ QUESTIONS 1-9: 2

## 2021-03-23 ASSESSMENT — LIFESTYLE VARIABLES
HOW OFTEN DO YOU HAVE A DRINK CONTAINING ALCOHOL: NEVER
AUDIT TOTAL SCORE: INCOMPLETE
AUDIT-C TOTAL SCORE: INCOMPLETE

## 2021-03-23 NOTE — PROGRESS NOTES
Medicare Annual Wellness Visit  Name: Hardik Dan Date: 3/23/2021   MRN: [id number] Sex: Female   Age: 80 y.o. Ethnicity: Non-/Non    : 1929 Race: Lisa Rodriguez is here for Medicare AWV    Screenings for behavioral, psychosocial and functional/safety risks, and cognitive dysfunction are all negative except as indicated below. These results, as well as other patient data from the [address] form, are documented in Flowsheets linked to this Encounter. Allergies   Allergen Reactions    Bactrim [Sulfamethoxazole-Trimethoprim]     Lyrica [Pregabalin]     Prednisone     Sulfa Antibiotics Rash         Prior to Visit Medications    Medication Sig Taking? Authorizing Provider   metoprolol succinate (TOPROL XL) 25 MG extended release tablet Take 0.5 tablets by mouth nightly TAKE 0.5 MG DAILY Yes Lisha Asher DO   clopidogrel (PLAVIX) 75 MG tablet TAKE ONE TABLET BY MOUTH DAILY Yes Lisha Asher DO   pantoprazole (PROTONIX) 40 MG tablet TAKE ONE TABLET BY MOUTH DAILY Yes Nasir Redd, DO   donepezil (ARICEPT) 5 MG tablet TAKE ONE TABLET BY MOUTH DAILY WITH BREAKFAST. Yes Lisha Asher, DO   HYDROcodone-acetaminophen (NORCO) 7.5-325 MG per tablet Take 1 tablet by mouth every 12 hours as needed for Pain for up to 30 days. Intended supply: 30 days Yes Lisha Asher, DO   HYDROcodone-acetaminophen (NORCO) 7.5-325 MG per tablet Take 1 tablet by mouth every 12 hours as needed for Pain for up to 30 days. Intended supply: 30 days Yes Lisha Asher, DO   HYDROcodone-acetaminophen (NORCO) 7.5-325 MG per tablet Take 1 tablet by mouth every 12 hours as needed for Pain for up to 30 days. Intended supply: 30 days Yes Lisha Asher DO   diclofenac sodium (VOLTAREN) 1 % GEL Apply 4 g topically 4 times daily as needed for Pain Yes Lisha Asher DO   gabapentin (NEURONTIN) 100 MG capsule Take 1 capsule by mouth 3 times daily for 180 days.  Yes Leonard Servin Adria Bradford DPM   ketoconazole (NIZORAL) 2 % cream Apply topically daily. Yes Rima Byrd DPM   Cholecalciferol (VITAMIN D) 50 MCG (2000 UT) CAPS capsule Take by mouth Yes Historical Provider, MD   rosuvastatin (CRESTOR) 20 MG tablet Take 1 tablet by mouth daily Yes Lisha Asher,    oxybutynin (DITROPAN XL) 10 MG extended release tablet Take 1 tablet by mouth daily Yes Cathie Mon,    nystatin (MYCOSTATIN) 223861 UNIT/GM powder APPLY TO THE AFFECTED AREA  3 TIMES DAILY Yes Lisha Asher DO   ketoconazole (NIZORAL) 2 % shampoo APPLY TOPICALLY DAILY AS NEEDED.  Yes Cathie Mon,    aspirin 325 MG tablet Take 325 mg by mouth every morning (before breakfast) Yes Historical Provider, MD   sertraline (ZOLOFT) 25 MG tablet Take 1 tablet by mouth daily 1/2 daily Yes LILIA Nolasco - CNP   dextran 70-hypromellose (TEARS PURE) 0.1-0.3 % SOLN opthalmic solution  Yes Historical Provider, MD   Multiple Vitamins-Minerals (CENTRUM SILVER 50+MEN PO) Take by mouth Yes Historical Provider, MD   Cyanocobalamin (B-12) 2500 MCG TABS Take 1 tablet by mouth daily Yes Historical Provider, MD         Past Medical History:   Diagnosis Date    Anxiety with depression     Arrhythmia     PAF    CAD (coronary artery disease)     Chronic kidney disease     stage 3    Chronic pain syndrome     Colon cancer ([facility] [address]     History of CVA (cerebrovascular accident) without residual deficits     Hyperlipidemia     Hypertension     Osteoarthritis     Shingles     Sleep apnea     TIA (transient ischemic attack)        Past Surgical History:   Procedure Laterality Date    BARTHOLIN GLAND CYST EXCISION      CARPAL TUNNEL RELEASE      CATARACT REMOVAL      COLECTOMY      with colostomy    DIAGNOSTIC CARDIAC CATH LAB PROCEDURE      drug eluting stent proximal RCA and diagonal branch    HYSTERECTOMY      LUMBAR LAMINECTOMY           Family History   Problem Relation Age of Onset    Heart Disease Mother         age 80  Colon Cancer Father         age 80    No Known Problems Brother     Cancer Brother         liver       CareTeam (Including outside providers/suppliers regularly involved in providing care):   Patient Care Team:  Katerine Jerez DO as PCP - General (Family Medicine)  Katerine Jerez DO as PCP - Miki Hardy Provider  [facility], DPM as Consulting Physician (Podiatry)    Wt Readings from Last 3 Encounters:   12/21/20 180 lb (81.6 kg)   08/24/20 178 lb (80.7 kg)   06/23/20 183 lb (83 kg)     Vitals:    03/23/21 1532   BP: 132/76   Pulse: 65   Temp: 97.6 °F (36.4 °C)   SpO2: 94%   Height: 4' 11\" (1.499 m)     Body mass index is 36.36 kg/m². Based upon direct observation of the patient, evaluation of cognition reveals remote memory intact, recent memory impaired. Physical Exam  Vitals signs and nursing note reviewed. Constitutional:       General: She is not in acute distress. Appearance: Normal appearance. She is well-developed. She is obese. She is not ill-appearing. HENT:      Head: Normocephalic and atraumatic. Right Ear: Hearing and external ear normal.      Left Ear: Hearing and external ear normal.      Nose:      Comments: Wearing mask  Eyes:      General: Lids are normal. No scleral icterus. Extraocular Movements: Extraocular movements intact. Conjunctiva/sclera: Conjunctivae normal.   Neck:      Musculoskeletal: Normal range of motion and neck supple. Thyroid: No thyromegaly. Cardiovascular:      Rate and Rhythm: Normal rate and regular rhythm. Heart sounds: Normal heart sounds. No murmur. Pulmonary:      Effort: Pulmonary effort is normal. No respiratory distress. Breath sounds: Normal breath sounds. No wheezing. Musculoskeletal:         General: No tenderness or deformity. Right lower leg: No edema. Left lower leg: No edema. Lymphadenopathy:      Cervical: No cervical adenopathy. Skin:     General: Skin is warm and dry. Findings: No rash. Neurological:      General: No focal deficit present. Mental Status: She is alert and oriented to person, place, and time. Gait: Gait abnormal (patient seated in wheelchair). Psychiatric:         Mood and Affect: Mood and affect normal.         Speech: Speech normal.         Behavior: Behavior normal.         Thought Content: Thought content normal.         Patient's complete Health Risk Assessment and screening values have been reviewed and are found in Flowsheets. The following problems were reviewed today and where indicated follow up appointments were made and/or referrals ordered. Positive Risk Factor Screenings with Interventions:            General Health and ACP:  General  In general, how would you say your health is?: (!) Poor  In the past 7 days, have you experienced any of the following? New or Increased Pain, New or Increased Fatigue, Loneliness, Social Isolation, Stress or Anger?: (!) New or Increased Fatigue, Loneliness  Do you get the social and emotional support that you need?: Yes  Do you have a Living Will?: Yes  Advance Directives     Power of  Living Will ACP-Advance Directive ACP-Power of     Not on File Not on File Not on File Not on File      General Health Risk Interventions:  · Poor self-assessment of health status:  We discussed continued monitoring of chronic conditions- check labs next visit  · Fatigue: mostly due to inactivity and chronic medical issues  · Loneliness: secondary to pandemic    Health Habits/Nutrition:  Health Habits/Nutrition  Do you exercise for at least 20 minutes 2-3 times per week?: (!) No  Have you lost any weight without trying in the past 3 months?: No  Do you eat only one meal per day?: (!) Yes  Have you seen the dentist within the past year?: N/A - wear dentures     Health Habits/Nutrition Interventions:  · Inadequate physical activity:  patient is not ready to increase his/her physical activity level at this time  · Nutritional issues:  patient is not ready to address his/her nutritional/weight issues at this time    Hearing/Vision:  No exam data present  Hearing/Vision  Do you or your family notice any trouble with your hearing that hasn't been managed with hearing aids?: No  Do you have difficulty driving, watching TV, or doing any of your daily activities because of your eyesight?: (!) Yes  Have you had an eye exam within the past year?: Yes  Hearing/Vision Interventions:  · Vision concerns:  patient encouraged to make appointment with his/her eye specialist    Safety:  Safety  Do you have working smoke detectors?: Yes  Have all throw rugs been removed or fastened?: (!) No  Do you have non-slip mats or surfaces in all bathtubs/showers?: Yes  Do all of your stairways have a railing or banister?: Yes  Are your doorways, halls and stairs free of clutter?: Yes  Do you always fasten your seatbelt when you are in a car?: Yes  Safety Interventions:  · Home safety tips provided    ADL:  ADLs  In the past 7 days, did you need help from others to take care of any of the following?  Laundry, housekeeping, banking/finances, shopping, telephone use, food preparation, transportation, or taking medications?: (!) Laundry, Housekeeping, Banking/Finances, Shopping, Food Preparation, Transportation, Taking Medications  ADL Interventions:  · Patient declines any further evaluation/treatment for this issue      Personalized Preventive Plan   Current Health Maintenance Status  Immunization History   Administered Date(s) Administered    Influenza Virus Vaccine 12/17/2013, 11/10/2014, 09/15/2015, 09/06/2016, 10/31/2017    Influenza, Quadv, adjuvanted, 65 yrs +, IM, PF (Fluad) 11/02/2020    Influenza, Triv, inactivated, subunit, adjuvanted, IM (Fluad 65 yrs and older) 09/24/2019    Pneumococcal Conjugate 13-valent ([id number]) 02/06/2015, 01/14/2019    Pneumococcal Conjugate 7-valent (Prevnar7) 02/06/2015    Pneumococcal Polysaccharide days  -     diclofenac sodium (VOLTAREN) 1 % GEL; Apply 4 g topically 4 times daily as needed for Pain  OARRS appropriate    History of stroke without residual deficits  -     metoprolol succinate (TOPROL XL) 25 MG extended release tablet; Take 0.5 tablets by mouth nightly TAKE 0.5 MG DAILY  -     clopidogrel (PLAVIX) 75 MG tablet; TAKE ONE TABLET BY MOUTH DAILY    Morbidly obese ([address]  Discussed increased activity      Return in about 3 months (around 6/23/2021) for Chronic pain medication refills.       Seen By:  Faith Mcclure, DO

## 2021-03-23 NOTE — PATIENT INSTRUCTIONS
Personalized Preventive Plan for Enmanuel Thomas - 3/23/2021  Medicare offers a range of preventive health benefits. Some of the tests and screenings are paid in full while other may be subject to a deductible, co-insurance, and/or copay. Some of these benefits include a comprehensive review of your medical history including lifestyle, illnesses that may run in your family, and various assessments and screenings as appropriate. After reviewing your medical record and screening and assessments performed today your provider may have ordered immunizations, labs, imaging, and/or referrals for you. A list of these orders (if applicable) as well as your Preventive Care list are included within your After Visit Summary for your review. Other Preventive Recommendations:    · A preventive eye exam performed by an eye specialist is recommended every 1-2 years to screen for glaucoma; cataracts, macular degeneration, and other eye disorders. · A preventive dental visit is recommended every 6 months. · Try to get at least 150 minutes of exercise per week or 10,000 steps per day on a pedometer . · Order or download the FREE \"Exercise & Physical Activity: Your Everyday Guide\" from [employer]. Call 5-254.706.6095 or search [employer] online. · You need 0086-1899 mg of calcium and 6123-7198 IU of vitamin D per day. It is possible to meet your calcium requirement with diet alone, but a vitamin D supplement is usually necessary to meet this goal.  · When exposed to the sun, use a sunscreen that protects against both UVA and UVB radiation with an SPF of 30 or greater. Reapply every 2 to 3 hours or after sweating, drying off with a towel, or swimming. · Always wear a seat belt when traveling in a car. Always wear a helmet when riding a bicycle or motorcycle.

## 2021-06-23 ENCOUNTER — PROCEDURE VISIT (OUTPATIENT)
Dept: PODIATRY | Age: 86
End: 2021-06-23
Payer: MEDICARE

## 2021-06-23 VITALS — DIASTOLIC BLOOD PRESSURE: 82 MMHG | SYSTOLIC BLOOD PRESSURE: 132 MMHG | TEMPERATURE: 97.9 F

## 2021-06-23 DIAGNOSIS — I73.9 PERIPHERAL VASCULAR DISEASE, UNSPECIFIED (HCC): ICD-10-CM

## 2021-06-23 DIAGNOSIS — B35.1 TINEA UNGUIUM: Primary | ICD-10-CM

## 2021-06-23 DIAGNOSIS — M79.674 PAIN IN TOE OF RIGHT FOOT: ICD-10-CM

## 2021-06-23 DIAGNOSIS — I10 ESSENTIAL HYPERTENSION: ICD-10-CM

## 2021-06-23 DIAGNOSIS — E78.2 MIXED HYPERLIPIDEMIA: ICD-10-CM

## 2021-06-23 DIAGNOSIS — R73.01 IMPAIRED FASTING GLUCOSE: ICD-10-CM

## 2021-06-23 DIAGNOSIS — R26.2 DIFFICULTY WALKING: ICD-10-CM

## 2021-06-23 DIAGNOSIS — M79.675 PAIN IN LEFT TOE(S): ICD-10-CM

## 2021-06-23 DIAGNOSIS — E55.9 VITAMIN D DEFICIENCY: ICD-10-CM

## 2021-06-23 LAB
ALBUMIN SERPL-MCNC: 3.9 G/DL (ref 3.5–5.2)
ALP BLD-CCNC: 58 U/L (ref 35–104)
ALT SERPL-CCNC: 7 U/L (ref 0–32)
ANION GAP SERPL CALCULATED.3IONS-SCNC: 8 MMOL/L (ref 7–16)
AST SERPL-CCNC: 15 U/L (ref 0–31)
BACTERIA: ABNORMAL /HPF
BASOPHILS ABSOLUTE: 0.03 E9/L (ref 0–0.2)
BASOPHILS RELATIVE PERCENT: 0.5 % (ref 0–2)
BILIRUB SERPL-MCNC: 0.4 MG/DL (ref 0–1.2)
BILIRUBIN URINE: NEGATIVE
BLOOD, URINE: NEGATIVE
BUN BLDV-MCNC: 18 MG/DL (ref 6–23)
CALCIUM SERPL-MCNC: 9.1 MG/DL (ref 8.6–10.2)
CHLORIDE BLD-SCNC: 107 MMOL/L (ref 98–107)
CHOLESTEROL, TOTAL: 126 MG/DL (ref 0–199)
CLARITY: CLEAR
CO2: 28 MMOL/L (ref 22–29)
COLOR: YELLOW
CREAT SERPL-MCNC: 1.3 MG/DL (ref 0.5–1)
EOSINOPHILS ABSOLUTE: 0.09 E9/L (ref 0.05–0.5)
EOSINOPHILS RELATIVE PERCENT: 1.6 % (ref 0–6)
EPITHELIAL CELLS, UA: ABNORMAL /HPF
GFR AFRICAN AMERICAN: 46
GFR NON-AFRICAN AMERICAN: 38 ML/MIN/1.73
GLUCOSE BLD-MCNC: 92 MG/DL (ref 74–99)
GLUCOSE URINE: NEGATIVE MG/DL
HBA1C MFR BLD: 5.1 % (ref 4–5.6)
HCT VFR BLD CALC: 38.4 % (ref 34–48)
HDLC SERPL-MCNC: 48 MG/DL
HEMOGLOBIN: 11.7 G/DL (ref 11.5–15.5)
IMMATURE GRANULOCYTES #: 0.03 E9/L
IMMATURE GRANULOCYTES %: 0.5 % (ref 0–5)
KETONES, URINE: ABNORMAL MG/DL
LDL CHOLESTEROL CALCULATED: 56 MG/DL (ref 0–99)
LEUKOCYTE ESTERASE, URINE: ABNORMAL
LYMPHOCYTES ABSOLUTE: 1.1 E9/L (ref 1.5–4)
LYMPHOCYTES RELATIVE PERCENT: 19.9 % (ref 20–42)
MCH RBC QN AUTO: 29.5 PG (ref 26–35)
MCHC RBC AUTO-ENTMCNC: 30.5 % (ref 32–34.5)
MCV RBC AUTO: 97 FL (ref 80–99.9)
MONOCYTES ABSOLUTE: 0.56 E9/L (ref 0.1–0.95)
MONOCYTES RELATIVE PERCENT: 10.1 % (ref 2–12)
NEUTROPHILS ABSOLUTE: 3.72 E9/L (ref 1.8–7.3)
NEUTROPHILS RELATIVE PERCENT: 67.4 % (ref 43–80)
NITRITE, URINE: NEGATIVE
PDW BLD-RTO: 14.1 FL (ref 11.5–15)
PH UA: 5.5 (ref 5–9)
PLATELET # BLD: 57 E9/L (ref 130–450)
PLATELET CONFIRMATION: NORMAL
PMV BLD AUTO: 11.6 FL (ref 7–12)
POTASSIUM SERPL-SCNC: 4.1 MMOL/L (ref 3.5–5)
PROTEIN UA: ABNORMAL MG/DL
RBC # BLD: 3.96 E12/L (ref 3.5–5.5)
RBC UA: ABNORMAL /HPF (ref 0–2)
SODIUM BLD-SCNC: 143 MMOL/L (ref 132–146)
SPECIFIC GRAVITY UA: >=1.03 (ref 1–1.03)
TOTAL PROTEIN: 6.8 G/DL (ref 6.4–8.3)
TRIGL SERPL-MCNC: 109 MG/DL (ref 0–149)
TSH SERPL DL<=0.05 MIU/L-ACNC: 2.74 UIU/ML (ref 0.27–4.2)
UROBILINOGEN, URINE: 1 E.U./DL
VITAMIN D 25-HYDROXY: 48 NG/ML (ref 30–100)
VLDLC SERPL CALC-MCNC: 22 MG/DL
WBC # BLD: 5.5 E9/L (ref 4.5–11.5)
WBC UA: ABNORMAL /HPF (ref 0–5)

## 2021-06-23 PROCEDURE — 11721 DEBRIDE NAIL 6 OR MORE: CPT | Performed by: PODIATRIST

## 2021-06-23 ASSESSMENT — ENCOUNTER SYMPTOMS
RESPIRATORY NEGATIVE: 1
GASTROINTESTINAL NEGATIVE: 1
BACK PAIN: 1
EYES NEGATIVE: 1

## 2021-06-23 NOTE — PROGRESS NOTES
[facility]  Return Patient    Chief Complaint   Patient presents with    Toe Pain     saw pcp dr. Berhane Miramontes 3/23/2021       Subjective: This Bibi Pathak comes to clinic for foot and nail care. Pt currently has complaint of thickened, painful, elongated nails that he/she cannot manage by themselves. Pt. Relates pain to nails with shoe gear. Pt's primary care physician is Berhane Miramontes DO. Pt having  Pain numbness  Burning  both feet   No trauma  On gabapentin 100 tid   Past Medical History:   Diagnosis Date    Anxiety with depression     Arrhythmia     PAF    CAD (coronary artery disease)     Chronic kidney disease     stage 3    Chronic pain syndrome     Colon cancer (HCC)     History of CVA (cerebrovascular accident) without residual deficits     Hyperlipidemia     Hypertension     Osteoarthritis     Shingles     Sleep apnea     TIA (transient ischemic attack)        Allergies   Allergen Reactions    Bactrim [Sulfamethoxazole-Trimethoprim]     Lyrica [Pregabalin]     Prednisone     Sulfa Antibiotics Rash     Current Outpatient Medications on File Prior to Visit   Medication Sig Dispense Refill    metoprolol succinate (TOPROL XL) 25 MG extended release tablet Take 0.5 tablets by mouth nightly TAKE 0.5 MG DAILY 90 tablet 1    clopidogrel (PLAVIX) 75 MG tablet TAKE ONE TABLET BY MOUTH DAILY 90 tablet 1    pantoprazole (PROTONIX) 40 MG tablet TAKE ONE TABLET BY MOUTH DAILY 90 tablet 1    donepezil (ARICEPT) 5 MG tablet TAKE ONE TABLET BY MOUTH DAILY WITH BREAKFAST. 90 tablet 1    gabapentin (NEURONTIN) 100 MG capsule Take 1 capsule by mouth 3 times daily for 180 days. 270 capsule 1    ketoconazole (NIZORAL) 2 % cream Apply topically daily.  30 g 1    Cholecalciferol (VITAMIN D) 50 MCG (2000 UT) CAPS capsule Take by mouth      rosuvastatin (CRESTOR) 20 MG tablet Take 1 tablet by mouth daily 90 tablet 3    oxybutynin (DITROPAN XL) 10 MG extended release tablet Take 1 tablet by mouth daily 90 tablet 1    nystatin (MYCOSTATIN) 268991 UNIT/GM powder APPLY TO THE AFFECTED AREA  3 TIMES DAILY 60 g 0    ketoconazole (NIZORAL) 2 % shampoo APPLY TOPICALLY DAILY AS NEEDED. 120 mL 0    aspirin 325 MG tablet Take 325 mg by mouth every morning (before breakfast)      sertraline (ZOLOFT) 25 MG tablet Take 1 tablet by mouth daily 1/2 daily 90 tablet 1    dextran 70-hypromellose (TEARS PURE) 0.1-0.3 % SOLN opthalmic solution       Multiple Vitamins-Minerals (CENTRUM SILVER 50+MEN PO) Take by mouth      Cyanocobalamin (B-12) 2500 MCG TABS Take 1 tablet by mouth daily      diclofenac sodium (VOLTAREN) 1 % GEL Apply 4 g topically 4 times daily as needed for Pain 100 g 1     No current facility-administered medications on file prior to visit. Review of Systems   Constitutional: Negative. HENT: Negative. Eyes: Negative. Respiratory: Negative. Cardiovascular: Negative. Gastrointestinal: Negative. Endocrine: Negative. Genitourinary: Negative. Musculoskeletal: Positive for arthralgias, back pain, gait problem and joint swelling. Objective:  General: AAO x 3 in NAD.     Derm  Toenail Description  Sites of Onychomycosis Involvement (Check affected area)  [x] [x] [x] [x] [x] [x] [x] [x] [x] [x]  5 4 3 2 1 1 2 3 4 5                          Right                                        Left    Thickness  [x] [x] [x] [x] [x] [x] [x] [x] [x] [x]  5 4 3 2 1 1 2 3 4 5                         Right                                        Left    Dystrophic Changes   [x] [x] [x] [x] [x] [x] [x] [x] [x] [x]  5 4 3 2 1 1 2 3 4 5                         Right                                        Left    Color  [x] [x] [x] [x] [x] [x] [x] [x] [x] [x]  5 4 3 2 1 1 2 3 4 5                          Right                                        Left    Incurvation/Ingrowin   [x] [x] [x] [x] [x] [x] [x] [x] [x] [x]  5 4 3 2 1 1 2 3 4 5                         Right []Yes    []No  Assessment:  80 y.o. female with:   1. Tinea unguium    2. Peripheral vascular disease, unspecified ([address]    3. Pain in toe of right foot    4. Pain in left toe(s)    5. Difficulty walking     No orders of the defined types were placed in this encounter. Plan:  Pt seen for  chronic pain   oarrs report reviewed I have reviewed current medication  and prior noted I believe the need for refill is warranted  I have reviewed oarrs report and found no drug seeking behavior   I have discussed all side effects and narcotic policy   with pt  pt understands and agrees with treatment  a follow up  appointment is made    Pt was evaluated and examined. Patient was given personalized discharge instructions. Nails 1-10 were debrided in length and thickness sharply with a nail nipper and  without incident. Pt will follow up in 9 weeks or sooner if any problems arise. Diagnosis was discussed with the pt and all of their questions were answered in detail. Proper foot hygiene and care was discussed with the pt. Patient to check feet daily and contact the office with any questions/problems/concerns. Other comorbidity noted and will be managed by PCP. Pain waiver discussed with patient and confirmed.  Refilled gabapentin   6/23/2021      Electronically signed by Nick Gilliland DPM on 6/23/2021 at 9:00 AM  6/23/2021

## 2021-06-29 ENCOUNTER — OFFICE VISIT (OUTPATIENT)
Dept: PRIMARY CARE CLINIC | Age: 86
End: 2021-06-29
Payer: MEDICARE

## 2021-06-29 VITALS
TEMPERATURE: 97.1 F | OXYGEN SATURATION: 93 % | DIASTOLIC BLOOD PRESSURE: 62 MMHG | SYSTOLIC BLOOD PRESSURE: 122 MMHG | HEIGHT: 59 IN | HEART RATE: 69 BPM | BODY MASS INDEX: 36.89 KG/M2 | WEIGHT: 183 LBS

## 2021-06-29 DIAGNOSIS — I25.10 CORONARY ARTERY DISEASE INVOLVING NATIVE CORONARY ARTERY OF NATIVE HEART WITHOUT ANGINA PECTORIS: ICD-10-CM

## 2021-06-29 DIAGNOSIS — F02.818 EARLY ONSET ALZHEIMER'S DISEASE WITH BEHAVIORAL DISTURBANCE (HCC): ICD-10-CM

## 2021-06-29 DIAGNOSIS — G30.0 EARLY ONSET ALZHEIMER'S DISEASE WITH BEHAVIORAL DISTURBANCE (HCC): ICD-10-CM

## 2021-06-29 DIAGNOSIS — N18.32 CHRONIC RENAL IMPAIRMENT, STAGE 3B (HCC): ICD-10-CM

## 2021-06-29 DIAGNOSIS — N32.81 OAB (OVERACTIVE BLADDER): ICD-10-CM

## 2021-06-29 DIAGNOSIS — M15.9 PRIMARY OSTEOARTHRITIS INVOLVING MULTIPLE JOINTS: ICD-10-CM

## 2021-06-29 DIAGNOSIS — E66.01 MORBIDLY OBESE (HCC): ICD-10-CM

## 2021-06-29 DIAGNOSIS — E55.9 VITAMIN D INSUFFICIENCY: ICD-10-CM

## 2021-06-29 DIAGNOSIS — I10 ESSENTIAL HYPERTENSION: Primary | ICD-10-CM

## 2021-06-29 DIAGNOSIS — E78.2 MIXED HYPERLIPIDEMIA: ICD-10-CM

## 2021-06-29 DIAGNOSIS — R73.01 IMPAIRED FASTING GLUCOSE: ICD-10-CM

## 2021-06-29 PROCEDURE — G8417 CALC BMI ABV UP PARAM F/U: HCPCS | Performed by: FAMILY MEDICINE

## 2021-06-29 PROCEDURE — 1123F ACP DISCUSS/DSCN MKR DOCD: CPT | Performed by: FAMILY MEDICINE

## 2021-06-29 PROCEDURE — 99214 OFFICE O/P EST MOD 30 MIN: CPT | Performed by: FAMILY MEDICINE

## 2021-06-29 PROCEDURE — G8427 DOCREV CUR MEDS BY ELIG CLIN: HCPCS | Performed by: FAMILY MEDICINE

## 2021-06-29 PROCEDURE — 1036F TOBACCO NON-USER: CPT | Performed by: FAMILY MEDICINE

## 2021-06-29 PROCEDURE — 1090F PRES/ABSN URINE INCON ASSESS: CPT | Performed by: FAMILY MEDICINE

## 2021-06-29 PROCEDURE — 4040F PNEUMOC VAC/ADMIN/RCVD: CPT | Performed by: FAMILY MEDICINE

## 2021-06-29 RX ORDER — DONEPEZIL HYDROCHLORIDE 5 MG/1
TABLET, FILM COATED ORAL
Qty: 90 TABLET | Refills: 1 | Status: SHIPPED
Start: 2021-06-29 | End: 2022-01-07 | Stop reason: SDUPTHER

## 2021-06-29 RX ORDER — HYDROCODONE BITARTRATE AND ACETAMINOPHEN 7.5; 325 MG/1; MG/1
1 TABLET ORAL EVERY 12 HOURS PRN
COMMUNITY
End: 2022-04-06 | Stop reason: SDUPTHER

## 2021-06-29 RX ORDER — GABAPENTIN 100 MG/1
100 CAPSULE ORAL 3 TIMES DAILY
Qty: 270 CAPSULE | Refills: 1 | Status: SHIPPED
Start: 2021-06-29 | End: 2021-09-13 | Stop reason: SDUPTHER

## 2021-06-29 RX ORDER — HYDROCODONE BITARTRATE AND ACETAMINOPHEN 7.5; 325 MG/1; MG/1
1 TABLET ORAL EVERY 12 HOURS PRN
Qty: 60 TABLET | Refills: 0 | Status: SHIPPED
Start: 2021-06-29 | End: 2021-09-28 | Stop reason: SDUPTHER

## 2021-06-29 RX ORDER — HYDROCODONE BITARTRATE AND ACETAMINOPHEN 7.5; 325 MG/1; MG/1
1 TABLET ORAL EVERY 12 HOURS PRN
Qty: 60 TABLET | Refills: 0 | Status: SHIPPED
Start: 2021-07-27 | End: 2021-09-28 | Stop reason: SDUPTHER

## 2021-06-29 RX ORDER — HYDROCODONE BITARTRATE AND ACETAMINOPHEN 7.5; 325 MG/1; MG/1
1 TABLET ORAL EVERY 12 HOURS PRN
Qty: 60 TABLET | Refills: 0 | Status: SHIPPED
Start: 2021-08-24 | End: 2021-09-28 | Stop reason: SDUPTHER

## 2021-06-29 ASSESSMENT — ENCOUNTER SYMPTOMS
NAUSEA: 0
DIARRHEA: 0
SHORTNESS OF BREATH: 0
COUGH: 0
ABDOMINAL PAIN: 0
VOMITING: 0
WHEEZING: 0
CONSTIPATION: 0
BACK PAIN: 1

## 2021-06-29 NOTE — PROGRESS NOTES
21  Darvin Negron : 1929 Sex: female  Age: 80 y.o. Chief Complaint   Patient presents with    Medication Refill     HPI:  80 y.o. female presents today for 3 month follow up of chronic medical conditions, medication refills and FBW results. Patient's chart, medical, surgical and medication history all reviewed. Chronic Pain  Patient has long standing history of chronic back pain with numbness and tingling in her feet. Has had long standing lumbar disc disease with past laminectomy. She has been on Norco for many years. Compliant with follow ups. Noting that she is having more pain in the lateral hips and into her legs. Does not walk much anymore- mostly sedentary. Coronary Artery Disease   Patient presents for routine coronary artery disease follow-up. Cardiac risk factors include advanced age (older than 54 for men, 72 for women), dyslipidemia, hypertension, obesity (BMI >= 30 kg/m2) and sedentary lifestyle. Patient has a history of stent placement years ago. She is  on ASA/Statin/BB/ACEi. Incontinence  Patient also has a long standing history of incontinence. Previously received Botox injections that were very helpful. Since they've worn off, patient having recurrent issues. Does not want to have injections again. Tried Oxybutynin, but didn't notice a difference. ROS:  Review of Systems   Constitutional: Negative for chills, fatigue and fever. Respiratory: Negative for cough, shortness of breath and wheezing. Cardiovascular: Negative for chest pain and palpitations. Gastrointestinal: Negative for abdominal pain, constipation, diarrhea, nausea and vomiting. Genitourinary: Positive for frequency (incontinence). Musculoskeletal: Positive for arthralgias, back pain and gait problem. Skin: Negative for rash. Neurological: Positive for weakness and numbness. Negative for dizziness and headaches. Psychiatric/Behavioral: Negative for dysphoric mood.  The patient is not nervous/anxious. All other systems reviewed and are negative. Current Outpatient Medications on File Prior to Visit   Medication Sig Dispense Refill    HYDROcodone-acetaminophen (NORCO) 7.5-325 MG per tablet Take 1 tablet by mouth every 12 hours as needed for Pain.  metoprolol succinate (TOPROL XL) 25 MG extended release tablet Take 0.5 tablets by mouth nightly TAKE 0.5 MG DAILY 90 tablet 1    clopidogrel (PLAVIX) 75 MG tablet TAKE ONE TABLET BY MOUTH DAILY 90 tablet 1    pantoprazole (PROTONIX) 40 MG tablet TAKE ONE TABLET BY MOUTH DAILY 90 tablet 1    rosuvastatin (CRESTOR) 20 MG tablet Take 1 tablet by mouth daily 90 tablet 3    aspirin 325 MG tablet Take 325 mg by mouth every morning (before breakfast)      sertraline (ZOLOFT) 25 MG tablet Take 1 tablet by mouth daily 1/2 daily 90 tablet 1    diclofenac sodium (VOLTAREN) 1 % GEL Apply 4 g topically 4 times daily as needed for Pain 100 g 1     No current facility-administered medications on file prior to visit.        Allergies   Allergen Reactions    Bactrim [Sulfamethoxazole-Trimethoprim]     Lyrica [Pregabalin]     Prednisone     Sulfa Antibiotics Rash       Past Medical History:   Diagnosis Date    Anxiety with depression     Arrhythmia     PAF    CAD (coronary artery disease)     Chronic kidney disease     stage 3    Chronic pain syndrome     Colon cancer (HCC)     History of CVA (cerebrovascular accident) without residual deficits     Hyperlipidemia     Hypertension     Osteoarthritis     Shingles     Sleep apnea     TIA (transient ischemic attack)      Past Surgical History:   Procedure Laterality Date    BARTHOLIN GLAND CYST EXCISION      CARPAL TUNNEL RELEASE      CATARACT REMOVAL      COLECTOMY      with colostomy    DIAGNOSTIC CARDIAC CATH LAB PROCEDURE      drug eluting stent proximal RCA and diagonal branch    HYSTERECTOMY      LUMBAR LAMINECTOMY       Family History   Problem Relation Age of Onset    Heart Disease Mother         age 80    Colon Cancer Father         age 80    No Known Problems Brother     Cancer Brother         liver     Social History     Socioeconomic History    Marital status:      Spouse name: Not on file    Number of children: Not on file    Years of education: Not on file    Highest education level: Not on file   Occupational History    Occupation: retired various jobs/sales   Tobacco Use    Smoking status: Never Smoker    Smokeless tobacco: Never Used   Vaping Use    Vaping Use: Never used   Substance and Sexual Activity    Alcohol use: Never    Drug use: Never    Sexual activity: Not Currently     Partners: Male     Birth control/protection: Post-menopausal   Other Topics Concern    Not on file   Social History Narrative    Not on file     Social Determinants of Health     Financial Resource Strain:     Difficulty of Paying Living Expenses:    Food Insecurity:     Worried About [employer] in the Last Year:     [employer] in the Last Year:    Transportation Needs:     Lack of Transportation (Medical):  Lack of Transportation (Non-Medical):    Physical Activity:     Days of Exercise per Week:     Minutes of Exercise per Session:    Stress:     Feeling of Stress :    Social Connections:     Frequency of Communication with Friends and Family:     Frequency of Social Gatherings with Friends and Family:     Attends Rastafari Services:     Active Member of Clubs or Organizations:     Attends Club or Organization Meetings:     Marital Status:    Intimate Partner Violence:     Fear of Current or Ex-Partner:     Emotionally Abused:     Physically Abused:     Sexually Abused:        Vitals:    06/29/21 1437   BP: 122/62   Pulse: 69   Temp: 97.1 °F (36.2 °C)   SpO2: 93%   Weight: 183 lb (83 kg)   Height: 4' 11\" (1.499 m)       Physical Exam:  Physical Exam  Vitals and nursing note reviewed.    Constitutional:       General: She is not in acute distress. Appearance: Normal appearance. She is well-developed. She is obese. She is not ill-appearing. HENT:      Head: Normocephalic and atraumatic. Right Ear: Hearing and external ear normal.      Left Ear: Hearing and external ear normal.      Nose:      Comments: Wearing mask  Eyes:      General: Lids are normal. No scleral icterus. Extraocular Movements: Extraocular movements intact. Conjunctiva/sclera: Conjunctivae normal.   Neck:      Thyroid: No thyromegaly. Cardiovascular:      Rate and Rhythm: Normal rate and regular rhythm. Heart sounds: Normal heart sounds. No murmur heard. Pulmonary:      Effort: Pulmonary effort is normal. No respiratory distress. Breath sounds: Normal breath sounds. No wheezing. Musculoskeletal:         General: No tenderness or deformity. Cervical back: Normal range of motion and neck supple. Right lower leg: No edema. Left lower leg: No edema. Lymphadenopathy:      Cervical: No cervical adenopathy. Skin:     General: Skin is warm and dry. Findings: No rash. Neurological:      General: No focal deficit present. Mental Status: She is alert and oriented to person, place, and time. Gait: Gait abnormal (patient seated in wheelchair). Psychiatric:         Mood and Affect: Mood and affect normal.         Speech: Speech normal.         Behavior: Behavior normal.         Thought Content:  Thought content normal.         Labs:  CBC with Differential:    Lab Results   Component Value Date    WBC 5.5 06/23/2021    RBC 3.96 06/23/2021    HGB 11.7 06/23/2021    HCT 38.4 06/23/2021    PLT 57 06/23/2021    MCV 97.0 06/23/2021    MCH 29.5 06/23/2021    MCHC 30.5 06/23/2021    RDW 14.1 06/23/2021    LYMPHOPCT 19.9 06/23/2021    MONOPCT 10.1 06/23/2021    BASOPCT 0.5 06/23/2021    MONOSABS 0.56 06/23/2021    LYMPHSABS 1.10 06/23/2021    EOSABS 0.09 06/23/2021    BASOSABS 0.03 06/23/2021     CMP:    Lab Results Component Value Date     06/23/2021    K 4.1 06/23/2021     06/23/2021    CO2 28 06/23/2021    BUN 18 06/23/2021    CREATININE 1.3 06/23/2021    CREATININE 1.3 12/14/2018    GFRAA 46 06/23/2021    LABGLOM 38 06/23/2021    GLUCOSE 92 06/23/2021    PROT 6.8 06/23/2021    LABALBU 3.9 06/23/2021    CALCIUM 9.1 06/23/2021    BILITOT 0.4 06/23/2021    ALKPHOS 58 06/23/2021    AST 15 06/23/2021    ALT 7 06/23/2021     Uric Acid:  No results found for: Pina Vasquez  Microalbumen/Creatinine ratio:  No components found for: RUCREAT  FLP:    Lab Results   Component Value Date    TRIG 109 06/23/2021    HDL 48 06/23/2021    LDLCALC 56 06/23/2021    LABVLDL 22 06/23/2021     TSH:    Lab Results   Component Value Date    TSH 2.740 06/23/2021          Assessment and Plan:  Trell Ferrell was seen today for medication refill. Diagnoses and all orders for this visit:    Essential hypertension  -     CBC Auto Differential; Future  -     Comprehensive Metabolic Panel; Future  -     TSH without Reflex; Future  -     Vitamin B12 & Folate; Future  -     Urinalysis; Future  -     Uric Acid; Future  Well controlled. Labs reviewed in detail. Repeat in 6 months. Coronary artery disease involving native coronary artery of native heart without angina pectoris  Stable    Mixed hyperlipidemia  -     Lipid Panel; Future    Chronic renal impairment, stage 3b (HCC)  Stable    OAB (overactive bladder)  -     mirabegron (MYRBETRIQ) 25 MG TB24; Take 1 tablet by mouth daily  Will try Myrbetriq instead to decrease bladder spasm. Patient does not want to have Botox injections any further    Primary osteoarthritis involving multiple joints  -     HYDROcodone-acetaminophen (NORCO) 7.5-325 MG per tablet; Take 1 tablet by mouth every 12 hours as needed for Pain for up to 30 days. Intended supply: 30 days  -     HYDROcodone-acetaminophen (NORCO) 7.5-325 MG per tablet;  Take 1 tablet by mouth every 12 hours as needed for Pain for up to 30 days. Intended supply: 30 days  -     HYDROcodone-acetaminophen (NORCO) 7.5-325 MG per tablet; Take 1 tablet by mouth every 12 hours as needed for Pain for up to 30 days. Intended supply: 30 days  -     gabapentin (NEURONTIN) 100 MG capsule; Take 1 capsule by mouth 3 times daily for 180 days. 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 Morbidly obese (HCC)    Impaired fasting glucose  -     Hemoglobin A1C; Future    Vitamin D insufficiency  -     Vitamin D 25 Hydroxy; Future          Return in about 3 months (around 9/29/2021) for Chronic pain medication refills.       Seen By:  Michelet Bender, DO

## 2021-09-13 ENCOUNTER — PROCEDURE VISIT (OUTPATIENT)
Dept: PODIATRY | Age: 86
End: 2021-09-13
Payer: MEDICARE

## 2021-09-13 VITALS
SYSTOLIC BLOOD PRESSURE: 130 MMHG | WEIGHT: 183 LBS | BODY MASS INDEX: 36.96 KG/M2 | TEMPERATURE: 97.2 F | DIASTOLIC BLOOD PRESSURE: 72 MMHG

## 2021-09-13 DIAGNOSIS — I73.9 PERIPHERAL VASCULAR DISEASE, UNSPECIFIED (HCC): ICD-10-CM

## 2021-09-13 DIAGNOSIS — M15.9 PRIMARY OSTEOARTHRITIS INVOLVING MULTIPLE JOINTS: ICD-10-CM

## 2021-09-13 DIAGNOSIS — M79.675 PAIN IN LEFT TOE(S): ICD-10-CM

## 2021-09-13 DIAGNOSIS — B35.1 TINEA UNGUIUM: ICD-10-CM

## 2021-09-13 DIAGNOSIS — M79.674 PAIN IN TOE OF RIGHT FOOT: ICD-10-CM

## 2021-09-13 DIAGNOSIS — G62.9 NEUROPATHY: Primary | ICD-10-CM

## 2021-09-13 DIAGNOSIS — R26.2 DIFFICULTY WALKING: ICD-10-CM

## 2021-09-13 PROCEDURE — G8427 DOCREV CUR MEDS BY ELIG CLIN: HCPCS | Performed by: PODIATRIST

## 2021-09-13 PROCEDURE — 4040F PNEUMOC VAC/ADMIN/RCVD: CPT | Performed by: PODIATRIST

## 2021-09-13 PROCEDURE — 1090F PRES/ABSN URINE INCON ASSESS: CPT | Performed by: PODIATRIST

## 2021-09-13 PROCEDURE — 1036F TOBACCO NON-USER: CPT | Performed by: PODIATRIST

## 2021-09-13 PROCEDURE — 99213 OFFICE O/P EST LOW 20 MIN: CPT | Performed by: PODIATRIST

## 2021-09-13 PROCEDURE — 11721 DEBRIDE NAIL 6 OR MORE: CPT | Performed by: PODIATRIST

## 2021-09-13 PROCEDURE — 1123F ACP DISCUSS/DSCN MKR DOCD: CPT | Performed by: PODIATRIST

## 2021-09-13 PROCEDURE — G8417 CALC BMI ABV UP PARAM F/U: HCPCS | Performed by: PODIATRIST

## 2021-09-13 RX ORDER — GABAPENTIN 100 MG/1
100 CAPSULE ORAL 3 TIMES DAILY
Qty: 270 CAPSULE | Refills: 1 | Status: SHIPPED
Start: 2021-09-13 | End: 2022-01-07 | Stop reason: SDUPTHER

## 2021-09-13 ASSESSMENT — ENCOUNTER SYMPTOMS
GASTROINTESTINAL NEGATIVE: 1
EYES NEGATIVE: 1
RESPIRATORY NEGATIVE: 1
BACK PAIN: 1

## 2021-09-13 NOTE — PROGRESS NOTES
[facility]  Return Patient    Chief Complaint   Patient presents with    Toe Pain     saw pcp Dr. Jonatan Mora 6/29/21       Subjective: This Prince Arya comes to clinic for foot and nail care. Pt currently has complaint of thickened, painful, elongated nails that he/she cannot manage by themselves. Pt. Relates pain to nails with shoe gear. Pt's primary care physician is Jonatan Mora DO. Pt having  Pain numbness  Burning  both feet   No trauma  On gabapentin 100 tid   Past Medical History:   Diagnosis Date    Anxiety with depression     Arrhythmia     PAF    CAD (coronary artery disease)     Chronic kidney disease     stage 3    Chronic pain syndrome     Colon cancer ([facility] [address]     History of CVA (cerebrovascular accident) without residual deficits     Hyperlipidemia     Hypertension     Osteoarthritis     Shingles     Sleep apnea     TIA (transient ischemic attack)        Allergies   Allergen Reactions    Bactrim [Sulfamethoxazole-Trimethoprim]     Lyrica [Pregabalin]     Prednisone     Sulfa Antibiotics Rash     Current Outpatient Medications on File Prior to Visit   Medication Sig Dispense Refill    HYDROcodone-acetaminophen (NORCO) 7.5-325 MG per tablet Take 1 tablet by mouth every 12 hours as needed for Pain.  HYDROcodone-acetaminophen (NORCO) 7.5-325 MG per tablet Take 1 tablet by mouth every 12 hours as needed for Pain for up to 30 days. Intended supply: 30 days 60 tablet 0    donepezil (ARICEPT) 5 MG tablet TAKE ONE TABLET BY MOUTH DAILY WITH BREAKFAST.  90 tablet 1    mirabegron (MYRBETRIQ) 25 MG TB24 Take 1 tablet by mouth daily 30 tablet 5    metoprolol succinate (TOPROL XL) 25 MG extended release tablet Take 0.5 tablets by mouth nightly TAKE 0.5 MG DAILY 90 tablet 1    clopidogrel (PLAVIX) 75 MG tablet TAKE ONE TABLET BY MOUTH DAILY 90 tablet 1    pantoprazole (PROTONIX) 40 MG tablet TAKE ONE TABLET BY MOUTH DAILY 90 tablet 1    rosuvastatin (CRESTOR) 20 MG tablet Take 1 tablet by mouth daily 90 tablet 3    aspirin 325 MG tablet Take 325 mg by mouth every morning (before breakfast)      sertraline (ZOLOFT) 25 MG tablet Take 1 tablet by mouth daily 1/2 daily 90 tablet 1    diclofenac sodium (VOLTAREN) 1 % GEL Apply 4 g topically 4 times daily as needed for Pain 100 g 1     No current facility-administered medications on file prior to visit. Review of Systems   Constitutional: Negative. HENT: Negative. Eyes: Negative. Respiratory: Negative. Cardiovascular: Negative. Gastrointestinal: Negative. Endocrine: Negative. Genitourinary: Negative. Musculoskeletal: Positive for arthralgias, back pain, gait problem and joint swelling. Objective:  General: AAO x 3 in NAD. Derm  Toenail Description  Sites of Onychomycosis Involvement (Check affected area)  [x] [x] [x] [x] [x] [x] [x] [x] [x] [x]  5 4 3 2 1 1 2 3 4 5                          Right                                        Left    Thickness  [x] [x] [x] [x] [x] [x] [x] [x] [x] [x]  5 4 3 2 1 1 2 3 4 5                         Right                                        Left    Dystrophic Changes   [x] [x] [x] [x] [x] [x] [x] [x] [x] [x]  5 4 3 2 1 1 2 3 4 5                         Right                                        Left    Color  [x] [x] [x] [x] [x] [x] [x] [x] [x] [x]  5 4 3 2 1 1 2 3 4 5                          Right                                        Left    Incurvation/Ingrowin   [x] [x] [x] [x] [x] [x] [x] [x] [x] [x]  5 4 3 2 1 1 2 3 4 5                         Right                                        Left    Inflammation/Pain   [x] [x] [x] [x] [x] [x] [x] [x] [x] [x]  5 4 3  2 1 1 2 3 4 5                         Right                                        Left      Nails that are described above are all elongated thickened pitting mycotic yellowish incurvated causing pain with both shoe gear.  Palpation1-5  Bilateral     Dermatologic Exam:hair loss noted lower extremity    Skin lesion/ulceration   Skin   Callus neg  Musculoskeletal:     1st MPJ ROM normal, Bilateral.  Muscle strength 5/5, Bilateral.  Pain present upon palpation of toenails 1-5  Bilateral., Bilateral.  Ankle ROM normal,Bilateral.    Dorsally contracted digits, Bilateral.     Vascular:  Pulses   bilateral DP absent    PT absent    Neurological:  Sensation present to light touch to level of digits, Bilateral.    Foot Exam    General  General Appearance: appears stated age and healthy   Orientation: alert and oriented to person, place, and time   Assistance: walker use and wheelchair use       Right Foot/Ankle     Inspection and Palpation  Tenderness: great toe metatarsophalangeal joint and metatarsals   Hallux valgus: yes  Hallux limitus: yes  Skin Exam: abnormal color; Neurovascular  Dorsalis pedis: absent  Posterior tibial: absent  Saphenous nerve sensation: diminished  Tibial nerve sensation: diminished  Superficial peroneal nerve sensation: diminished  Deep peroneal nerve sensation: diminished  Sural nerve sensation: diminished      Left Foot/Ankle      Inspection and Palpation  Tenderness: metatarsals   Hallux valgus: yes  Skin Exam: abnormal color; Neurovascular  Dorsalis pedis: absent  Posterior tibial: absent  Saphenous nerve sensation: diminished  Tibial nerve sensation: diminished  Superficial peroneal nerve sensation: diminished  Deep peroneal nerve sensation: diminished  Sural nerve sensation: diminished             Ortho Exam  Q7   []Yes    []No                Q8   [x]Yes    []No                     Q9   []Yes    []No  Assessment:  80 y.o. female with:   1. Neuropathy    2. Tinea unguium    3. Peripheral vascular disease, unspecified ([facility] [address]    4. Pain in toe of right foot    5. Pain in left toe(s)    6. Difficulty walking    7. Primary osteoarthritis involving multiple joints     No orders of the defined types were placed in this encounter.         Plan:  Pt seen for  chronic pain oarrs report reviewed I have reviewed current medication  and prior noted I believe the need for refill is warranted  I have reviewed oarrs report and found no drug seeking behavior   I have discussed all side effects and narcotic policy   with pt  pt understands and agrees with treatment  a follow up  appointment is made    Pt was evaluated and examined. Patient was given personalized discharge instructions. Nails 1-10 were debrided in length and thickness sharply with a nail nipper and  without incident. Pt will follow up in 9 weeks or sooner if any problems arise. Diagnosis was discussed with the pt and all of their questions were answered in detail. Proper foot hygiene and care was discussed with the pt. Patient to check feet daily and contact the office with any questions/problems/concerns. Other comorbidity noted and will be managed by PCP. Pain waiver discussed with patient and confirmed.  Refilled gabapentin   9/13/2021      Electronically signed by Cassi Noyola DPM on 9/13/2021 at 3:07 PM  9/13/2021

## 2021-09-28 ENCOUNTER — OFFICE VISIT (OUTPATIENT)
Dept: PRIMARY CARE CLINIC | Age: 86
End: 2021-09-28
Payer: MEDICARE

## 2021-09-28 VITALS
TEMPERATURE: 97.2 F | HEIGHT: 59 IN | DIASTOLIC BLOOD PRESSURE: 60 MMHG | BODY MASS INDEX: 37.09 KG/M2 | HEART RATE: 55 BPM | WEIGHT: 184 LBS | SYSTOLIC BLOOD PRESSURE: 122 MMHG | OXYGEN SATURATION: 95 %

## 2021-09-28 DIAGNOSIS — N32.81 OAB (OVERACTIVE BLADDER): ICD-10-CM

## 2021-09-28 DIAGNOSIS — Z23 NEED FOR INFLUENZA VACCINATION: ICD-10-CM

## 2021-09-28 DIAGNOSIS — K21.9 GASTROESOPHAGEAL REFLUX DISEASE WITHOUT ESOPHAGITIS: ICD-10-CM

## 2021-09-28 DIAGNOSIS — F41.1 GENERALIZED ANXIETY DISORDER: ICD-10-CM

## 2021-09-28 DIAGNOSIS — Z86.73 HISTORY OF STROKE WITHOUT RESIDUAL DEFICITS: ICD-10-CM

## 2021-09-28 DIAGNOSIS — M15.9 PRIMARY OSTEOARTHRITIS INVOLVING MULTIPLE JOINTS: Primary | ICD-10-CM

## 2021-09-28 PROCEDURE — 99214 OFFICE O/P EST MOD 30 MIN: CPT | Performed by: FAMILY MEDICINE

## 2021-09-28 PROCEDURE — 1090F PRES/ABSN URINE INCON ASSESS: CPT | Performed by: FAMILY MEDICINE

## 2021-09-28 PROCEDURE — 90694 VACC AIIV4 NO PRSRV 0.5ML IM: CPT | Performed by: FAMILY MEDICINE

## 2021-09-28 PROCEDURE — 4040F PNEUMOC VAC/ADMIN/RCVD: CPT | Performed by: FAMILY MEDICINE

## 2021-09-28 PROCEDURE — 1036F TOBACCO NON-USER: CPT | Performed by: FAMILY MEDICINE

## 2021-09-28 PROCEDURE — 1123F ACP DISCUSS/DSCN MKR DOCD: CPT | Performed by: FAMILY MEDICINE

## 2021-09-28 PROCEDURE — G8417 CALC BMI ABV UP PARAM F/U: HCPCS | Performed by: FAMILY MEDICINE

## 2021-09-28 PROCEDURE — G8427 DOCREV CUR MEDS BY ELIG CLIN: HCPCS | Performed by: FAMILY MEDICINE

## 2021-09-28 PROCEDURE — G0008 ADMIN INFLUENZA VIRUS VAC: HCPCS | Performed by: FAMILY MEDICINE

## 2021-09-28 RX ORDER — METOPROLOL SUCCINATE 25 MG/1
12.5 TABLET, EXTENDED RELEASE ORAL NIGHTLY
Qty: 90 TABLET | Refills: 1 | Status: SHIPPED
Start: 2021-09-28 | End: 2022-01-07 | Stop reason: SDUPTHER

## 2021-09-28 RX ORDER — HYDROCODONE BITARTRATE AND ACETAMINOPHEN 7.5; 325 MG/1; MG/1
1 TABLET ORAL EVERY 12 HOURS PRN
Qty: 60 TABLET | Refills: 0 | Status: SHIPPED
Start: 2021-10-26 | End: 2021-11-25

## 2021-09-28 RX ORDER — OXYBUTYNIN CHLORIDE 10 MG/1
TABLET, EXTENDED RELEASE ORAL
COMMUNITY
Start: 2021-06-30 | End: 2021-09-28 | Stop reason: ALTCHOICE

## 2021-09-28 RX ORDER — CLOPIDOGREL BISULFATE 75 MG/1
TABLET ORAL
Qty: 90 TABLET | Refills: 1 | Status: SHIPPED
Start: 2021-09-28 | End: 2022-01-07 | Stop reason: SDUPTHER

## 2021-09-28 RX ORDER — HYDROCODONE BITARTRATE AND ACETAMINOPHEN 7.5; 325 MG/1; MG/1
1 TABLET ORAL EVERY 12 HOURS PRN
Qty: 60 TABLET | Refills: 0 | Status: SHIPPED
Start: 2021-11-23 | End: 2021-12-23

## 2021-09-28 RX ORDER — PANTOPRAZOLE SODIUM 40 MG/1
TABLET, DELAYED RELEASE ORAL
Qty: 90 TABLET | Refills: 1 | Status: SHIPPED
Start: 2021-09-28 | End: 2022-01-07 | Stop reason: SDUPTHER

## 2021-09-28 RX ORDER — HYDROCODONE BITARTRATE AND ACETAMINOPHEN 7.5; 325 MG/1; MG/1
1 TABLET ORAL EVERY 12 HOURS PRN
Qty: 60 TABLET | Refills: 0 | Status: SHIPPED
Start: 2021-09-28 | End: 2022-01-07 | Stop reason: SDUPTHER

## 2021-09-28 ASSESSMENT — ENCOUNTER SYMPTOMS
CONSTIPATION: 0
ABDOMINAL PAIN: 0
WHEEZING: 0
VOMITING: 0
COUGH: 0
BACK PAIN: 1
NAUSEA: 0
SHORTNESS OF BREATH: 0
DIARRHEA: 0

## 2021-09-28 NOTE — PROGRESS NOTES
21  Moisés Velasquez : 1929 Sex: female  Age: 80 y.o. Chief Complaint   Patient presents with    Medication Refill     HPI:  80 y.o. female presents today for 3 month follow up of chronic medical conditions, medication refills and FBW . Patient's chart, medical, surgical and medication history all reviewed. Chronic Pain  Patient has long standing history of chronic back pain with numbness and tingling in her feet. Has had long standing lumbar disc disease with past laminectomy. She has been on Norco for many years. Compliant with follow ups. Noting that she is having more pain in the lateral hips and into her legs. Does not walk much anymore- mostly sedentary. States that the neuropathy in her feet is the worst part. Coronary Artery Disease   Patient presents for routine coronary artery disease follow-up. Cardiac risk factors include advanced age (older than 54 for men, 72 for women), dyslipidemia, hypertension, obesity (BMI >= 30 kg/m2) and sedentary lifestyle. Patient has a history of stent placement years ago. She is  on ASA/Statin/BB/ACEi. Incontinence  Patient also has a long standing history of incontinence. Previously received Botox injections that were very helpful. Since they've worn off, patient having recurrent issues. Does not want to have injections again. Tried Oxybutynin, but didn't notice a difference. Myrbetriq is doing well controlling frequency of urination. ROS:  Review of Systems   Constitutional: Negative for chills, fatigue and fever. Respiratory: Negative for cough, shortness of breath and wheezing. Cardiovascular: Negative for chest pain and palpitations. Gastrointestinal: Negative for abdominal pain, constipation, diarrhea, nausea and vomiting. Genitourinary: Positive for frequency (incontinence). Musculoskeletal: Positive for arthralgias, back pain and gait problem. Skin: Negative for rash.    Neurological: Positive for weakness and numbness. Negative for dizziness and headaches. Psychiatric/Behavioral: Negative for dysphoric mood. The patient is not nervous/anxious. All other systems reviewed and are negative. Current Outpatient Medications on File Prior to Visit   Medication Sig Dispense Refill    gabapentin (NEURONTIN) 100 MG capsule Take 1 capsule by mouth 3 times daily for 180 days. 270 capsule 1    HYDROcodone-acetaminophen (NORCO) 7.5-325 MG per tablet Take 1 tablet by mouth every 12 hours as needed for Pain.  donepezil (ARICEPT) 5 MG tablet TAKE ONE TABLET BY MOUTH DAILY WITH BREAKFAST. 90 tablet 1    rosuvastatin (CRESTOR) 20 MG tablet Take 1 tablet by mouth daily 90 tablet 3    aspirin 325 MG tablet Take 325 mg by mouth every morning (before breakfast)      sertraline (ZOLOFT) 25 MG tablet Take 1 tablet by mouth daily 1/2 daily 90 tablet 1    mirabegron (MYRBETRIQ) 25 MG TB24 Take 1 tablet by mouth daily 30 tablet 5     No current facility-administered medications on file prior to visit.        Allergies   Allergen Reactions    Bactrim [Sulfamethoxazole-Trimethoprim]     Lyrica [Pregabalin]     Prednisone     Sulfa Antibiotics Rash       Past Medical History:   Diagnosis Date    Anxiety with depression     Arrhythmia     PAF    CAD (coronary artery disease)     Chronic kidney disease     stage 3    Chronic pain syndrome     Colon cancer (HCC)     History of CVA (cerebrovascular accident) without residual deficits     Hyperlipidemia     Hypertension     Osteoarthritis     Shingles     Sleep apnea     TIA (transient ischemic attack)      Past Surgical History:   Procedure Laterality Date    BARTHOLIN GLAND CYST EXCISION      CARPAL TUNNEL RELEASE      CATARACT REMOVAL      COLECTOMY      with colostomy    DIAGNOSTIC CARDIAC CATH LAB PROCEDURE      drug eluting stent proximal RCA and diagonal branch    HYSTERECTOMY      LUMBAR LAMINECTOMY       Family History   Problem Relation Age of Onset    Heart Disease Mother         age 80    Colon Cancer Father         age 80    No Known Problems Brother     Cancer Brother         liver     Social History     Socioeconomic History    Marital status:      Spouse name: Not on file    Number of children: Not on file    Years of education: Not on file    Highest education level: Not on file   Occupational History    Occupation: retired various jobs/sales   Tobacco Use    Smoking status: Never Smoker    Smokeless tobacco: Never Used   Vaping Use    Vaping Use: Never used   Substance and Sexual Activity    Alcohol use: Never    Drug use: Never    Sexual activity: Not Currently     Partners: Male     Birth control/protection: Post-menopausal   Other Topics Concern    Not on file   Social History Narrative    Not on file     Social Determinants of Health     Financial Resource Strain:     Difficulty of Paying Living Expenses:    Food Insecurity:     Worried About [address] in the Last Year:     [address] in the Last Year:    Transportation Needs:     Lack of Transportation (Medical):  Lack of Transportation (Non-Medical):    Physical Activity:     Days of Exercise per Week:     Minutes of Exercise per Session:    Stress:     Feeling of Stress :    Social Connections:     Frequency of Communication with Friends and Family:     Frequency of Social Gatherings with Friends and Family:     Attends Mor[employer] Services:     Active Member of Clubs or Organizations:     Attends Club or Organization Meetings:     Marital Status:    Intimate Partner Violence:     Fear of Current or Ex-Partner:     Emotionally Abused:     Physically Abused:     Sexually Abused:        Vitals:    09/28/21 1423   BP: 122/60   Pulse: 55   Temp: 97.2 °F (36.2 °C)   SpO2: 95%   Weight: 184 lb (83.5 kg)   Height: 4' 11\" (1.499 m)       Physical Exam:  Physical Exam  Vitals and nursing note reviewed.    Constitutional:       General: She is not in acute distress. Appearance: Normal appearance. She is well-developed. She is obese. She is not ill-appearing. HENT:      Head: Normocephalic and atraumatic. Right Ear: Hearing and external ear normal.      Left Ear: Hearing and external ear normal.      Nose:      Comments: Wearing mask  Eyes:      General: Lids are normal. No scleral icterus. Extraocular Movements: Extraocular movements intact. Conjunctiva/sclera: Conjunctivae normal.   Neck:      Thyroid: No thyromegaly. Cardiovascular:      Rate and Rhythm: Normal rate and regular rhythm. Heart sounds: Normal heart sounds. No murmur heard. Pulmonary:      Effort: Pulmonary effort is normal. No respiratory distress. Breath sounds: Normal breath sounds. No wheezing. Musculoskeletal:         General: No tenderness or deformity. Cervical back: Normal range of motion and neck supple. Right lower leg: No edema. Left lower leg: No edema. Lymphadenopathy:      Cervical: No cervical adenopathy. Skin:     General: Skin is warm and dry. Findings: No rash. Neurological:      General: No focal deficit present. Mental Status: She is alert and oriented to person, place, and time. Gait: Gait abnormal (patient seated in wheelchair). Psychiatric:         Mood and Affect: Mood and affect normal.         Speech: Speech normal.         Behavior: Behavior normal.         Thought Content:  Thought content normal.         Labs:  CBC with Differential:    Lab Results   Component Value Date    WBC 5.5 06/23/2021    RBC 3.96 06/23/2021    HGB 11.7 06/23/2021    HCT 38.4 06/23/2021    PLT 57 06/23/2021    MCV 97.0 06/23/2021    MCH 29.5 06/23/2021    MCHC 30.5 06/23/2021    RDW 14.1 06/23/2021    LYMPHOPCT 19.9 06/23/2021    MONOPCT 10.1 06/23/2021    BASOPCT 0.5 06/23/2021    MONOSABS 0.56 06/23/2021    LYMPHSABS 1.10 06/23/2021    EOSABS 0.09 06/23/2021    BASOSABS 0.03 06/23/2021     CMP:    Lab Results   Component Value Date     06/23/2021    K 4.1 06/23/2021     06/23/2021    CO2 28 06/23/2021    BUN 18 06/23/2021    CREATININE 1.3 06/23/2021    CREATININE 1.3 12/14/2018    GFRAA 46 06/23/2021    LABGLOM 38 06/23/2021    GLUCOSE 92 06/23/2021    PROT 6.8 06/23/2021    LABALBU 3.9 06/23/2021    CALCIUM 9.1 06/23/2021    BILITOT 0.4 06/23/2021    ALKPHOS 58 06/23/2021    AST 15 06/23/2021    ALT 7 06/23/2021     Uric Acid:  No results found for: Ardine Hedger  Microalbumen/Creatinine ratio:  No components found for: RUCREAT  FLP:    Lab Results   Component Value Date    TRIG 109 06/23/2021    HDL 48 06/23/2021    LDLCALC 56 06/23/2021    LABVLDL 22 06/23/2021     TSH:    Lab Results   Component Value Date    TSH 2.740 06/23/2021          Assessment and Plan:  Vin Zhang was seen today for medication refill. Diagnoses and all orders for this visit:    Primary osteoarthritis involving multiple joints  -     HYDROcodone-acetaminophen (NORCO) 7.5-325 MG per tablet; Take 1 tablet by mouth every 12 hours as needed for Pain for up to 30 days. Intended supply: 30 days  -     HYDROcodone-acetaminophen (NORCO) 7.5-325 MG per tablet; Take 1 tablet by mouth every 12 hours as needed for Pain for up to 30 days. Intended supply: 30 days  -     HYDROcodone-acetaminophen (NORCO) 7.5-325 MG per tablet; Take 1 tablet by mouth every 12 hours as needed for Pain for up to 30 days. Intended supply: 30 days  OARRS reviewed and is appropriate    OAB (overactive bladder)  Doing well on Myrbetriq. Would like to stick with 25 mg dose for now    Gastroesophageal reflux disease without esophagitis  -     pantoprazole (PROTONIX) 40 MG tablet; TAKE ONE TABLET BY MOUTH DAILY    Generalized anxiety disorder  Discussed increasing Zoloft. They will try 50 mg at home and see if beneficial    History of stroke without residual deficits  -     metoprolol succinate (TOPROL XL) 25 MG extended release tablet;  Take 0.5 tablets by mouth nightly TAKE 0.5 MG DAILY  -     clopidogrel (PLAVIX) 75 MG tablet; TAKE ONE TABLET BY MOUTH DAILY    Need for influenza vaccination  -     INFLUENZA, QUADV, ADJUVANTED, 65 YRS =, IM, PF, PREFILL SYR, 0.5ML (FLUAD)          Return in about 3 months (around 12/28/2021), or if symptoms worsen or fail to improve, for Chronic pain medication refills.       Seen By:  Sadaf Haynes,

## 2021-09-29 DIAGNOSIS — N32.81 OAB (OVERACTIVE BLADDER): ICD-10-CM

## 2021-09-29 NOTE — TELEPHONE ENCOUNTER
No, we discussed this yesterday. She is taking Myrbetriq instead of oxybutynin.   Or at least she should be

## 2021-09-29 NOTE — TELEPHONE ENCOUNTER
The pt's daughter is calling to refill the pt's oxybutynin 10 mg but it is crossed off of her medication list. Should the pt still be taking it and if so can you please send a refill over to the pharmacy for her

## 2021-12-01 ENCOUNTER — NURSE TRIAGE (OUTPATIENT)
Dept: OTHER | Facility: CLINIC | Age: 86
End: 2021-12-01

## 2021-12-01 ENCOUNTER — OFFICE VISIT (OUTPATIENT)
Dept: FAMILY MEDICINE CLINIC | Age: 86
End: 2021-12-01
Payer: MEDICARE

## 2021-12-01 VITALS
RESPIRATION RATE: 18 BRPM | DIASTOLIC BLOOD PRESSURE: 70 MMHG | BODY MASS INDEX: 37.09 KG/M2 | HEART RATE: 67 BPM | SYSTOLIC BLOOD PRESSURE: 124 MMHG | WEIGHT: 184 LBS | OXYGEN SATURATION: 94 % | TEMPERATURE: 98.4 F | HEIGHT: 59 IN

## 2021-12-01 DIAGNOSIS — J18.9 PNEUMONIA DUE TO INFECTIOUS ORGANISM, UNSPECIFIED LATERALITY, UNSPECIFIED PART OF LUNG: Primary | ICD-10-CM

## 2021-12-01 DIAGNOSIS — R09.02 HYPOXIA: ICD-10-CM

## 2021-12-01 PROCEDURE — G8484 FLU IMMUNIZE NO ADMIN: HCPCS | Performed by: FAMILY MEDICINE

## 2021-12-01 PROCEDURE — G8427 DOCREV CUR MEDS BY ELIG CLIN: HCPCS | Performed by: FAMILY MEDICINE

## 2021-12-01 PROCEDURE — 99213 OFFICE O/P EST LOW 20 MIN: CPT | Performed by: FAMILY MEDICINE

## 2021-12-01 PROCEDURE — 1090F PRES/ABSN URINE INCON ASSESS: CPT | Performed by: FAMILY MEDICINE

## 2021-12-01 PROCEDURE — 4040F PNEUMOC VAC/ADMIN/RCVD: CPT | Performed by: FAMILY MEDICINE

## 2021-12-01 PROCEDURE — 1036F TOBACCO NON-USER: CPT | Performed by: FAMILY MEDICINE

## 2021-12-01 PROCEDURE — 1123F ACP DISCUSS/DSCN MKR DOCD: CPT | Performed by: FAMILY MEDICINE

## 2021-12-01 PROCEDURE — G8417 CALC BMI ABV UP PARAM F/U: HCPCS | Performed by: FAMILY MEDICINE

## 2021-12-01 RX ORDER — ALBUTEROL SULFATE 90 UG/1
2 AEROSOL, METERED RESPIRATORY (INHALATION) 4 TIMES DAILY PRN
Qty: 54 G | Refills: 1 | Status: SHIPPED | OUTPATIENT
Start: 2021-12-01

## 2021-12-01 NOTE — PROGRESS NOTES
Juan Luis Chen (:  1929) is a 80 y.o. female,Established patient, here for evaluation of the following chief complaint(s):  Pneumonia (patient went to [facility] last night with complaints of not being able to breathe. . covid test was done had a negative result. . xray was done and showed left sided pneumonia . Leona Knox [facility] gave her doxycycline hyclate and prednisone. . patient is requesting possibly an inhaler to help her breathe better at night. . she had to sleep sitting up last night )         ASSESSMENT/PLAN:  1. Pneumonia due to infectious organism, unspecified laterality, unspecified part of lung  2. Hypoxia  Albuterol sent over. Patient advised to contact her PCP for further evaluation and treatment. Red flags discussed with patient and family member. If these occur she is to go directly back to the emergency department. No follow-ups on file. Subjective   SUBJECTIVE/OBJECTIVE:  HPI  Patient went through [facility] yesterday. Given doxy and prednisone. This morning oxygen dropped down to 92%. Supposedly they talked to her regular doctors nurse who told her to come through here and that they would send over everything to take care of her issues. No records available for review. Has only been on medication for less than 24 hours. According to the patient's family member she was diagnosed with left-sided pneumonia and discharged without any other medications or care. No current complaint of chest pain. No fevers or chills. Apparently Covid testing was negative. Review of Systems   Constitutional: Negative for chills and fever. HENT: Negative for congestion, hearing loss, nosebleeds and sore throat. Eyes: Negative for photophobia. Respiratory: Positive for shortness of breath and wheezing. Negative for cough. Cardiovascular: Negative for chest pain, palpitations and leg swelling.    Gastrointestinal: Negative for abdominal pain, blood in stool, constipation, diarrhea, nausea and vomiting. Endocrine: Negative for polydipsia. Genitourinary: Negative for dysuria, frequency, hematuria and urgency. Musculoskeletal: Negative for back pain and myalgias. Skin: Negative. Neurological: Negative for dizziness, tremors, weakness and headaches. Hematological: Does not bruise/bleed easily. Psychiatric/Behavioral: Negative for hallucinations and suicidal ideas. All other systems reviewed and are negative. Current Outpatient Medications:     albuterol sulfate HFA (VENTOLIN HFA) 108 (90 Base) MCG/ACT inhaler, Inhale 2 puffs into the lungs 4 times daily as needed for Wheezing, Disp: 54 g, Rfl: 1    metoprolol succinate (TOPROL XL) 25 MG extended release tablet, Take 0.5 tablets by mouth nightly TAKE 0.5 MG DAILY, Disp: 90 tablet, Rfl: 1    clopidogrel (PLAVIX) 75 MG tablet, TAKE ONE TABLET BY MOUTH DAILY, Disp: 90 tablet, Rfl: 1    pantoprazole (PROTONIX) 40 MG tablet, TAKE ONE TABLET BY MOUTH DAILY, Disp: 90 tablet, Rfl: 1    HYDROcodone-acetaminophen (NORCO) 7.5-325 MG per tablet, Take 1 tablet by mouth every 12 hours as needed for Pain for up to 30 days. Intended supply: 30 days, Disp: 60 tablet, Rfl: 0    gabapentin (NEURONTIN) 100 MG capsule, Take 1 capsule by mouth 3 times daily for 180 days. , Disp: 270 capsule, Rfl: 1    donepezil (ARICEPT) 5 MG tablet, TAKE ONE TABLET BY MOUTH DAILY WITH BREAKFAST., Disp: 90 tablet, Rfl: 1    rosuvastatin (CRESTOR) 20 MG tablet, Take 1 tablet by mouth daily, Disp: 90 tablet, Rfl: 3    aspirin 325 MG tablet, Take 325 mg by mouth every morning (before breakfast), Disp: , Rfl:     sertraline (ZOLOFT) 25 MG tablet, Take 1 tablet by mouth daily 1/2 daily, Disp: 90 tablet, Rfl: 1    mirabegron (MYRBETRIQ) 25 MG TB24, Take 1 tablet by mouth daily (Patient not taking: Reported on 12/1/2021), Disp: 30 tablet, Rfl: 5    HYDROcodone-acetaminophen (NORCO) 7.5-325 MG per tablet, Take 1 tablet by mouth every 12 hours as needed for Pain., Disp: , Rfl:    Patient Active Problem List   Diagnosis    CAD (coronary artery disease)    Mixed hyperlipidemia    Chronic pain syndrome    Chronic renal insufficiency, stage III (moderate) (HCC)    Essential hypertension    Generalized anxiety disorder    History of stroke without residual deficits    Osteoarthritis    Gastroesophageal reflux disease without esophagitis    Anxiety with depression    Early onset Alzheimer's disease with behavioral disturbance ([facility]    Morbidly obese ([facility]    Pneumonia due to infectious organism    Hypoxia     Past Medical History:   Diagnosis Date    Anxiety with depression     Arrhythmia     PAF    CAD (coronary artery disease)     Chronic kidney disease     stage 3    Chronic pain syndrome     Colon cancer ([facility]     History of CVA (cerebrovascular accident) without residual deficits     Hyperlipidemia     Hypertension     Osteoarthritis     Shingles     Sleep apnea     TIA (transient ischemic attack)      Past Surgical History:   Procedure Laterality Date    BARTHOLIN GLAND CYST EXCISION      CARPAL TUNNEL RELEASE      CATARACT REMOVAL      COLECTOMY      with colostomy    DIAGNOSTIC CARDIAC CATH LAB PROCEDURE      drug eluting stent proximal RCA and diagonal branch    HYSTERECTOMY      LUMBAR LAMINECTOMY       Social History     Socioeconomic History    Marital status:       Spouse name: Not on file    Number of children: Not on file    Years of education: Not on file    Highest education level: Not on file   Occupational History    Occupation: retired various jobs/sales   Tobacco Use    Smoking status: Never Smoker    Smokeless tobacco: Never Used   Vaping Use    Vaping Use: Never used   Substance and Sexual Activity    Alcohol use: Never    Drug use: Never    Sexual activity: Not Currently     Partners: Male     Birth control/protection: Post-menopausal   Other Topics Concern    Not on file   Social History Narrative    Not on file     Social Determinants of Health     Financial Resource Strain:     Difficulty of Paying Living Expenses: Not on file   Food Insecurity:     Worried About Running Out of Food in the Last Year: Not on file    James of Food in the Last Year: Not on file   Transportation Needs:     Lack of Transportation (Medical): Not on file    Lack of Transportation (Non-Medical): Not on file   Physical Activity:     Days of Exercise per Week: Not on file    Minutes of Exercise per Session: Not on file   Stress:     Feeling of Stress : Not on file   Social Connections:     Frequency of Communication with Friends and Family: Not on file    Frequency of Social Gatherings with Friends and Family: Not on file    Attends Gnosticism Services: Not on file    Active Member of [address] [place] [address] [employer] or Organizations: Not on file    Attends Club or Organization Meetings: Not on file    Marital Status: Not on file   Intimate Partner Violence:     Fear of Current or Ex-Partner: Not on file    Emotionally Abused: Not on file    Physically Abused: Not on file    Sexually Abused: Not on file   Housing Stability:     Unable to Pay for Housing in the Last Year: Not on file    Number of [place] in the Last Year: Not on file    Unstable Housing in the Last Year: Not on file     Family History   Problem Relation Age of Onset    Heart Disease Mother         age Pérez  1560    Colon Cancer Father         age 80    No Known Problems Brother     Cancer Brother         liver      There are no preventive care reminders to display for this patient. There are no preventive care reminders to display for this patient. There are no preventive care reminders to display for this patient. There are no preventive care reminders to display for this patient.    Health Maintenance   Topic Date Due    DTaP/Tdap/Td vaccine (1 - Tdap) 2022 (Originally 1948)    Shingles Vaccine (1 of 2) 2022 (Originally 1979)   [employer] COVID-19 Vaccine (1) 06/29/2022 (Originally 2/13/1941)    Annual Wellness Visit (AWV)  03/24/2022    Lipid screen  06/23/2022    Potassium monitoring  11/30/2022    Creatinine monitoring  11/30/2022    Flu vaccine  Completed    Pneumococcal 65+ years Vaccine  Completed    Hepatitis A vaccine  Aged Out    Hepatitis B vaccine  Aged Out    Hib vaccine  Aged Out    Meningococcal (ACWY) vaccine  Aged Out      There are no preventive care reminders to display for this patient. There are no preventive care reminders to display for this patient. /70   Pulse 67   Temp 98.4 °F (36.9 °C)   Resp 18   Ht 4' 11\" (1.499 m)   Wt 184 lb (83.5 kg)   SpO2 94%   BMI 37.16 kg/m²     Objective   Physical Exam  Vitals reviewed. Constitutional:       Appearance: She is well-developed. She is ill-appearing. HENT:      Head: Normocephalic and atraumatic. Right Ear: External ear normal.      Left Ear: External ear normal.      Nose: Nose normal.      Mouth/Throat:      Pharynx: No posterior oropharyngeal erythema. Eyes:      Conjunctiva/sclera: Conjunctivae normal.      Pupils: Pupils are equal, round, and reactive to light. Cardiovascular:      Rate and Rhythm: Normal rate and regular rhythm. Heart sounds: Normal heart sounds. Pulmonary:      Effort: Pulmonary effort is normal.      Breath sounds: Decreased breath sounds and wheezing present. Abdominal:      General: Bowel sounds are normal.      Palpations: Abdomen is soft. Musculoskeletal:         General: Normal range of motion. Cervical back: Normal range of motion and neck supple. Skin:     General: Skin is warm and dry. Findings: No erythema. Neurological:      Mental Status: She is alert and oriented to person, place, and time. Cranial Nerves: No cranial nerve deficit.    Psychiatric:         Behavior: Behavior normal.         Judgment: Judgment normal.                  An electronic signature was used to authenticate this note.     --Radha Rdz, DO

## 2021-12-01 NOTE — TELEPHONE ENCOUNTER
Received call from [place] at [facility] with [employer]. Brief description of triage: SOB    Triage indicates for patient to see PCP today within 4 hours and if no appt available pt advised to go to urgent care or ER. Care advice provided, patient verbalizes understanding; denies any other questions or concerns; instructed to call back for any new or worsening symptoms. Writer provided message to [facility] for appointment scheduling [place]. Received my message. Attention Provider: Thank you for allowing me to participate in the care of your patient. The patient was connected to triage in response to information provided to the ECC/PSC. Please do not respond through this encounter as the response is not directed to a shared pool. Reason for Disposition   MILD difficulty breathing (e.g., minimal/no SOB at rest, SOB with walking, pulse < 100) of new onset or worse than normal    Answer Assessment - Initial Assessment Questions  1. RESPIRATORY STATUS: \"Describe your breathing? \" (e.g., wheezing, shortness of breath, unable to speak, severe coughing)       SOB, pt can speak, pt is coughing, wheezing    2. ONSET: \"When did this breathing problem begin? \"       2:30 this morning coughing and SOB    3. PATTERN \"Does the difficult breathing come and go, or has it been constant since it started? \"       Coming and going when pt is exerting herself    4. SEVERITY: \"How bad is your breathing? \" (e.g., mild, moderate, severe)     - MILD: No SOB at rest, mild SOB with walking, speaks normally in sentences, can lay down, no retractions, pulse < 100.     - MODERATE: SOB at rest, SOB with minimal exertion and prefers to sit, cannot lie down flat, speaks in phrases, mild retractions, audible wheezing, pulse 100-120.     - SEVERE: Very SOB at rest, speaks in single words, struggling to breathe, sitting hunched forward, retractions, pulse > 120       mild    5.  RECURRENT SYMPTOM: \"Have you had difficulty breathing before? \" If so, ask: \"When was the last time? \" and \"What happened that time? \"       no    6. CARDIAC HISTORY: \"Do you have any history of heart disease? \" (e.g., heart attack, angina, bypass surgery, angioplasty)       5 stents    7. LUNG HISTORY: \"Do you have any history of lung disease? \"  (e.g., pulmonary embolus, asthma, emphysema)      denies    8. CAUSE: \"What do you think is causing the breathing problem? \"       Past hospitalization with pneumonia, pt is currently on antibiotics and steroids      9. OTHER SYMPTOMS: \"Do you have any other symptoms? (e.g., dizziness, runny nose, cough, chest pain, fever)      Cough, pain in ribs on L side    10. PREGNANCY: \"Is there any chance you are pregnant? \" \"When was your last menstrual period? \"        n/a    11. TRAVEL: \"Have you traveled out of the country in the last month? \" (e.g., travel history, exposures)        denies    Protocols used: BREATHING DIFFICULTY-ADULT-[place]

## 2021-12-02 ASSESSMENT — ENCOUNTER SYMPTOMS
COUGH: 0
NAUSEA: 0
WHEEZING: 1
CONSTIPATION: 0
DIARRHEA: 0
VOMITING: 0
ABDOMINAL PAIN: 0
SORE THROAT: 0
SHORTNESS OF BREATH: 1
BLOOD IN STOOL: 0
BACK PAIN: 0
PHOTOPHOBIA: 0

## 2021-12-03 ENCOUNTER — TELEPHONE (OUTPATIENT)
Dept: PRIMARY CARE CLINIC | Age: 86
End: 2021-12-03

## 2021-12-03 NOTE — TELEPHONE ENCOUNTER
----- Message from Kamari Andrea sent at 12/3/2021  3:26 PM EST -----  Subject: Message to Provider    QUESTIONS  Information for Provider? pt daughter Elizabeth Coronado called to see if they can   have an appointment at the same time. Meera's appointment is 12/7/21   @10:45. Pt also wanted to know if pneumonia is contagious   ---------------------------------------------------------------------------  --------------  CALL BACK INFO  What is the best way for the office to contact you? OK to leave message on   voicemail  Preferred Call Back Phone Number? [phone]  ---------------------------------------------------------------------------  --------------  SCRIPT ANSWERS  Relationship to Patient?  Self

## 2021-12-03 NOTE — TELEPHONE ENCOUNTER
What's going on with her mom?   It is not typically contagious- just avoid coughing without covering mouth and don't share utensils/drinks

## 2021-12-03 NOTE — TELEPHONE ENCOUNTER
pt daughter Yuly Irvin called to see if they can   have an appointment at the same time. Meera's appointment is 12/7/21   @10:45.  Pt also wanted to know if pneumonia is contagious

## 2021-12-20 ENCOUNTER — OFFICE VISIT (OUTPATIENT)
Dept: PRIMARY CARE CLINIC | Age: 86
End: 2021-12-20
Payer: MEDICARE

## 2021-12-20 VITALS
BODY MASS INDEX: 37.16 KG/M2 | HEART RATE: 74 BPM | TEMPERATURE: 97.4 F | OXYGEN SATURATION: 93 % | DIASTOLIC BLOOD PRESSURE: 62 MMHG | SYSTOLIC BLOOD PRESSURE: 100 MMHG | HEIGHT: 59 IN

## 2021-12-20 DIAGNOSIS — J18.9 PNEUMONIA OF LEFT LUNG DUE TO INFECTIOUS ORGANISM, UNSPECIFIED PART OF LUNG: Primary | ICD-10-CM

## 2021-12-20 DIAGNOSIS — D72.819 LEUKOPENIA, UNSPECIFIED TYPE: ICD-10-CM

## 2021-12-20 PROCEDURE — 1090F PRES/ABSN URINE INCON ASSESS: CPT | Performed by: FAMILY MEDICINE

## 2021-12-20 PROCEDURE — 4040F PNEUMOC VAC/ADMIN/RCVD: CPT | Performed by: FAMILY MEDICINE

## 2021-12-20 PROCEDURE — G8417 CALC BMI ABV UP PARAM F/U: HCPCS | Performed by: FAMILY MEDICINE

## 2021-12-20 PROCEDURE — G8427 DOCREV CUR MEDS BY ELIG CLIN: HCPCS | Performed by: FAMILY MEDICINE

## 2021-12-20 PROCEDURE — 1036F TOBACCO NON-USER: CPT | Performed by: FAMILY MEDICINE

## 2021-12-20 PROCEDURE — 1123F ACP DISCUSS/DSCN MKR DOCD: CPT | Performed by: FAMILY MEDICINE

## 2021-12-20 PROCEDURE — G8484 FLU IMMUNIZE NO ADMIN: HCPCS | Performed by: FAMILY MEDICINE

## 2021-12-20 PROCEDURE — 99214 OFFICE O/P EST MOD 30 MIN: CPT | Performed by: FAMILY MEDICINE

## 2021-12-20 ASSESSMENT — ENCOUNTER SYMPTOMS
NAUSEA: 0
CONSTIPATION: 0
VOMITING: 0
COUGH: 0
DIARRHEA: 0
ABDOMINAL PAIN: 0
WHEEZING: 0
BACK PAIN: 0
SHORTNESS OF BREATH: 0

## 2021-12-20 NOTE — PROGRESS NOTES
21  Saniya Sy : 1929 Sex: female  Age: 80 y.o. Chief Complaint   Patient presents with    Pneumonia     pt dx with pneumonia . she is feeling better but still very weak     HPI:  80 y.o. female presents today for ER follow up. Patient's chart, medical, surgical and medication history all reviewed. ER follow up  Date seen in the ER: 21  Symptoms: L chest pain, SOB, cough  Labs/Imaging: CXR- scattered bilateral opacification, likely chronic; CBC- leukopenia, CMP- stable from baseline, Trop- mild increase  Diagnosis: LLL Pneumonia   Treatments: Antibiotics, steroids and inhaler    Today, patient states that she is improving slowly. Still having fatigue, but able to get up and take care of herself as before. No coughing. Still with L rib pain. ROS:  Review of Systems   Constitutional: Positive for fatigue. Negative for chills and fever. Respiratory: Negative for cough, shortness of breath and wheezing. Cardiovascular: Positive for chest pain (L rib pain). Negative for palpitations. Gastrointestinal: Negative for abdominal pain, constipation, diarrhea, nausea and vomiting. Musculoskeletal: Negative for arthralgias and back pain. Skin: Negative for rash. Neurological: Negative for dizziness and headaches. Psychiatric/Behavioral: Negative for dysphoric mood. The patient is not nervous/anxious. All other systems reviewed and are negative.      Current Outpatient Medications on File Prior to Visit   Medication Sig Dispense Refill    rosuvastatin (CRESTOR) 20 MG tablet TAKE ONE TABLET BY MOUTH DAILY 90 tablet 1    albuterol sulfate HFA (VENTOLIN HFA) 108 (90 Base) MCG/ACT inhaler Inhale 2 puffs into the lungs 4 times daily as needed for Wheezing 54 g 1    mirabegron (MYRBETRIQ) 25 MG TB24 Take 1 tablet by mouth daily 30 tablet 5    metoprolol succinate (TOPROL XL) 25 MG extended release tablet Take 0.5 tablets by mouth nightly TAKE 0.5 MG DAILY 90 tablet 1    clopidogrel (PLAVIX) 75 MG tablet TAKE ONE TABLET BY MOUTH DAILY 90 tablet 1    pantoprazole (PROTONIX) 40 MG tablet TAKE ONE TABLET BY MOUTH DAILY 90 tablet 1    HYDROcodone-acetaminophen (NORCO) 7.5-325 MG per tablet Take 1 tablet by mouth every 12 hours as needed for Pain for up to 30 days. Intended supply: 30 days 60 tablet 0    gabapentin (NEURONTIN) 100 MG capsule Take 1 capsule by mouth 3 times daily for 180 days. 270 capsule 1    HYDROcodone-acetaminophen (NORCO) 7.5-325 MG per tablet Take 1 tablet by mouth every 12 hours as needed for Pain.  donepezil (ARICEPT) 5 MG tablet TAKE ONE TABLET BY MOUTH DAILY WITH BREAKFAST. 90 tablet 1    aspirin 325 MG tablet Take 325 mg by mouth every morning (before breakfast)      sertraline (ZOLOFT) 25 MG tablet Take 1 tablet by mouth daily 1/2 daily 90 tablet 1     No current facility-administered medications on file prior to visit.        Allergies   Allergen Reactions    Bactrim [Sulfamethoxazole-Trimethoprim]     Lyrica [Pregabalin]     Sulfa Antibiotics Rash       Past Medical History:   Diagnosis Date    Anxiety with depression     Arrhythmia     PAF    CAD (coronary artery disease)     Chronic kidney disease     stage 3    Chronic pain syndrome     Colon cancer (HCC)     History of CVA (cerebrovascular accident) without residual deficits     Hyperlipidemia     Hypertension     Osteoarthritis     Shingles     Sleep apnea     TIA (transient ischemic attack)      Past Surgical History:   Procedure Laterality Date    BARTHOLIN GLAND CYST EXCISION      CARPAL TUNNEL RELEASE      CATARACT REMOVAL      COLECTOMY      with colostomy    DIAGNOSTIC CARDIAC CATH LAB PROCEDURE      drug eluting stent proximal RCA and diagonal branch    HYSTERECTOMY      LUMBAR LAMINECTOMY       Family History   Problem Relation Age of Onset    Heart Disease Mother         age P[address]rez [address] 1560    Colon Cancer Father         age 80    No Known Problems Brother     on file       Vitals:    12/20/21 1618   BP: 100/62   Pulse: 74   Temp: 97.4 °F (36.3 °C)   SpO2: 93%   Weight: Comment: refused   Height: 4' 11\" (1.499 m)       Physical Exam:  Physical Exam  Vitals and nursing note reviewed. Constitutional:       General: She is not in acute distress. Appearance: Normal appearance. She is well-developed. She is obese. She is not ill-appearing. HENT:      Head: Normocephalic and atraumatic. Right Ear: Hearing and external ear normal.      Left Ear: Hearing and external ear normal.      Nose:      Comments: Wearing mask  Eyes:      General: Lids are normal. No scleral icterus. Extraocular Movements: Extraocular movements intact. Conjunctiva/sclera: Conjunctivae normal.   Neck:      Thyroid: No thyromegaly. Cardiovascular:      Rate and Rhythm: Normal rate and regular rhythm. Heart sounds: Normal heart sounds. No murmur heard. Pulmonary:      Effort: Pulmonary effort is normal. No respiratory distress. Breath sounds: Normal breath sounds. No wheezing. Musculoskeletal:         General: No tenderness or deformity. Cervical back: Normal range of motion and neck supple. Right lower leg: No edema. Left lower leg: No edema. Lymphadenopathy:      Cervical: No cervical adenopathy. Skin:     General: Skin is warm and dry. Findings: No rash. Neurological:      General: No focal deficit present. Mental Status: She is alert and oriented to person, place, and time. Gait: Gait abnormal (patient seated in wheelchair). Psychiatric:         Mood and Affect: Mood and affect normal.         Speech: Speech normal.         Behavior: Behavior normal.         Thought Content:  Thought content normal.         Labs:  CBC with Differential:    Lab Results   Component Value Date    WBC 3.3 11/30/2021    RBC 3.83 11/30/2021    HGB 11.4 11/30/2021    HCT 34.7 11/30/2021    PLT 54 11/30/2021    MCV 90.5 11/30/2021    MCH 29.8 11/30/2021 MCHC 32.9 11/30/2021    RDW 14.4 11/30/2021    SEGSPCT 84.6 11/30/2021    LYMPHOPCT 12.2 11/30/2021    MONOPCT 2.9 11/30/2021    BASOPCT 0.3 11/30/2021    MONOSABS 0.1 11/30/2021    LYMPHSABS 0.4 11/30/2021    EOSABS 0.0 11/30/2021    BASOSABS 0.0 11/30/2021     CMP:    Lab Results   Component Value Date     11/30/2021    K 4.5 11/30/2021     11/30/2021    CO2 25 11/30/2021    BUN 18 11/30/2021    CREATININE 1.1 11/30/2021    CREATININE 1.3 12/14/2018    GFRAA 46 06/23/2021    AGRATIO 0.9 11/30/2021    LABGLOM 46 11/30/2021    GLUCOSE 155 11/30/2021    PROT 6.9 11/30/2021    LABALBU 3.3 11/30/2021    CALCIUM 8.6 11/30/2021    BILITOT 0.6 11/30/2021    ALKPHOS 40 11/30/2021    AST 22 11/30/2021    ALT 13 11/30/2021     U/A:    Lab Results   Component Value Date    NITRITE negative 02/10/2020    COLORU Yellow 06/23/2021    PROTEINU TRACE 06/23/2021    PHUR 5.5 06/23/2021    WBCUA 5-10 06/23/2021    RBCUA 0-1 06/23/2021    BACTERIA RARE 06/23/2021    CLARITYU Clear 06/23/2021    SPECGRAV >=1.030 06/23/2021    LEUKOCYTESUR SMALL 06/23/2021    UROBILINOGEN 1.0 06/23/2021    BILIRUBINUR Negative 06/23/2021    BILIRUBINUR negative 02/10/2020    BLOODU Negative 06/23/2021    GLUCOSEU Negative 06/23/2021     HgBA1c:    Lab Results   Component Value Date    LABA1C 5.1 06/23/2021     FLP:    Lab Results   Component Value Date    TRIG 109 06/23/2021    HDL 48 06/23/2021    LDLCALC 56 06/23/2021    LABVLDL 22 06/23/2021     TSH:    Lab Results   Component Value Date    TSH 0.67 11/30/2021        Assessment and Plan:  Ashley Boo was seen today for pneumonia. Diagnoses and all orders for this visit:    Pneumonia of left lung due to infectious organism, unspecified part of lung    Leukopenia, unspecified type    Xray and labs reviewed. Discussed findings with daughter and patient. Will repeat CXR and labs. Daughter would like to wait until appointment in January.        Return in about 3 weeks (around 1/10/2022) for Recheck.       Seen By:  Gonzales Robison DO

## 2022-01-07 ENCOUNTER — PROCEDURE VISIT (OUTPATIENT)
Dept: PODIATRY | Age: 87
End: 2022-01-07
Payer: MEDICARE

## 2022-01-07 ENCOUNTER — OFFICE VISIT (OUTPATIENT)
Dept: FAMILY MEDICINE CLINIC | Age: 87
End: 2022-01-07
Payer: MEDICARE

## 2022-01-07 VITALS
SYSTOLIC BLOOD PRESSURE: 126 MMHG | TEMPERATURE: 97.3 F | BODY MASS INDEX: 36.36 KG/M2 | WEIGHT: 180 LBS | DIASTOLIC BLOOD PRESSURE: 74 MMHG

## 2022-01-07 VITALS
HEIGHT: 59 IN | TEMPERATURE: 97.3 F | WEIGHT: 180 LBS | RESPIRATION RATE: 19 BRPM | HEART RATE: 75 BPM | DIASTOLIC BLOOD PRESSURE: 74 MMHG | SYSTOLIC BLOOD PRESSURE: 126 MMHG | OXYGEN SATURATION: 98 % | BODY MASS INDEX: 36.29 KG/M2

## 2022-01-07 DIAGNOSIS — I10 ESSENTIAL HYPERTENSION: Primary | ICD-10-CM

## 2022-01-07 DIAGNOSIS — M15.9 PRIMARY OSTEOARTHRITIS INVOLVING MULTIPLE JOINTS: ICD-10-CM

## 2022-01-07 DIAGNOSIS — I10 ESSENTIAL HYPERTENSION: ICD-10-CM

## 2022-01-07 DIAGNOSIS — K21.9 GASTROESOPHAGEAL REFLUX DISEASE WITHOUT ESOPHAGITIS: ICD-10-CM

## 2022-01-07 DIAGNOSIS — G30.0 EARLY ONSET ALZHEIMER'S DISEASE WITH BEHAVIORAL DISTURBANCE (HCC): ICD-10-CM

## 2022-01-07 DIAGNOSIS — F02.818 EARLY ONSET ALZHEIMER'S DISEASE WITH BEHAVIORAL DISTURBANCE (HCC): ICD-10-CM

## 2022-01-07 DIAGNOSIS — E66.01 SEVERE OBESITY (BMI 35.0-35.9 WITH COMORBIDITY) (HCC): ICD-10-CM

## 2022-01-07 DIAGNOSIS — B35.1 TINEA UNGUIUM: Primary | ICD-10-CM

## 2022-01-07 DIAGNOSIS — E55.9 VITAMIN D INSUFFICIENCY: ICD-10-CM

## 2022-01-07 DIAGNOSIS — M79.674 PAIN IN TOE OF RIGHT FOOT: ICD-10-CM

## 2022-01-07 DIAGNOSIS — M79.675 PAIN IN LEFT TOE(S): ICD-10-CM

## 2022-01-07 DIAGNOSIS — E78.2 MIXED HYPERLIPIDEMIA: ICD-10-CM

## 2022-01-07 DIAGNOSIS — I73.9 PERIPHERAL VASCULAR DISEASE, UNSPECIFIED (HCC): ICD-10-CM

## 2022-01-07 DIAGNOSIS — F41.1 GENERALIZED ANXIETY DISORDER: ICD-10-CM

## 2022-01-07 DIAGNOSIS — R26.2 DIFFICULTY WALKING: ICD-10-CM

## 2022-01-07 DIAGNOSIS — R73.01 IMPAIRED FASTING GLUCOSE: ICD-10-CM

## 2022-01-07 DIAGNOSIS — Z86.73 HISTORY OF STROKE WITHOUT RESIDUAL DEFICITS: ICD-10-CM

## 2022-01-07 DIAGNOSIS — I25.10 CORONARY ARTERY DISEASE INVOLVING NATIVE CORONARY ARTERY OF NATIVE HEART WITHOUT ANGINA PECTORIS: ICD-10-CM

## 2022-01-07 DIAGNOSIS — N18.32 CHRONIC RENAL IMPAIRMENT, STAGE 3B (HCC): ICD-10-CM

## 2022-01-07 PROBLEM — J18.9 PNEUMONIA DUE TO INFECTIOUS ORGANISM: Status: RESOLVED | Noted: 2021-12-01 | Resolved: 2022-01-07

## 2022-01-07 PROBLEM — R09.02 HYPOXIA: Status: RESOLVED | Noted: 2021-12-01 | Resolved: 2022-01-07

## 2022-01-07 PROCEDURE — 11721 DEBRIDE NAIL 6 OR MORE: CPT | Performed by: PODIATRIST

## 2022-01-07 PROCEDURE — G8484 FLU IMMUNIZE NO ADMIN: HCPCS | Performed by: FAMILY MEDICINE

## 2022-01-07 PROCEDURE — 1036F TOBACCO NON-USER: CPT | Performed by: FAMILY MEDICINE

## 2022-01-07 PROCEDURE — G8427 DOCREV CUR MEDS BY ELIG CLIN: HCPCS | Performed by: FAMILY MEDICINE

## 2022-01-07 PROCEDURE — 1123F ACP DISCUSS/DSCN MKR DOCD: CPT | Performed by: FAMILY MEDICINE

## 2022-01-07 PROCEDURE — 99214 OFFICE O/P EST MOD 30 MIN: CPT | Performed by: FAMILY MEDICINE

## 2022-01-07 PROCEDURE — 1090F PRES/ABSN URINE INCON ASSESS: CPT | Performed by: FAMILY MEDICINE

## 2022-01-07 PROCEDURE — 4040F PNEUMOC VAC/ADMIN/RCVD: CPT | Performed by: FAMILY MEDICINE

## 2022-01-07 PROCEDURE — G8417 CALC BMI ABV UP PARAM F/U: HCPCS | Performed by: FAMILY MEDICINE

## 2022-01-07 RX ORDER — GABAPENTIN 300 MG/1
300 CAPSULE ORAL 3 TIMES DAILY
Qty: 270 CAPSULE | Refills: 1 | Status: SHIPPED
Start: 2022-01-07 | End: 2022-04-14

## 2022-01-07 RX ORDER — HYDROCODONE BITARTRATE AND ACETAMINOPHEN 7.5; 325 MG/1; MG/1
1 TABLET ORAL EVERY 12 HOURS PRN
Qty: 60 TABLET | Refills: 0 | Status: SHIPPED
Start: 2022-01-07 | End: 2022-02-06

## 2022-01-07 RX ORDER — SERTRALINE HYDROCHLORIDE 25 MG/1
25 TABLET, FILM COATED ORAL DAILY
Qty: 90 TABLET | Refills: 1 | Status: SHIPPED
Start: 2022-01-07 | End: 2022-01-12 | Stop reason: SDUPTHER

## 2022-01-07 RX ORDER — CLOPIDOGREL BISULFATE 75 MG/1
TABLET ORAL
Qty: 90 TABLET | Refills: 1 | Status: SHIPPED
Start: 2022-01-07 | End: 2022-07-14 | Stop reason: ALTCHOICE

## 2022-01-07 RX ORDER — HYDROCODONE BITARTRATE AND ACETAMINOPHEN 7.5; 325 MG/1; MG/1
1 TABLET ORAL EVERY 12 HOURS PRN
Qty: 60 TABLET | Refills: 0 | Status: SHIPPED
Start: 2022-03-04 | End: 2022-04-03

## 2022-01-07 RX ORDER — DONEPEZIL HYDROCHLORIDE 5 MG/1
TABLET, FILM COATED ORAL
Qty: 90 TABLET | Refills: 1 | Status: SHIPPED
Start: 2022-01-07 | End: 2022-04-14 | Stop reason: SDUPTHER

## 2022-01-07 RX ORDER — PANTOPRAZOLE SODIUM 40 MG/1
TABLET, DELAYED RELEASE ORAL
Qty: 90 TABLET | Refills: 1 | Status: SHIPPED
Start: 2022-01-07 | End: 2022-10-18 | Stop reason: SDUPTHER

## 2022-01-07 RX ORDER — ROSUVASTATIN CALCIUM 20 MG/1
TABLET, COATED ORAL
Qty: 90 TABLET | Refills: 1 | Status: SHIPPED
Start: 2022-01-07 | End: 2022-07-14 | Stop reason: ALTCHOICE

## 2022-01-07 RX ORDER — HYDROCODONE BITARTRATE AND ACETAMINOPHEN 7.5; 325 MG/1; MG/1
1 TABLET ORAL EVERY 12 HOURS PRN
Qty: 60 TABLET | Refills: 0 | Status: SHIPPED
Start: 2022-02-04 | End: 2022-03-06

## 2022-01-07 RX ORDER — METOPROLOL SUCCINATE 25 MG/1
12.5 TABLET, EXTENDED RELEASE ORAL NIGHTLY
Qty: 90 TABLET | Refills: 1 | Status: SHIPPED
Start: 2022-01-07 | End: 2022-07-14 | Stop reason: ALTCHOICE

## 2022-01-07 SDOH — ECONOMIC STABILITY: FOOD INSECURITY: WITHIN THE PAST 12 MONTHS, THE FOOD YOU BOUGHT JUST DIDN'T LAST AND YOU DIDN'T HAVE MONEY TO GET MORE.: NEVER TRUE

## 2022-01-07 SDOH — ECONOMIC STABILITY: FOOD INSECURITY: WITHIN THE PAST 12 MONTHS, YOU WORRIED THAT YOUR FOOD WOULD RUN OUT BEFORE YOU GOT MONEY TO BUY MORE.: NEVER TRUE

## 2022-01-07 ASSESSMENT — ENCOUNTER SYMPTOMS
EYES NEGATIVE: 1
GASTROINTESTINAL NEGATIVE: 1
CONSTIPATION: 0
BACK PAIN: 1
DIARRHEA: 0
VOMITING: 0
NAUSEA: 0
COUGH: 0
BACK PAIN: 1
RESPIRATORY NEGATIVE: 1
ABDOMINAL PAIN: 0
WHEEZING: 0
SHORTNESS OF BREATH: 0

## 2022-01-07 ASSESSMENT — SOCIAL DETERMINANTS OF HEALTH (SDOH): HOW HARD IS IT FOR YOU TO PAY FOR THE VERY BASICS LIKE FOOD, HOUSING, MEDICAL CARE, AND HEATING?: NOT HARD AT ALL

## 2022-01-07 NOTE — PROGRESS NOTES
22  Papi Form : 1929 Sex: female  Age: 80 y.o. Chief Complaint   Patient presents with    Blood Work    Other     had pneumonia stated that she needed Xrays     Medication Refill     HPI:  80 y.o. female presents today for 3 month follow up of chronic medical conditions, medication refills and FBW . Patient's chart, medical, surgical and medication history all reviewed. Chronic Pain  Patient has long standing history of chronic back pain with numbness and tingling in her feet. Has had long standing lumbar disc disease with past laminectomy. She has been on Norco for many years. Compliant with follow ups. Noting that she is having more pain in the lateral hips and into her legs. Does not walk much anymore- mostly sedentary. States that the neuropathy in her feet is the worst part. Now having \"twitching\" of her upper extremities. Coronary Artery Disease   Patient presents for routine coronary artery disease follow-up. Cardiac risk factors include advanced age (older than 54 for men, 72 for women), dyslipidemia, hypertension, obesity (BMI >= 30 kg/m2) and sedentary lifestyle. Patient has a history of stent placement years ago. She is  on ASA/Statin/BB/ACEi. Incontinence  Patient also has a long standing history of incontinence. Previously received Botox injections that were very helpful. Since they've worn off, patient having recurrent issues. Does not want to have injections again. Tried Oxybutynin, but didn't notice a difference. Myrbetriq is doing well controlling frequency of urination. ROS:  Review of Systems   Constitutional: Negative for chills, fatigue and fever. Respiratory: Negative for cough, shortness of breath and wheezing. Cardiovascular: Negative for chest pain and palpitations. Gastrointestinal: Negative for abdominal pain, constipation, diarrhea, nausea and vomiting. Genitourinary: Positive for frequency (incontinence). Musculoskeletal: Positive for arthralgias, back pain and gait problem. Skin: Negative for rash. Neurological: Positive for weakness and numbness. Negative for dizziness and headaches. Psychiatric/Behavioral: Negative for dysphoric mood. The patient is not nervous/anxious. All other systems reviewed and are negative. Current Outpatient Medications on File Prior to Visit   Medication Sig Dispense Refill    rosuvastatin (CRESTOR) 20 MG tablet TAKE ONE TABLET BY MOUTH DAILY 90 tablet 1    albuterol sulfate HFA (VENTOLIN HFA) 108 (90 Base) MCG/ACT inhaler Inhale 2 puffs into the lungs 4 times daily as needed for Wheezing 54 g 1    mirabegron (MYRBETRIQ) 25 MG TB24 Take 1 tablet by mouth daily 30 tablet 5    metoprolol succinate (TOPROL XL) 25 MG extended release tablet Take 0.5 tablets by mouth nightly TAKE 0.5 MG DAILY 90 tablet 1    clopidogrel (PLAVIX) 75 MG tablet TAKE ONE TABLET BY MOUTH DAILY 90 tablet 1    pantoprazole (PROTONIX) 40 MG tablet TAKE ONE TABLET BY MOUTH DAILY 90 tablet 1    gabapentin (NEURONTIN) 100 MG capsule Take 1 capsule by mouth 3 times daily for 180 days. 270 capsule 1    HYDROcodone-acetaminophen (NORCO) 7.5-325 MG per tablet Take 1 tablet by mouth every 12 hours as needed for Pain.  donepezil (ARICEPT) 5 MG tablet TAKE ONE TABLET BY MOUTH DAILY WITH BREAKFAST. 90 tablet 1    aspirin 325 MG tablet Take 325 mg by mouth every morning (before breakfast)      sertraline (ZOLOFT) 25 MG tablet Take 1 tablet by mouth daily 1/2 daily 90 tablet 1     No current facility-administered medications on file prior to visit.        Allergies   Allergen Reactions    Bactrim [Sulfamethoxazole-Trimethoprim]     Lyrica [Pregabalin]     Sulfa Antibiotics Rash       Past Medical History:   Diagnosis Date    Anxiety with depression     Arrhythmia     PAF    CAD (coronary artery disease)     Chronic kidney disease     stage 3    Chronic pain syndrome     Colon cancer (HCC)  History of CVA (cerebrovascular accident) without residual deficits     Hyperlipidemia     Hypertension     Osteoarthritis     Shingles     Sleep apnea     TIA (transient ischemic attack)      Past Surgical History:   Procedure Laterality Date    BARTHOLIN GLAND CYST EXCISION      CARPAL TUNNEL RELEASE      CATARACT REMOVAL      COLECTOMY      with colostomy    DIAGNOSTIC CARDIAC CATH LAB PROCEDURE      drug eluting stent proximal RCA and diagonal branch    HYSTERECTOMY      LUMBAR LAMINECTOMY       Family History   Problem Relation Age of Onset    Heart Disease Mother         age Pérez  1560    Colon Cancer Father         age 80    No Known Problems Brother     Cancer Brother         liver     Social History     Socioeconomic History    Marital status:      Spouse name: Not on file    Number of children: Not on file    Years of education: Not on file    Highest education level: Not on file   Occupational History    Occupation: retired various jobs/sales   Tobacco Use    Smoking status: Never Smoker    Smokeless tobacco: Never Used   Vaping Use    Vaping Use: Never used   Substance and Sexual Activity    Alcohol use: Never    Drug use: Never    Sexual activity: Not Currently     Partners: Male     Birth control/protection: Post-menopausal   Other Topics Concern    Not on file   Social History Narrative    Not on file     Social Determinants of Health     Financial Resource Strain: Low Risk     Difficulty of Paying Living Expenses: Not hard at all   Food Insecurity: No Food Insecurity    Worried About [facility] [facility] in the Last Year: Never true    [facility] in the Last Year: Never true   Transportation Needs:     Lack of Transportation (Medical): Not on file    Lack of Transportation (Non-Medical):  Not on file   Physical Activity:     Days of Exercise per Week: Not on file    Minutes of Exercise per Session: Not on file   Stress:     Feeling of Stress : Not on file Social Connections:     Frequency of Communication with Friends and Family: Not on file    Frequency of Social Gatherings with Friends and Family: Not on file    Attends Yarsanism Services: Not on file    Active Member of Clubs or Organizations: Not on file    Attends Club or Organization Meetings: Not on file    Marital Status: Not on file   Intimate Partner Violence:     Fear of Current or Ex-Partner: Not on file    Emotionally Abused: Not on file    Physically Abused: Not on file    Sexually Abused: Not on file   Housing Stability:     Unable to Pay for Housing in the Last Year: Not on file    Number of [place] in the Last Year: Not on file    Unstable Housing in the Last Year: Not on file       Vitals:    01/07/22 1549   BP: 126/74   Pulse: 75   Resp: 19   Temp: 97.3 °F (36.3 °C)   TempSrc: Temporal   SpO2: 98%   Weight: 180 lb (81.6 kg)   Height: 4' 11\" (1.499 m)       Physical Exam:  Physical Exam  Vitals and nursing note reviewed. Constitutional:       General: She is not in acute distress. Appearance: Normal appearance. She is well-developed. She is obese. She is not ill-appearing. HENT:      Head: Normocephalic and atraumatic. Right Ear: Hearing and external ear normal.      Left Ear: Hearing and external ear normal.      Nose:      Comments: Wearing mask  Eyes:      General: Lids are normal. No scleral icterus. Extraocular Movements: Extraocular movements intact. Conjunctiva/sclera: Conjunctivae normal.   Neck:      Thyroid: No thyromegaly. Cardiovascular:      Rate and Rhythm: Normal rate and regular rhythm. Heart sounds: Normal heart sounds. No murmur heard. Pulmonary:      Effort: Pulmonary effort is normal. No respiratory distress. Breath sounds: Normal breath sounds. No wheezing. Musculoskeletal:         General: No tenderness or deformity. Cervical back: Normal range of motion and neck supple. Right lower leg: No edema.       Left lower leg: No edema. Lymphadenopathy:      Cervical: No cervical adenopathy. Skin:     General: Skin is warm and dry. Findings: No rash. Neurological:      General: No focal deficit present. Mental Status: She is alert and oriented to person, place, and time. Gait: Gait abnormal (patient seated in wheelchair). Psychiatric:         Mood and Affect: Mood and affect normal.         Speech: Speech normal.         Behavior: Behavior normal.         Thought Content: Thought content normal.         Labs:  CBC with Differential:    Lab Results   Component Value Date    WBC 3.3 11/30/2021    RBC 3.83 11/30/2021    HGB 11.4 11/30/2021    HCT 34.7 11/30/2021    PLT 54 11/30/2021    MCV 90.5 11/30/2021    MCH 29.8 11/30/2021    MCHC 32.9 11/30/2021    RDW 14.4 11/30/2021    SEGSPCT 84.6 11/30/2021    LYMPHOPCT 12.2 11/30/2021    MONOPCT 2.9 11/30/2021    BASOPCT 0.3 11/30/2021    MONOSABS 0.1 11/30/2021    LYMPHSABS 0.4 11/30/2021    EOSABS 0.0 11/30/2021    BASOSABS 0.0 11/30/2021     CMP:    Lab Results   Component Value Date     11/30/2021    K 4.5 11/30/2021     11/30/2021    CO2 25 11/30/2021    BUN 18 11/30/2021    CREATININE 1.1 11/30/2021    CREATININE 1.3 12/14/2018    GFRAA 46 06/23/2021    AGRATIO 0.9 11/30/2021    LABGLOM 46 11/30/2021    GLUCOSE 155 11/30/2021    PROT 6.9 11/30/2021    LABALBU 3.3 11/30/2021    CALCIUM 8.6 11/30/2021    BILITOT 0.6 11/30/2021    ALKPHOS 40 11/30/2021    AST 22 11/30/2021    ALT 13 11/30/2021     Uric Acid:  No results found for: Bowen Chicas  Microalbumen/Creatinine ratio:  No components found for: RUCREAT  FLP:    Lab Results   Component Value Date    TRIG 109 06/23/2021    HDL 48 06/23/2021    LDLCALC 56 06/23/2021    LABVLDL 22 06/23/2021     TSH:    Lab Results   Component Value Date    TSH 0.67 11/30/2021          Assessment and Plan:  Joan Sutton was seen today for blood work, other and medication refill.     Diagnoses and all orders for this visit:    Essential hypertension  Well controlled. Due for fasting labs. Mixed hyperlipidemia    Coronary artery disease involving native coronary artery of native heart without angina pectoris  -     rosuvastatin (CRESTOR) 20 MG tablet; TAKE ONE TABLET BY MOUTH DAILY    Gastroesophageal reflux disease without esophagitis  -     pantoprazole (PROTONIX) 40 MG tablet; TAKE ONE TABLET BY MOUTH DAILY    History of stroke without residual deficits  -     metoprolol succinate (TOPROL XL) 25 MG extended release tablet; Take 0.5 tablets by mouth nightly TAKE 0.5 MG DAILY  -     clopidogrel (PLAVIX) 75 MG tablet; TAKE ONE TABLET BY MOUTH DAILY    Generalized anxiety disorder  -     sertraline (ZOLOFT) 25 MG tablet; Take 1 tablet by mouth daily 1/2 daily  Stable    Primary osteoarthritis involving multiple joints  -     gabapentin (NEURONTIN) 300 MG capsule; Take 1 capsule by mouth 3 times daily for 90 days.  -     HYDROcodone-acetaminophen (NORCO) 7.5-325 MG per tablet; Take 1 tablet by mouth every 12 hours as needed for Pain for up to 30 days. Intended supply: 30 days  -     HYDROcodone-acetaminophen (NORCO) 7.5-325 MG per tablet; Take 1 tablet by mouth every 12 hours as needed for Pain for up to 30 days. Intended supply: 30 days  -     HYDROcodone-acetaminophen (NORCO) 7.5-325 MG per tablet; Take 1 tablet by mouth every 12 hours as needed for Pain for up to 30 days. Intended supply: 30 days  OARRS reviewed and is appropriate. 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 Chronic renal impairment, stage 3b (HCC)    Severe obesity (BMI 35.0-35.9 with comorbidity) ([facility] [address]        Return in about 3 months (around 4/7/2022), or if symptoms worsen or fail to improve, for AWV.       Seen By:  Valerie Olguin, DO

## 2022-01-07 NOTE — PROGRESS NOTES
Gilmar Pittard  Return Patient    Chief Complaint   Patient presents with    Toe Pain     saw pcp Dr. Liz Buchanan 1/7/22       Subjective: This Tamar Aleman comes to clinic for foot and nail care. Pt currently has complaint of thickened, painful, elongated nails that he/she cannot manage by themselves. Pt. Relates pain to nails with shoe gear. Pt's primary care physician is Liz Buchanan DO. Pt having  Pain numbness  Burning  both feet   No trauma  On gabapentin 100 tid   Past Medical History:   Diagnosis Date    Anxiety with depression     Arrhythmia     PAF    CAD (coronary artery disease)     Chronic kidney disease     stage 3    Chronic pain syndrome     Colon cancer (HCC)     History of CVA (cerebrovascular accident) without residual deficits     Hyperlipidemia     Hypertension     Osteoarthritis     Pneumonia due to infectious organism 12/1/2021    Shingles     Sleep apnea     TIA (transient ischemic attack)        Allergies   Allergen Reactions    Bactrim [Sulfamethoxazole-Trimethoprim]     Lyrica [Pregabalin]     Sulfa Antibiotics Rash     Current Outpatient Medications on File Prior to Visit   Medication Sig Dispense Refill    pantoprazole (PROTONIX) 40 MG tablet TAKE ONE TABLET BY MOUTH DAILY 90 tablet 1    metoprolol succinate (TOPROL XL) 25 MG extended release tablet Take 0.5 tablets by mouth nightly TAKE 0.5 MG DAILY 90 tablet 1    sertraline (ZOLOFT) 25 MG tablet Take 1 tablet by mouth daily 1/2 daily 90 tablet 1    clopidogrel (PLAVIX) 75 MG tablet TAKE ONE TABLET BY MOUTH DAILY 90 tablet 1    gabapentin (NEURONTIN) 300 MG capsule Take 1 capsule by mouth 3 times daily for 90 days. 270 capsule 1    donepezil (ARICEPT) 5 MG tablet TAKE ONE TABLET BY MOUTH DAILY WITH BREAKFAST.  90 tablet 1    rosuvastatin (CRESTOR) 20 MG tablet TAKE ONE TABLET BY MOUTH DAILY 90 tablet 1    [START ON 2/4/2022] HYDROcodone-acetaminophen (NORCO) 7.5-325 MG per tablet Take 1 tablet by mouth every 12 hours as needed for Pain for up to 30 days. Intended supply: 30 days 60 tablet 0    HYDROcodone-acetaminophen (NORCO) 7.5-325 MG per tablet Take 1 tablet by mouth every 12 hours as needed for Pain for up to 30 days. Intended supply: 30 days 60 tablet 0    [START ON 3/4/2022] HYDROcodone-acetaminophen (NORCO) 7.5-325 MG per tablet Take 1 tablet by mouth every 12 hours as needed for Pain for up to 30 days. Intended supply: 30 days 60 tablet 0    albuterol sulfate HFA (VENTOLIN HFA) 108 (90 Base) MCG/ACT inhaler Inhale 2 puffs into the lungs 4 times daily as needed for Wheezing 54 g 1    mirabegron (MYRBETRIQ) 25 MG TB24 Take 1 tablet by mouth daily 30 tablet 5    HYDROcodone-acetaminophen (NORCO) 7.5-325 MG per tablet Take 1 tablet by mouth every 12 hours as needed for Pain.  aspirin 325 MG tablet Take 325 mg by mouth every morning (before breakfast)       No current facility-administered medications on file prior to visit. Review of Systems   Constitutional: Negative. HENT: Negative. Eyes: Negative. Respiratory: Negative. Cardiovascular: Negative. Gastrointestinal: Negative. Endocrine: Negative. Genitourinary: Negative. Musculoskeletal: Positive for arthralgias, back pain, gait problem and joint swelling. Objective:  General: AAO x 3 in NAD.     Derm  Toenail Description  Sites of Onychomycosis Involvement (Check affected area)  [x] [x] [x] [x] [x] [x] [x] [x] [x] [x]  5 4 3 2 1 1 2 3 4 5                          Right                                        Left    Thickness  [x] [x] [x] [x] [x] [x] [x] [x] [x] [x]  5 4 3 2 1 1 2 3 4 5                         Right                                        Left    Dystrophic Changes   [x] [x] [x] [x] [x] [x] [x] [x] [x] [x]  5 4 3 2 1 1 2 3 4 5                         Right                                        Left    Color  [x] [x] [x] [x] [x] [x] [x] [x] [x] [x]  5 4 3 2 1 1 2 3 4 5 Right                                        Left    Incurvation/Ingrowin   [x] [x] [x] [x] [x] [x] [x] [x] [x] [x]  5 4 3 2 1 1 2 3 4 5                         Right                                        Left    Inflammation/Pain   [x] [x] [x] [x] [x] [x] [x] [x] [x] [x]  5 4 3  2 1 1 2 3 4 5                         Right                                        Left      Nails that are described above are all elongated thickened pitting mycotic yellowish incurvated causing pain with both shoe gear. Palpation1-5  Bilateral     Dermatologic Exam:hair loss noted  lower extremity    Skin lesion/ulceration   Skin   Callus neg  Musculoskeletal:     1st MPJ ROM normal, Bilateral.  Muscle strength 5/5, Bilateral.  Pain present upon palpation of toenails 1-5  Bilateral., Bilateral.  Ankle ROM normal,Bilateral.    Dorsally contracted digits, Bilateral.     Vascular:  Pulses   bilateral DP absent    PT absent    Neurological:  Sensation present to light touch to level of digits, Bilateral.    Foot Exam    General  General Appearance: appears stated age and healthy   Orientation: alert and oriented to person, place, and time   Assistance: walker use and wheelchair use       Right Foot/Ankle     Inspection and Palpation  Tenderness: great toe metatarsophalangeal joint and metatarsals   Hallux valgus: yes  Hallux limitus: yes  Skin Exam: abnormal color; Neurovascular  Dorsalis pedis: absent  Posterior tibial: absent  Saphenous nerve sensation: diminished  Tibial nerve sensation: diminished  Superficial peroneal nerve sensation: diminished  Deep peroneal nerve sensation: diminished  Sural nerve sensation: diminished      Left Foot/Ankle      Inspection and Palpation  Tenderness: metatarsals   Hallux valgus: yes  Skin Exam: abnormal color;      Neurovascular  Dorsalis pedis: absent  Posterior tibial: absent  Saphenous nerve sensation: diminished  Tibial nerve sensation: diminished  Superficial peroneal nerve sensation: diminished  Deep peroneal nerve sensation: diminished  Sural nerve sensation: diminished             Ortho Exam  Q7   []Yes    []No                Q8   [x]Yes    []No                     Q9   []Yes    []No  Assessment:  80 y.o. female with:   1. Tinea unguium    2. Peripheral vascular disease, unspecified ([facility] [address]    3. Pain in toe of right foot    4. Pain in left toe(s)    5. Difficulty walking     No orders of the defined types were placed in this encounter. Plan:  Pt seen for  chronic pain   oarrs report reviewed I have reviewed current medication  and prior noted I believe the need for refill is warranted  I have reviewed oarrs report and found no drug seeking behavior   I have discussed all side effects and narcotic policy   with pt  pt understands and agrees with treatment  a follow up  appointment is made    Pt was evaluated and examined. Patient was given personalized discharge instructions. Nails 1-10 were debrided in length and thickness sharply with a nail nipper and  without incident. Pt will follow up in 9 weeks or sooner if any problems arise. Diagnosis was discussed with the pt and all of their questions were answered in detail. Proper foot hygiene and care was discussed with the pt. Patient to check feet daily and contact the office with any questions/problems/concerns. Other comorbidity noted and will be managed by PCP. Pain waiver discussed with patient and confirmed.  Refilled gabapentin   1/7/2022      Electronically signed by Juvencio Griffin DPM on 1/7/2022 at 4:39 PM  1/7/2022

## 2022-01-12 ENCOUNTER — TELEPHONE (OUTPATIENT)
Dept: FAMILY MEDICINE CLINIC | Age: 87
End: 2022-01-12

## 2022-01-12 DIAGNOSIS — F41.1 GENERALIZED ANXIETY DISORDER: ICD-10-CM

## 2022-01-12 RX ORDER — SERTRALINE HYDROCHLORIDE 25 MG/1
25 TABLET, FILM COATED ORAL DAILY
Qty: 90 TABLET | Refills: 1 | Status: SHIPPED
Start: 2022-01-12 | End: 2022-07-14 | Stop reason: SDUPTHER

## 2022-01-26 ENCOUNTER — TELEPHONE (OUTPATIENT)
Dept: FAMILY MEDICINE CLINIC | Age: 87
End: 2022-01-26

## 2022-01-26 NOTE — TELEPHONE ENCOUNTER
Patient tested positive for covid last Sunday. She states she feels like she is dehydrated as she is hearing voices. Patient does not drink much anyhow according to the son. She did break her fever, no more diarrhea, and no cough. Please advise.

## 2022-01-26 NOTE — TELEPHONE ENCOUNTER
She should really be evaluated in the ER. If she truly is dehydrated, they can give her IV fluids.   She could have a UTI causing some of her symptoms as well

## 2022-03-21 ENCOUNTER — TELEPHONE (OUTPATIENT)
Dept: PRIMARY CARE CLINIC | Age: 87
End: 2022-03-21

## 2022-03-21 DIAGNOSIS — I10 ESSENTIAL HYPERTENSION: ICD-10-CM

## 2022-03-21 DIAGNOSIS — N18.32 CHRONIC RENAL IMPAIRMENT, STAGE 3B (HCC): Primary | ICD-10-CM

## 2022-03-21 DIAGNOSIS — R73.01 IMPAIRED FASTING GLUCOSE: ICD-10-CM

## 2022-03-21 NOTE — TELEPHONE ENCOUNTER
The pt's daughter is calling to see if you can please put some lab orders in the computer for the pt so she can get them drawn before her appointment with you

## 2022-04-04 ENCOUNTER — PROCEDURE VISIT (OUTPATIENT)
Dept: PODIATRY | Age: 87
End: 2022-04-04
Payer: MEDICARE

## 2022-04-04 VITALS
TEMPERATURE: 97.2 F | DIASTOLIC BLOOD PRESSURE: 72 MMHG | BODY MASS INDEX: 36.36 KG/M2 | SYSTOLIC BLOOD PRESSURE: 144 MMHG | WEIGHT: 180 LBS

## 2022-04-04 DIAGNOSIS — M79.675 PAIN IN LEFT TOE(S): ICD-10-CM

## 2022-04-04 DIAGNOSIS — M79.674 PAIN IN TOE OF RIGHT FOOT: ICD-10-CM

## 2022-04-04 DIAGNOSIS — B35.1 TINEA UNGUIUM: Primary | ICD-10-CM

## 2022-04-04 DIAGNOSIS — N18.32 CHRONIC RENAL IMPAIRMENT, STAGE 3B (HCC): ICD-10-CM

## 2022-04-04 DIAGNOSIS — R26.2 DIFFICULTY WALKING: ICD-10-CM

## 2022-04-04 DIAGNOSIS — R73.01 IMPAIRED FASTING GLUCOSE: ICD-10-CM

## 2022-04-04 DIAGNOSIS — I73.9 PERIPHERAL VASCULAR DISEASE, UNSPECIFIED (HCC): ICD-10-CM

## 2022-04-04 DIAGNOSIS — I10 ESSENTIAL HYPERTENSION: ICD-10-CM

## 2022-04-04 LAB
ALBUMIN SERPL-MCNC: 3.8 G/DL (ref 3.5–5.2)
ALP BLD-CCNC: 58 U/L (ref 35–104)
ALT SERPL-CCNC: 7 U/L (ref 0–32)
ANION GAP SERPL CALCULATED.3IONS-SCNC: 14 MMOL/L (ref 7–16)
AST SERPL-CCNC: 18 U/L (ref 0–31)
BASOPHILS ABSOLUTE: 0.03 E9/L (ref 0–0.2)
BASOPHILS RELATIVE PERCENT: 0.5 % (ref 0–2)
BILIRUB SERPL-MCNC: 0.3 MG/DL (ref 0–1.2)
BILIRUBIN URINE: NEGATIVE
BLOOD, URINE: NEGATIVE
BUN BLDV-MCNC: 15 MG/DL (ref 6–23)
CALCIUM SERPL-MCNC: 9.6 MG/DL (ref 8.6–10.2)
CHLORIDE BLD-SCNC: 108 MMOL/L (ref 98–107)
CHOLESTEROL, TOTAL: 128 MG/DL (ref 0–199)
CLARITY: CLEAR
CO2: 23 MMOL/L (ref 22–29)
COLOR: YELLOW
CREAT SERPL-MCNC: 1.3 MG/DL (ref 0.5–1)
EOSINOPHILS ABSOLUTE: 0.1 E9/L (ref 0.05–0.5)
EOSINOPHILS RELATIVE PERCENT: 1.6 % (ref 0–6)
FOLATE: >20 NG/ML (ref 4.8–24.2)
GFR AFRICAN AMERICAN: 46
GFR NON-AFRICAN AMERICAN: 38 ML/MIN/1.73
GLUCOSE BLD-MCNC: 132 MG/DL (ref 74–99)
GLUCOSE URINE: NEGATIVE MG/DL
HBA1C MFR BLD: 4.7 % (ref 4–5.6)
HCT VFR BLD CALC: 37.6 % (ref 34–48)
HDLC SERPL-MCNC: 49 MG/DL
HEMOGLOBIN: 11.5 G/DL (ref 11.5–15.5)
IMMATURE GRANULOCYTES #: 0.07 E9/L
IMMATURE GRANULOCYTES %: 1.1 % (ref 0–5)
KETONES, URINE: NEGATIVE MG/DL
LDL CHOLESTEROL CALCULATED: 48 MG/DL (ref 0–99)
LEUKOCYTE ESTERASE, URINE: NEGATIVE
LYMPHOCYTES ABSOLUTE: 0.98 E9/L (ref 1.5–4)
LYMPHOCYTES RELATIVE PERCENT: 15.8 % (ref 20–42)
MCH RBC QN AUTO: 30.3 PG (ref 26–35)
MCHC RBC AUTO-ENTMCNC: 30.6 % (ref 32–34.5)
MCV RBC AUTO: 99.2 FL (ref 80–99.9)
MONOCYTES ABSOLUTE: 0.53 E9/L (ref 0.1–0.95)
MONOCYTES RELATIVE PERCENT: 8.5 % (ref 2–12)
NEUTROPHILS ABSOLUTE: 4.51 E9/L (ref 1.8–7.3)
NEUTROPHILS RELATIVE PERCENT: 72.5 % (ref 43–80)
NITRITE, URINE: NEGATIVE
PDW BLD-RTO: 14 FL (ref 11.5–15)
PH UA: 6 (ref 5–9)
PLATELET # BLD: 65 E9/L (ref 130–450)
PMV BLD AUTO: 12.2 FL (ref 7–12)
POTASSIUM SERPL-SCNC: 4.3 MMOL/L (ref 3.5–5)
PROTEIN UA: NEGATIVE MG/DL
RBC # BLD: 3.79 E12/L (ref 3.5–5.5)
SODIUM BLD-SCNC: 145 MMOL/L (ref 132–146)
SPECIFIC GRAVITY UA: 1.02 (ref 1–1.03)
TOTAL PROTEIN: 6.7 G/DL (ref 6.4–8.3)
TRIGL SERPL-MCNC: 153 MG/DL (ref 0–149)
TSH SERPL DL<=0.05 MIU/L-ACNC: 2.74 UIU/ML (ref 0.27–4.2)
UROBILINOGEN, URINE: 0.2 E.U./DL
VITAMIN B-12: 506 PG/ML (ref 211–946)
VITAMIN D 25-HYDROXY: 60 NG/ML (ref 30–100)
VLDLC SERPL CALC-MCNC: 31 MG/DL
WBC # BLD: 6.2 E9/L (ref 4.5–11.5)

## 2022-04-04 PROCEDURE — 11721 DEBRIDE NAIL 6 OR MORE: CPT | Performed by: PODIATRIST

## 2022-04-04 ASSESSMENT — ENCOUNTER SYMPTOMS
EYES NEGATIVE: 1
RESPIRATORY NEGATIVE: 1
GASTROINTESTINAL NEGATIVE: 1
BACK PAIN: 1

## 2022-04-04 NOTE — PROGRESS NOTES
Janay Sue  Return Patient    Chief Complaint   Patient presents with    Toe Pain     saw pcp Dr. Edgardo Rios 1/7/22       Subjective: This [place] comes to clinic for foot and nail care. Pt currently has complaint of thickened, painful, elongated nails that he/she cannot manage by themselves. Pt. Relates pain to nails with shoe gear. Pt's primary care physician is Edgardo Rios DO. Pt having  Pain numbness  Burning  both feet   No trauma  On gabapentin 100 tid   Past Medical History:   Diagnosis Date    Anxiety with depression     Arrhythmia     PAF    CAD (coronary artery disease)     Chronic kidney disease     stage 3    Chronic pain syndrome     Colon cancer ([facility] [address]     History of CVA (cerebrovascular accident) without residual deficits     Hyperlipidemia     Hypertension     Osteoarthritis     Pneumonia due to infectious organism 12/1/2021    Shingles     Sleep apnea     TIA (transient ischemic attack)        Allergies   Allergen Reactions    Bactrim [Sulfamethoxazole-Trimethoprim]     Lyrica [Pregabalin]     Sulfa Antibiotics Rash     Current Outpatient Medications on File Prior to Visit   Medication Sig Dispense Refill    sertraline (ZOLOFT) 25 MG tablet Take 1 tablet by mouth daily 90 tablet 1    pantoprazole (PROTONIX) 40 MG tablet TAKE ONE TABLET BY MOUTH DAILY 90 tablet 1    metoprolol succinate (TOPROL XL) 25 MG extended release tablet Take 0.5 tablets by mouth nightly TAKE 0.5 MG DAILY 90 tablet 1    clopidogrel (PLAVIX) 75 MG tablet TAKE ONE TABLET BY MOUTH DAILY 90 tablet 1    gabapentin (NEURONTIN) 300 MG capsule Take 1 capsule by mouth 3 times daily for 90 days. 270 capsule 1    donepezil (ARICEPT) 5 MG tablet TAKE ONE TABLET BY MOUTH DAILY WITH BREAKFAST.  90 tablet 1    rosuvastatin (CRESTOR) 20 MG tablet TAKE ONE TABLET BY MOUTH DAILY 90 tablet 1    albuterol sulfate HFA (VENTOLIN HFA) 108 (90 Base) MCG/ACT inhaler Inhale 2 puffs into the lungs 4 times daily as needed for Wheezing 54 g 1    mirabegron (MYRBETRIQ) 25 MG TB24 Take 1 tablet by mouth daily 30 tablet 5    HYDROcodone-acetaminophen (NORCO) 7.5-325 MG per tablet Take 1 tablet by mouth every 12 hours as needed for Pain.  aspirin 325 MG tablet Take 325 mg by mouth every morning (before breakfast)       No current facility-administered medications on file prior to visit. Review of Systems   Constitutional: Negative. HENT: Negative. Eyes: Negative. Respiratory: Negative. Cardiovascular: Negative. Gastrointestinal: Negative. Endocrine: Negative. Genitourinary: Negative. Musculoskeletal: Positive for arthralgias, back pain, gait problem and joint swelling. Objective:  General: AAO x 3 in NAD. Derm  Toenail Description  Sites of Onychomycosis Involvement (Check affected area)  [x] [x] [x] [x] [x] [x] [x] [x] [x] [x]  5 4 3 2 1 1 2 3 4 5                          Right                                        Left    Thickness  [x] [x] [x] [x] [x] [x] [x] [x] [x] [x]  5 4 3 2 1 1 2 3 4 5                         Right                                        Left    Dystrophic Changes   [x] [x] [x] [x] [x] [x] [x] [x] [x] [x]  5 4 3 2 1 1 2 3 4 5                         Right                                        Left    Color  [x] [x] [x] [x] [x] [x] [x] [x] [x] [x]  5 4 3 2 1 1 2 3 4 5                          Right                                        Left    Incurvation/Ingrowin   [x] [x] [x] [x] [x] [x] [x] [x] [x] [x]  5 4 3 2 1 1 2 3 4 5                         Right                                        Left    Inflammation/Pain   [x] [x] [x] [x] [x] [x] [x] [x] [x] [x]  5 4 3  2 1 1 2 3 4 5                         Right                                        Left      Nails that are described above are all elongated thickened pitting mycotic yellowish incurvated causing pain with both shoe gear.  Palpation1-5  Bilateral     Dermatologic Exam:hair loss noted lower extremity    Skin lesion/ulceration   Skin   Callus neg  Musculoskeletal:     1st MPJ ROM normal, Bilateral.  Muscle strength 5/5, Bilateral.  Pain present upon palpation of toenails 1-5  Bilateral., Bilateral.  Ankle ROM normal,Bilateral.    Dorsally contracted digits, Bilateral.     Vascular:  Pulses   bilateral DP absent    PT absent    Neurological:  Sensation present to light touch to level of digits, Bilateral.    Foot Exam    General  General Appearance: appears stated age and healthy   Orientation: alert and oriented to person, place, and time   Assistance: walker use and wheelchair use       Right Foot/Ankle     Inspection and Palpation  Tenderness: great toe metatarsophalangeal joint and metatarsals   Hallux valgus: yes  Hallux limitus: yes  Skin Exam: abnormal color; Neurovascular  Dorsalis pedis: absent  Posterior tibial: absent  Saphenous nerve sensation: diminished  Tibial nerve sensation: diminished  Superficial peroneal nerve sensation: diminished  Deep peroneal nerve sensation: diminished  Sural nerve sensation: diminished      Left Foot/Ankle      Inspection and Palpation  Tenderness: metatarsals   Hallux valgus: yes  Skin Exam: abnormal color; Neurovascular  Dorsalis pedis: absent  Posterior tibial: absent  Saphenous nerve sensation: diminished  Tibial nerve sensation: diminished  Superficial peroneal nerve sensation: diminished  Deep peroneal nerve sensation: diminished  Sural nerve sensation: diminished             Ortho Exam  Q7   []Yes    []No                Q8   [x]Yes    []No                     Q9   []Yes    []No  Assessment:  80 y.o. female with:   1. Tinea unguium    2. Peripheral vascular disease, unspecified ([facility] [address]    3. Pain in toe of right foot    4. Pain in left toe(s)    5. Difficulty walking     No orders of the defined types were placed in this encounter.         Plan:  Pt seen for  chronic pain   oarrs report reviewed I have reviewed current medication  and prior noted I believe the need for refill is warranted  I have reviewed oarrs report and found no drug seeking behavior   I have discussed all side effects and narcotic policy   with pt  pt understands and agrees with treatment  a follow up  appointment is made    Pt was evaluated and examined. Patient was given personalized discharge instructions. Nails 1-10 were debrided in length and thickness sharply with a nail nipper and  without incident. Pt will follow up in 9 weeks or sooner if any problems arise. Diagnosis was discussed with the pt and all of their questions were answered in detail. Proper foot hygiene and care was discussed with the pt. Patient to check feet daily and contact the office with any questions/problems/concerns. Other comorbidity noted and will be managed by PCP. Pain waiver discussed with patient and confirmed.  Refilled gabapentin   4/4/2022      Electronically signed by Alice Billings DPM on 4/4/2022 at 2:30 PM  4/4/2022

## 2022-04-05 LAB — PLATELET CONFIRMATION: NORMAL

## 2022-04-06 DIAGNOSIS — M15.9 PRIMARY OSTEOARTHRITIS INVOLVING MULTIPLE JOINTS: ICD-10-CM

## 2022-04-06 RX ORDER — HYDROCODONE BITARTRATE AND ACETAMINOPHEN 7.5; 325 MG/1; MG/1
1 TABLET ORAL EVERY 12 HOURS PRN
Qty: 30 TABLET | Refills: 0 | Status: SHIPPED
Start: 2022-04-06 | End: 2022-06-27

## 2022-04-14 ENCOUNTER — OFFICE VISIT (OUTPATIENT)
Dept: PRIMARY CARE CLINIC | Age: 87
End: 2022-04-14
Payer: MEDICARE

## 2022-04-14 VITALS
DIASTOLIC BLOOD PRESSURE: 78 MMHG | SYSTOLIC BLOOD PRESSURE: 138 MMHG | WEIGHT: 182 LBS | HEIGHT: 59 IN | OXYGEN SATURATION: 94 % | HEART RATE: 63 BPM | TEMPERATURE: 96.9 F | BODY MASS INDEX: 36.69 KG/M2

## 2022-04-14 DIAGNOSIS — F02.818 EARLY ONSET ALZHEIMER'S DISEASE WITH BEHAVIORAL DISTURBANCE (HCC): ICD-10-CM

## 2022-04-14 DIAGNOSIS — D69.6 THROMBOCYTOPENIA (HCC): ICD-10-CM

## 2022-04-14 DIAGNOSIS — Z00.00 MEDICARE ANNUAL WELLNESS VISIT, SUBSEQUENT: Primary | ICD-10-CM

## 2022-04-14 DIAGNOSIS — G30.0 EARLY ONSET ALZHEIMER'S DISEASE WITH BEHAVIORAL DISTURBANCE (HCC): ICD-10-CM

## 2022-04-14 DIAGNOSIS — M15.9 PRIMARY OSTEOARTHRITIS INVOLVING MULTIPLE JOINTS: ICD-10-CM

## 2022-04-14 PROCEDURE — G0439 PPPS, SUBSEQ VISIT: HCPCS | Performed by: FAMILY MEDICINE

## 2022-04-14 PROCEDURE — 1123F ACP DISCUSS/DSCN MKR DOCD: CPT | Performed by: FAMILY MEDICINE

## 2022-04-14 PROCEDURE — 4040F PNEUMOC VAC/ADMIN/RCVD: CPT | Performed by: FAMILY MEDICINE

## 2022-04-14 PROCEDURE — 99213 OFFICE O/P EST LOW 20 MIN: CPT | Performed by: FAMILY MEDICINE

## 2022-04-14 RX ORDER — DONEPEZIL HYDROCHLORIDE 10 MG/1
10 TABLET, FILM COATED ORAL NIGHTLY
Qty: 90 TABLET | Refills: 1 | Status: SHIPPED | OUTPATIENT
Start: 2022-04-14

## 2022-04-14 RX ORDER — PREDNISONE 10 MG/1
TABLET ORAL
Qty: 30 TABLET | Refills: 0 | Status: SHIPPED | OUTPATIENT
Start: 2022-04-14 | End: 2022-04-26

## 2022-04-14 ASSESSMENT — PATIENT HEALTH QUESTIONNAIRE - PHQ9
SUM OF ALL RESPONSES TO PHQ9 QUESTIONS 1 & 2: 2
SUM OF ALL RESPONSES TO PHQ QUESTIONS 1-9: 2
SUM OF ALL RESPONSES TO PHQ QUESTIONS 1-9: 2
1. LITTLE INTEREST OR PLEASURE IN DOING THINGS: 1
SUM OF ALL RESPONSES TO PHQ QUESTIONS 1-9: 2
SUM OF ALL RESPONSES TO PHQ QUESTIONS 1-9: 2
2. FEELING DOWN, DEPRESSED OR HOPELESS: 1

## 2022-04-14 ASSESSMENT — LIFESTYLE VARIABLES: HOW OFTEN DO YOU HAVE A DRINK CONTAINING ALCOHOL: NEVER

## 2022-04-14 NOTE — PATIENT INSTRUCTIONS
Personalized Preventive Plan for Dee Briones - 4/14/2022  Medicare offers a range of preventive health benefits. Some of the tests and screenings are paid in full while other may be subject to a deductible, co-insurance, and/or copay. Some of these benefits include a comprehensive review of your medical history including lifestyle, illnesses that may run in your family, and various assessments and screenings as appropriate. After reviewing your medical record and screening and assessments performed today your provider may have ordered immunizations, labs, imaging, and/or referrals for you. A list of these orders (if applicable) as well as your Preventive Care list are included within your After Visit Summary for your review. Other Preventive Recommendations:    · A preventive eye exam performed by an eye specialist is recommended every 1-2 years to screen for glaucoma; cataracts, macular degeneration, and other eye disorders. · A preventive dental visit is recommended every 6 months. · Try to get at least 150 minutes of exercise per week or 10,000 steps per day on a pedometer . · Order or download the FREE \"Exercise & Physical Activity: Your Everyday Guide\" from [employer]. Call 0-847.868.3523 or search [employer] online. · You need 0849-1138 mg of calcium and 0542-9980 IU of vitamin D per day. It is possible to meet your calcium requirement with diet alone, but a vitamin D supplement is usually necessary to meet this goal.  · When exposed to the sun, use a sunscreen that protects against both UVA and UVB radiation with an SPF of 30 or greater. Reapply every 2 to 3 hours or after sweating, drying off with a towel, or swimming. · Always wear a seat belt when traveling in a car. Always wear a helmet when riding a bicycle or motorcycle.

## 2022-04-14 NOTE — PROGRESS NOTES
Medicare Annual Wellness Visit    Yaw Lazar is here for Medicare AWV and Discuss Medications (pt cannot tolerate the 300mg gabapentin tab and wants to switch back to 100mg tab)    Ever Avila was seen today for medicare aw and discuss medications. Diagnoses and all orders for this visit:    Medicare annual wellness visit, subsequent  HRA reviewed and addressed. UTD on HM. Labs reviewed in detail. Platelets improved, but still low- recently recovering from [place]. Primary osteoarthritis involving multiple joints  -     HYDROcodone-acetaminophen (NORCO) 7.5-325 MG per tablet; Take 1 tablet by mouth every 12 hours as needed for Pain for up to 30 days. Intended supply: 30 days  -     HYDROcodone-acetaminophen (NORCO) 7.5-325 MG per tablet; Take 1 tablet by mouth every 12 hours as needed for Pain for up to 30 days. Intended supply: 30 days  -     HYDROcodone-acetaminophen (NORCO) 7.5-325 MG per tablet; Take 1 tablet by mouth every 12 hours as needed for Pain for up to 30 days. Intended supply: 30 days  -     predniSONE (DELTASONE) 10 MG tablet; Take 4 tablets by mouth daily for 3 days, THEN 3 tablets daily for 3 days, THEN 2 tablets daily for 3 days, THEN 1 tablet daily for 3 days. OARRS reviewed and is appropriate. 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 Will increase to 10 mg. If dreams worsen, will try different medication. Thrombocytopenia (HCC)  -     predniSONE (DELTASONE) 10 MG tablet; Take 4 tablets by mouth daily for 3 days, THEN 3 tablets daily for 3 days, THEN 2 tablets daily for 3 days, THEN 1 tablet daily for 3 days. -     CBC with Auto Differential; Future  Improving, but still low. Will try using Prednisone for pain and platelets. Recheck prior to next visit.            Recommendations for Preventive Services Due: see orders and patient instructions/AVS.  Recommended screening schedule for the next 5-10 years is provided to the patient in written form: see Patient Instructions/AVS.     Return in about 3 months (around 7/14/2022), or if symptoms worsen or fail to improve, for Chronic medical conditions. Subjective   The following acute and/or chronic problems were also addressed today:    Chronic Pain  Patient has long standing history of chronic back pain with numbness and tingling in her feet. Has had long standing lumbar disc disease with past laminectomy. She has been on Norco for many years. Compliant with follow ups. Noting that she is having more pain in the lateral hips and into her legs. Does not walk much anymore- mostly sedentary. States that the neuropathy in her feet is the worst part. Now having \"twitching\" of her upper extremities. Tried increasing Gabapentin, but having too much drowsiness with 300 mg dose. Patient's complete Health Risk Assessment and screening values have been reviewed and are found in Flowsheets. The following problems were reviewed today and where indicated follow up appointments were made and/or referrals ordered. Positive Risk Factor Screenings with Interventions:    Fall Risk:  Do you feel unsteady or are you worried about falling? : (!) yes  2 or more falls in past year?: (!) yes  Fall with injury in past year?: no     Fall Risk Interventions:    · uses wheelchair/walker in the house    Cognitive: Words recalled: 2 Words Recalled  Clock Drawing Test (CDT): (!) Abnormal  Total Score Interpretation: Abnormal Mini-Cog  Did the patient refuse to take the cognition test?: No    Cognitive Impairment Interventions:  · Patient declines any further evaluation/treatment for cognitive impairment             Opioid Risk: (Low risk score <55) Opioid risk score: 10    Patient is low risk for opioid use disorder or overdose.   Last PDMP Sameer Isabel as Reviewed:  Review User Review Instant Review Result   Vasyl Calero 4/14/2022  4:25 PM Reviewed PDMP [1]     Last Controlled Substance Monitoring Documentation      Office Visit from 4/14/2022 in [facility]   Periodic Controlled Substance Monitoring Possible medication side effects, risk of tolerance/dependence & alternative treatments discussed., No signs of potential drug abuse or diversion identified. , Assessed functional status. filed at 04/16/2022 2200   Chronic Pain > 50 MEDD Re-evaluated the status of the patient's underlying condition causing pain. filed at 04/16/2022 [address] and ACP:  General  In general, how would you say your health is?: (!) Poor  In the past 7 days, have you experienced any of the following: New or Increased Pain, New or Increased Fatigue, Loneliness, Social Isolation, Stress or Anger?: (!) Yes  Select all that apply: (!) Loneliness  Do you get the social and emotional support that you need?: Yes  Do you have a Living Will?: Yes    Advance Directives     Power of  Living Will ACP-Advance Directive ACP-Power of     Not on File Not on File Not on File Not on File      General Health Risk Interventions:  · Poor self-assessment of health status: difficulty with incontinence and pain.   Struggles to control either one  · Loneliness: lives with daughter    Health Habits/Nutrition:     Physical Activity: Inactive    Days of Exercise per Week: 0 days    Minutes of Exercise per Session: 0 min     Have you lost any weight without trying in the past 3 months?: No  Body mass index: (!) 36.76  Have you seen the dentist within the past year?: (!) No    Health Habits/Nutrition Interventions:  · Inadequate physical activity:  patient is not ready to increase his/her physical activity level at this time  · Dental exam overdue:  patient encouraged to make appointment with his/her dentist    Hearing/Vision:  Do you or your family notice any trouble with your hearing that hasn't been managed with hearing aids?: No  Do you have difficulty driving, watching TV, or doing any of your daily activities because of your eyesight?: (!) Yes  Have you had an eye exam within the past year?: Yes  No exam data present    Hearing/Vision Interventions:  · Vision concerns:  patient encouraged to make appointment with his/her eye specialist    Safety:  Do you have working smoke detectors?: Yes  Do you have any tripping hazards - loose or unsecured carpets or rugs?: (!) Yes  Do you have any tripping hazards - clutter in doorways, halls, or stairs?: No  Do you have either shower bars, grab bars, non-slip mats or non-slip surfaces in your shower or bathtub?: Yes  Do all of your stairways have a railing or banister?: Yes  Do you always fasten your seatbelt when you are in a car?: Yes    Safety Interventions:  · Home safety tips provided    ADLs:  In the past 7 days, did you need help from others to perform any of the following everyday activities: Eating, dressing, grooming, bathing, toileting, or walking/balance?: (!) Yes  Select all that apply: (!) Dressing,Grooming,Bathing  In the past 7 days, did you need help from others to take care of any of the following: Laundry, housekeeping, banking/finances, shopping, telephone use, food preparation, transportation, or taking medications?: (!) Yes  Select all that apply: (!) Laundry,Housekeeping,Banking/Finances,Shopping,Telephone Use,Food Preparation,Transportation,Taking Medications    ADL Interventions:  · Patient declines any further evaluation/treatment for this issue          Objective   Vitals:    04/14/22 1554 04/14/22 1624   BP: (!) 164/72 138/78   Pulse: 63    Temp: 96.9 °F (36.1 °C)    SpO2: 94%    Weight: 182 lb (82.6 kg)    Height: 4' 11\" (1.499 m)       Body mass index is 36.76 kg/m². Physical Exam  Vitals and nursing note reviewed. Constitutional:       General: She is not in acute distress. Appearance: Normal appearance.  She is well-developed. She is obese. She is not ill-appearing. HENT:      Head: Normocephalic and atraumatic. Right Ear: Hearing and external ear normal.      Left Ear: Hearing and external ear normal.      Nose:      Comments: Wearing mask  Eyes:      General: Lids are normal. No scleral icterus. Extraocular Movements: Extraocular movements intact. Conjunctiva/sclera: Conjunctivae normal.   Neck:      Thyroid: No thyromegaly. Vascular: No carotid bruit. Cardiovascular:      Rate and Rhythm: Normal rate and regular rhythm. Heart sounds: Normal heart sounds. No murmur heard. Pulmonary:      Effort: Pulmonary effort is normal. No respiratory distress. Breath sounds: Normal breath sounds. No wheezing. Musculoskeletal:         General: No tenderness or deformity. Cervical back: Normal range of motion and neck supple. Right lower leg: No edema. Left lower leg: No edema. Lymphadenopathy:      Cervical: No cervical adenopathy. Skin:     General: Skin is warm and dry. Findings: No rash. Neurological:      General: No focal deficit present. Mental Status: She is alert and oriented to person, place, and time. Gait: Gait abnormal (patient seated in wheelchair). Psychiatric:         Mood and Affect: Mood and affect normal.         Speech: Speech normal.         Behavior: Behavior normal.         Thought Content: Thought content normal.              Allergies   Allergen Reactions    Bactrim [Sulfamethoxazole-Trimethoprim]     Lyrica [Pregabalin]     Sulfa Antibiotics Rash     Prior to Visit Medications    Medication Sig Taking? Authorizing Provider   HYDROcodone-acetaminophen (NORCO) 7.5-325 MG per tablet Take 1 tablet by mouth every 12 hours as needed for Pain for up to 30 days.  Intended supply: 30 days Yes Lisha Asher DO   HYDROcodone-acetaminophen (NORCO) 7.5-325 MG per tablet Take 1 tablet by mouth every 12 hours as needed for Pain for up to 30 days. Intended supply: 30 days Yes Lisha Asher, DO   HYDROcodone-acetaminophen (NORCO) 7.5-325 MG per tablet Take 1 tablet by mouth every 12 hours as needed for Pain for up to 30 days. Intended supply: 30 days Yes Lisha Asher DO   Cyanocobalamin 2500 MCG TABS Daily Yes Historical Provider, MD   donepezil (ARICEPT) 10 MG tablet Take 1 tablet by mouth nightly Yes Lisha Asher DO   predniSONE (DELTASONE) 10 MG tablet Take 4 tablets by mouth daily for 3 days, THEN 3 tablets daily for 3 days, THEN 2 tablets daily for 3 days, THEN 1 tablet daily for 3 days. Yes Lisha Asher, DO   HYDROcodone-acetaminophen (NORCO) 7.5-325 MG per tablet Take 1 tablet by mouth every 12 hours as needed for Pain for up to 15 days.  Yes Lisha Asher, DO   sertraline (ZOLOFT) 25 MG tablet Take 1 tablet by mouth daily Yes Lisha Asher DO   pantoprazole (PROTONIX) 40 MG tablet TAKE ONE TABLET BY MOUTH DAILY Yes Sherin Olsen DO   metoprolol succinate (TOPROL XL) 25 MG extended release tablet Take 0.5 tablets by mouth nightly TAKE 0.5 MG DAILY Yes Lisha Asher DO   clopidogrel (PLAVIX) 75 MG tablet TAKE ONE TABLET BY MOUTH DAILY Yes Sherin Olsen DO   rosuvastatin (CRESTOR) 20 MG tablet TAKE ONE TABLET BY MOUTH DAILY Yes Lisha Asher DO   albuterol sulfate HFA (VENTOLIN HFA) 108 (90 Base) MCG/ACT inhaler Inhale 2 puffs into the lungs 4 times daily as needed for Wheezing Yes Cristopher Rdz,    aspirin 325 MG tablet Take 325 mg by mouth every morning (before breakfast) Yes Historical Provider, MD Huber (Including outside providers/suppliers regularly involved in providing care):   Patient Care Team:  Sherin Olsen DO as PCP - General (Family Medicine)  Sherin Olsen DO as [facility] - [facility] [address], DPM as Consulting Physician (Podiatry)  Mariola Suarez RN as Ambulatory Care Manager    Reviewed and updated this visit:  Tobacco  Allergies  Meds  Problems Med Hx  Surg Hx  Soc Hx  Fam Hx

## 2022-04-16 RX ORDER — HYDROCODONE BITARTRATE AND ACETAMINOPHEN 7.5; 325 MG/1; MG/1
1 TABLET ORAL EVERY 12 HOURS PRN
Qty: 60 TABLET | Refills: 0 | Status: SHIPPED
Start: 2022-04-21 | End: 2022-06-27

## 2022-04-16 RX ORDER — HYDROCODONE BITARTRATE AND ACETAMINOPHEN 7.5; 325 MG/1; MG/1
1 TABLET ORAL EVERY 12 HOURS PRN
Qty: 60 TABLET | Refills: 0 | Status: SHIPPED
Start: 2022-06-20 | End: 2022-10-18 | Stop reason: SDUPTHER

## 2022-04-16 RX ORDER — HYDROCODONE BITARTRATE AND ACETAMINOPHEN 7.5; 325 MG/1; MG/1
1 TABLET ORAL EVERY 12 HOURS PRN
Qty: 60 TABLET | Refills: 0 | Status: SHIPPED
Start: 2022-05-21 | End: 2022-07-14

## 2022-05-03 ENCOUNTER — CARE COORDINATION (OUTPATIENT)
Dept: CARE COORDINATION | Age: 87
End: 2022-05-03

## 2022-05-05 ENCOUNTER — CARE COORDINATION (OUTPATIENT)
Dept: CARE COORDINATION | Age: 87
End: 2022-05-05

## 2022-05-05 NOTE — CARE COORDINATION
-ACM spoke to patient to offer enrollment into Care Coordination program.  -Introduced self, reason for call, and explanation of program.   -Patient and Judy declined at this time.  -Pt was not sure and asked ACM to talk with her daughter Sterling Hicks (HIPAA). Dangelo Barros reports she lives with Pt and Pt's son lives next door.   -Judy reports Pt is occasionally home alone. When Judy leaves the house, she alerts her brother next door. Judy said Pt usually sleeps with her dog while Judy is gone. Judy said Pt had a life alert button but Pt threw it away. -Pan reports medications are cost effective.  -Judy reports Pt and Judy had Covid in Jan 2022. Both are doing well now. Pt has never had the Covid vaccine.   -Pt said she has a sore throat. Judy said Pt has gargled with warm salt water once, tried Sucret throat spray once, and is sucking on Ricola lozenges.  Judy said she is going to have her gargle with Listerine.   -Patient encouraged to contact ACM or inform PCP if she should change her mind.  -Will remove name from care team.

## 2022-06-18 ENCOUNTER — OFFICE VISIT (OUTPATIENT)
Dept: FAMILY MEDICINE CLINIC | Age: 87
End: 2022-06-18
Payer: MEDICARE

## 2022-06-18 VITALS
HEIGHT: 59 IN | WEIGHT: 182 LBS | RESPIRATION RATE: 18 BRPM | SYSTOLIC BLOOD PRESSURE: 118 MMHG | TEMPERATURE: 96.9 F | OXYGEN SATURATION: 96 % | HEART RATE: 62 BPM | DIASTOLIC BLOOD PRESSURE: 66 MMHG | BODY MASS INDEX: 36.69 KG/M2

## 2022-06-18 DIAGNOSIS — R11.0 NAUSEA: Primary | ICD-10-CM

## 2022-06-18 DIAGNOSIS — R63.8 ALTERATION IN APPETITE: ICD-10-CM

## 2022-06-18 DIAGNOSIS — F41.1 GENERALIZED ANXIETY DISORDER: ICD-10-CM

## 2022-06-18 DIAGNOSIS — R53.83 FATIGUE, UNSPECIFIED TYPE: ICD-10-CM

## 2022-06-18 DIAGNOSIS — Z92.29 HISTORY OF REGULAR MEDICATION USE: ICD-10-CM

## 2022-06-18 DIAGNOSIS — U09.9 POST COVID-19 CONDITION, UNSPECIFIED: ICD-10-CM

## 2022-06-18 DIAGNOSIS — R42 DIZZINESS: ICD-10-CM

## 2022-06-18 PROCEDURE — 1123F ACP DISCUSS/DSCN MKR DOCD: CPT | Performed by: INTERNAL MEDICINE

## 2022-06-18 PROCEDURE — 1036F TOBACCO NON-USER: CPT | Performed by: INTERNAL MEDICINE

## 2022-06-18 PROCEDURE — G8417 CALC BMI ABV UP PARAM F/U: HCPCS | Performed by: INTERNAL MEDICINE

## 2022-06-18 PROCEDURE — 1090F PRES/ABSN URINE INCON ASSESS: CPT | Performed by: INTERNAL MEDICINE

## 2022-06-18 PROCEDURE — 93000 ELECTROCARDIOGRAM COMPLETE: CPT | Performed by: INTERNAL MEDICINE

## 2022-06-18 PROCEDURE — 99214 OFFICE O/P EST MOD 30 MIN: CPT | Performed by: INTERNAL MEDICINE

## 2022-06-18 PROCEDURE — G8427 DOCREV CUR MEDS BY ELIG CLIN: HCPCS | Performed by: INTERNAL MEDICINE

## 2022-06-18 RX ORDER — MECLIZINE HYDROCHLORIDE 25 MG/1
25 TABLET ORAL 2 TIMES DAILY PRN
Qty: 15 TABLET | Refills: 0 | Status: SHIPPED | OUTPATIENT
Start: 2022-06-18 | End: 2022-06-28

## 2022-06-18 ASSESSMENT — ENCOUNTER SYMPTOMS
BLOOD IN STOOL: 0
SHORTNESS OF BREATH: 1
WHEEZING: 0
NAUSEA: 1
COUGH: 0
ABDOMINAL PAIN: 0
DIARRHEA: 0
VOMITING: 0

## 2022-06-18 NOTE — PROGRESS NOTES
[address] [place]     22  Evert Hylton : 1929 Sex: female  Age: 80 y.o. Chief Complaint   Patient presents with    Anorexia     x 3 days     Nausea     x 3 days     Shortness of Breath     x 3 days     Dizziness     x 3 days        HPI  Pleasant 60-year-old lady presents to express care today accompanied by her daughter with complaint of anorexia nausea shortness of breath, and dizziness. I had an extremely difficult time trying to drag history out of them and was very confusing. The patient went on about a multitude of other issues and I had a hard time getting them to focusing on exactly why she was there. she had COVID twice in the past few months with most recent episode about 3 weeks ago and sounds like since then she has not been \"feeling well\". This included bout of pneumonia. Despite this nausea and anorexia there is been no weight loss . There is no vomiting associated with this. Dizziness which was extremely difficult to try and interpret sounds more vertiginous than anything. no Falls. This does sound positional as well. I discussed the shortness of breath and this was played down stating that she does get a little bit winded when she is up and around but recovers well when she sits and rests and this does not sound new. No associated chest pain. .          Review of Systems   Constitutional: Positive for appetite change and fatigue. HENT: Negative. Respiratory: Positive for shortness of breath. Negative for cough and wheezing. Cardiovascular: Negative for chest pain, palpitations and leg swelling. Gastrointestinal: Positive for nausea. Negative for abdominal pain, blood in stool, diarrhea and vomiting. Endocrine: Negative. Genitourinary: Negative for dysuria, flank pain, frequency and hematuria.         Positive for urinary incontinence which has been ongoing   Musculoskeletal:        Arthritis   Skin:        Complains of oily skin   Neurological: Positive for weakness. Negative for dizziness, syncope, numbness and headaches. Psychiatric/Behavioral: The patient is nervous/anxious. REST OF PERTINENT ROS GONE OVER AND WAS NEGATIVE. Current Outpatient Medications:     meclizine (ANTIVERT) 25 MG tablet, Take 1 tablet by mouth 2 times daily as needed for Dizziness or Nausea, Disp: 15 tablet, Rfl: 0    [START ON 6/20/2022] HYDROcodone-acetaminophen (NORCO) 7.5-325 MG per tablet, Take 1 tablet by mouth every 12 hours as needed for Pain for up to 30 days. Intended supply: 30 days, Disp: 60 tablet, Rfl: 0    Cyanocobalamin 2500 MCG TABS, Daily, Disp: , Rfl:     donepezil (ARICEPT) 10 MG tablet, Take 1 tablet by mouth nightly, Disp: 90 tablet, Rfl: 1    sertraline (ZOLOFT) 25 MG tablet, Take 1 tablet by mouth daily, Disp: 90 tablet, Rfl: 1    pantoprazole (PROTONIX) 40 MG tablet, TAKE ONE TABLET BY MOUTH DAILY, Disp: 90 tablet, Rfl: 1    metoprolol succinate (TOPROL XL) 25 MG extended release tablet, Take 0.5 tablets by mouth nightly TAKE 0.5 MG DAILY, Disp: 90 tablet, Rfl: 1    clopidogrel (PLAVIX) 75 MG tablet, TAKE ONE TABLET BY MOUTH DAILY, Disp: 90 tablet, Rfl: 1    rosuvastatin (CRESTOR) 20 MG tablet, TAKE ONE TABLET BY MOUTH DAILY, Disp: 90 tablet, Rfl: 1    albuterol sulfate HFA (VENTOLIN HFA) 108 (90 Base) MCG/ACT inhaler, Inhale 2 puffs into the lungs 4 times daily as needed for Wheezing, Disp: 54 g, Rfl: 1    aspirin 325 MG tablet, Take 325 mg by mouth every morning (before breakfast), Disp: , Rfl:     HYDROcodone-acetaminophen (NORCO) 7.5-325 MG per tablet, Take 1 tablet by mouth every 12 hours as needed for Pain for up to 30 days. Intended supply: 30 days, Disp: 60 tablet, Rfl: 0    HYDROcodone-acetaminophen (NORCO) 7.5-325 MG per tablet, Take 1 tablet by mouth every 12 hours as needed for Pain for up to 30 days.  Intended supply: 30 days, Disp: 60 tablet, Rfl: 0    HYDROcodone-acetaminophen (NORCO) 7.5-325 MG per tablet, Take 1 tablet by mouth every 12 hours as needed for Pain for up to 15 days. , Disp: 30 tablet, Rfl: 0  Allergies   Allergen Reactions    Bactrim [Sulfamethoxazole-Trimethoprim]     Lyrica [Pregabalin]     Sulfa Antibiotics Rash       Past Medical History:   Diagnosis Date    Anxiety with depression     Arrhythmia     PAF    CAD (coronary artery disease)     Chronic kidney disease     stage 3    Chronic pain syndrome     Colon cancer (HCC)     History of CVA (cerebrovascular accident) without residual deficits     Hyperlipidemia     Hypertension     Osteoarthritis     Pneumonia due to infectious organism 2021    Shingles     Sleep apnea     TIA (transient ischemic attack)      Past Surgical History:   Procedure Laterality Date    BARTHOLIN GLAND CYST EXCISION      CARPAL TUNNEL RELEASE      CATARACT REMOVAL      COLECTOMY      with colostomy    DIAGNOSTIC CARDIAC CATH LAB PROCEDURE      drug eluting stent proximal RCA and diagonal branch    HYSTERECTOMY (CERVIX STATUS UNKNOWN)      LUMBAR LAMINECTOMY       Family History   Problem Relation Age of Onset    Heart Disease Mother         age Pérez Leola 1560    Colon Cancer Father         age 80    No Known Problems Brother     Cancer Brother         liver     Social History     Socioeconomic History    Marital status:       Spouse name: Not on file    Number of children: Not on file    Years of education: Not on file    Highest education level: Not on file   Occupational History    Occupation: retired various jobs/sales   Tobacco Use    Smoking status: Never Smoker    Smokeless tobacco: Never Used   Vaping Use    Vaping Use: Never used   Substance and Sexual Activity    Alcohol use: Never    Drug use: Never    Sexual activity: Not Currently     Partners: Male     Birth control/protection: Post-menopausal   Other Topics Concern    Not on file   Social History Narrative    Not on file     Social Determinants of Health Financial Resource Strain: Low Risk     Difficulty of Paying Living Expenses: Not hard at all   Food Insecurity: No Food Insecurity    Worried About Running Out of Food in the Last Year: Never true    James of Food in the Last Year: Never true   Transportation Needs:     Lack of Transportation (Medical): Not on file    Lack of Transportation (Non-Medical): Not on file   Physical Activity: Inactive    Days of Exercise per Week: 0 days    Minutes of Exercise per Session: 0 min   Stress:     Feeling of Stress : Not on file   Social Connections:     Frequency of Communication with Friends and Family: Not on file    Frequency of Social Gatherings with Friends and Family: Not on file    Attends Taoism Services: Not on file    Active Member of Clubs or Organizations: Not on file    Attends Club or Organization Meetings: Not on file    Marital Status: Not on file   Intimate Partner Violence:     Fear of Current or Ex-Partner: Not on file    Emotionally Abused: Not on file    Physically Abused: Not on file    Sexually Abused: Not on file   Housing Stability:     Unable to Pay for Housing in the Last Year: Not on file    Number of [place] in the Last Year: Not on file    Unstable Housing in the Last Year: Not on file       Vitals:    06/18/22 1103   BP: 118/66   Pulse: 62   Resp: 18   Temp: 96.9 °F (36.1 °C)   TempSrc: Temporal   SpO2: 96%   Weight: 182 lb (82.6 kg)   Height: 4' 11\" (1.499 m)       Physical Exam  Vitals and nursing note reviewed. Constitutional:       General: She is not in acute distress. Appearance: She is obese. She is not ill-appearing. HENT:      Head: Normocephalic and atraumatic. Ears:      Comments: She did have bilateral cerumen but could not visualize the TM  Cardiovascular:      Rate and Rhythm: Normal rate and regular rhythm. Heart sounds: No murmur heard.        Comments: Occasional ectopy noted  Pulmonary:      Effort: Pulmonary effort is normal. No respiratory distress. Breath sounds: Normal breath sounds. No wheezing, rhonchi or rales. Abdominal:      General: Bowel sounds are normal.      Palpations: Abdomen is soft. There is no mass. Tenderness: There is no abdominal tenderness. There is no right CVA tenderness or left CVA tenderness. Comments: Colostomy bag left abdomen   Musculoskeletal:         General: No tenderness. Cervical back: Normal range of motion and neck supple. No tenderness. Skin:     General: Skin is warm and dry. Neurological:      Mental Status: She is alert. Sensory: No sensory deficit. Motor: No weakness. Comments: With dementia however today seems alert and oriented but not focused and did repeat self several times               Assessment and Plan:  New [place] was seen today for anorexia, nausea, shortness of breath and dizziness. Diagnoses and all orders for this visit:    Nausea  -     Comprehensive Metabolic Panel; Future  -     CBC with Auto Differential; Future    Dizziness  -     Comprehensive Metabolic Panel; Future  -     CBC with Auto Differential; Future  -     EKG 12 Lead; Future  -     EKG 12 Lead    Post covid-19 condition, unspecified    Alteration in appetite    Generalized anxiety disorder    History of regular medication use  -     TSH; Future  -     Comprehensive Metabolic Panel; Future  -     CBC with Auto Differential; Future    Fatigue, unspecified type    Other orders  -     meclizine (ANTIVERT) 25 MG tablet; Take 1 tablet by mouth 2 times daily as needed for Dizziness or Nausea    Plan: Was closed today and they wish to come back on Monday to have blood work done which I have ordered today. ECG was done today which I visualized showing no acute ischemic process. Did not get a chest x-ray today as they are not in an patient and daughter do not wish to go to the hospital at this time lungs are clear when I listen to her.   She sees her PCP in a couple weeks at that time is still having issue probably should get another chest x-ray. I told him I do not have a definitive answer for them but some of this may be related to recent bouts of COVID. Did start meclizine for both vertigo and nausea and warned of potential side effects. Fall precautions. To the emergency room if any worsening. Answered all questions. Notify us of problems in the interim. Return for fu pcp. Seen By:  Alma Rosa Zamorano MD      *Document was created using voice recognition software. Note was reviewed however may contain grammatical errors.

## 2022-06-20 ENCOUNTER — TELEPHONE (OUTPATIENT)
Dept: FAMILY MEDICINE CLINIC | Age: 87
End: 2022-06-20

## 2022-06-20 DIAGNOSIS — R11.0 NAUSEA: ICD-10-CM

## 2022-06-20 DIAGNOSIS — D69.6 THROMBOCYTOPENIA (HCC): ICD-10-CM

## 2022-06-20 DIAGNOSIS — Z92.29 HISTORY OF REGULAR MEDICATION USE: ICD-10-CM

## 2022-06-20 DIAGNOSIS — R42 DIZZINESS: ICD-10-CM

## 2022-06-20 LAB
ALBUMIN SERPL-MCNC: 4.1 G/DL (ref 3.5–5.2)
ALP BLD-CCNC: 58 U/L (ref 35–104)
ALT SERPL-CCNC: 11 U/L (ref 0–32)
ANION GAP SERPL CALCULATED.3IONS-SCNC: 10 MMOL/L (ref 7–16)
AST SERPL-CCNC: 22 U/L (ref 0–31)
BASOPHILS ABSOLUTE: 0.03 E9/L (ref 0–0.2)
BASOPHILS RELATIVE PERCENT: 0.5 % (ref 0–2)
BILIRUB SERPL-MCNC: 0.4 MG/DL (ref 0–1.2)
BUN BLDV-MCNC: 16 MG/DL (ref 6–23)
CALCIUM SERPL-MCNC: 9.1 MG/DL (ref 8.6–10.2)
CHLORIDE BLD-SCNC: 106 MMOL/L (ref 98–107)
CO2: 27 MMOL/L (ref 22–29)
CREAT SERPL-MCNC: 1.3 MG/DL (ref 0.5–1)
EOSINOPHILS ABSOLUTE: 0.04 E9/L (ref 0.05–0.5)
EOSINOPHILS RELATIVE PERCENT: 0.6 % (ref 0–6)
GFR AFRICAN AMERICAN: 46
GFR NON-AFRICAN AMERICAN: 38 ML/MIN/1.73
GLUCOSE BLD-MCNC: 94 MG/DL (ref 74–99)
HCT VFR BLD CALC: 42 % (ref 34–48)
HEMOGLOBIN: 12.7 G/DL (ref 11.5–15.5)
IMMATURE GRANULOCYTES #: 0.06 E9/L
IMMATURE GRANULOCYTES %: 0.9 % (ref 0–5)
LYMPHOCYTES ABSOLUTE: 1.16 E9/L (ref 1.5–4)
LYMPHOCYTES RELATIVE PERCENT: 17.6 % (ref 20–42)
MCH RBC QN AUTO: 28.7 PG (ref 26–35)
MCHC RBC AUTO-ENTMCNC: 30.2 % (ref 32–34.5)
MCV RBC AUTO: 95 FL (ref 80–99.9)
MONOCYTES ABSOLUTE: 0.52 E9/L (ref 0.1–0.95)
MONOCYTES RELATIVE PERCENT: 7.9 % (ref 2–12)
NEUTROPHILS ABSOLUTE: 4.79 E9/L (ref 1.8–7.3)
NEUTROPHILS RELATIVE PERCENT: 72.5 % (ref 43–80)
PDW BLD-RTO: 13.6 FL (ref 11.5–15)
PLATELET # BLD: 63 E9/L (ref 130–450)
PLATELET CONFIRMATION: NORMAL
PMV BLD AUTO: 12.4 FL (ref 7–12)
POTASSIUM SERPL-SCNC: 4.3 MMOL/L (ref 3.5–5)
RBC # BLD: 4.42 E12/L (ref 3.5–5.5)
SODIUM BLD-SCNC: 143 MMOL/L (ref 132–146)
TOTAL PROTEIN: 6.6 G/DL (ref 6.4–8.3)
TSH SERPL DL<=0.05 MIU/L-ACNC: 1.64 UIU/ML (ref 0.27–4.2)
WBC # BLD: 6.6 E9/L (ref 4.5–11.5)

## 2022-06-27 ENCOUNTER — PROCEDURE VISIT (OUTPATIENT)
Dept: PODIATRY | Age: 87
End: 2022-06-27
Payer: MEDICARE

## 2022-06-27 VITALS
DIASTOLIC BLOOD PRESSURE: 64 MMHG | SYSTOLIC BLOOD PRESSURE: 122 MMHG | TEMPERATURE: 98.2 F | BODY MASS INDEX: 36.76 KG/M2 | WEIGHT: 182 LBS

## 2022-06-27 DIAGNOSIS — R26.2 DIFFICULTY WALKING: ICD-10-CM

## 2022-06-27 DIAGNOSIS — B35.1 TINEA UNGUIUM: Primary | ICD-10-CM

## 2022-06-27 DIAGNOSIS — G62.9 NEUROPATHY: ICD-10-CM

## 2022-06-27 DIAGNOSIS — M79.675 PAIN IN LEFT TOE(S): ICD-10-CM

## 2022-06-27 DIAGNOSIS — M79.674 PAIN IN TOE OF RIGHT FOOT: ICD-10-CM

## 2022-06-27 DIAGNOSIS — I73.9 PERIPHERAL VASCULAR DISEASE, UNSPECIFIED (HCC): ICD-10-CM

## 2022-06-27 PROCEDURE — 11721 DEBRIDE NAIL 6 OR MORE: CPT | Performed by: PODIATRIST

## 2022-06-27 ASSESSMENT — ENCOUNTER SYMPTOMS
EYES NEGATIVE: 1
BACK PAIN: 1

## 2022-06-27 NOTE — PROGRESS NOTES
Allen Rojas  Return Patient    Chief Complaint   Patient presents with    Toe Pain     saw pcp Dr. Obi Santos 6/18/22       Subjective: This Janet James comes to clinic for foot and nail care. Pt currently has complaint of thickened, painful, elongated nails that he/she cannot manage by themselves. Pt. Relates pain to nails with shoe gear. Pt's primary care physician is Obi Santos DO. Pt having  Pain numbness  Burning  both feet   No trauma  On gabapentin 100 tid   Past Medical History:   Diagnosis Date    Anxiety with depression     Arrhythmia     PAF    CAD (coronary artery disease)     Chronic kidney disease     stage 3    Chronic pain syndrome     Colon cancer ([facility] [address]     History of CVA (cerebrovascular accident) without residual deficits     Hyperlipidemia     Hypertension     Osteoarthritis     Pneumonia due to infectious organism 12/1/2021    Shingles     Sleep apnea     TIA (transient ischemic attack)        Allergies   Allergen Reactions    Bactrim [Sulfamethoxazole-Trimethoprim]     Lyrica [Pregabalin]     Sulfa Antibiotics Rash     Current Outpatient Medications on File Prior to Visit   Medication Sig Dispense Refill    meclizine (ANTIVERT) 25 MG tablet Take 1 tablet by mouth 2 times daily as needed for Dizziness or Nausea 15 tablet 0    HYDROcodone-acetaminophen (NORCO) 7.5-325 MG per tablet Take 1 tablet by mouth every 12 hours as needed for Pain for up to 30 days.  Intended supply: 30 days 60 tablet 0    Cyanocobalamin 2500 MCG TABS Daily      donepezil (ARICEPT) 10 MG tablet Take 1 tablet by mouth nightly 90 tablet 1    sertraline (ZOLOFT) 25 MG tablet Take 1 tablet by mouth daily 90 tablet 1    pantoprazole (PROTONIX) 40 MG tablet TAKE ONE TABLET BY MOUTH DAILY 90 tablet 1    metoprolol succinate (TOPROL XL) 25 MG extended release tablet Take 0.5 tablets by mouth nightly TAKE 0.5 MG DAILY 90 tablet 1    clopidogrel (PLAVIX) 75 MG tablet TAKE ONE TABLET BY MOUTH DAILY 90 tablet 1    rosuvastatin (CRESTOR) 20 MG tablet TAKE ONE TABLET BY MOUTH DAILY 90 tablet 1    albuterol sulfate HFA (VENTOLIN HFA) 108 (90 Base) MCG/ACT inhaler Inhale 2 puffs into the lungs 4 times daily as needed for Wheezing 54 g 1    aspirin 325 MG tablet Take 325 mg by mouth every morning (before breakfast)      HYDROcodone-acetaminophen (NORCO) 7.5-325 MG per tablet Take 1 tablet by mouth every 12 hours as needed for Pain for up to 30 days. Intended supply: 30 days 60 tablet 0    HYDROcodone-acetaminophen (NORCO) 7.5-325 MG per tablet Take 1 tablet by mouth every 12 hours as needed for Pain for up to 30 days. Intended supply: 30 days 60 tablet 0    HYDROcodone-acetaminophen (NORCO) 7.5-325 MG per tablet Take 1 tablet by mouth every 12 hours as needed for Pain for up to 15 days. 30 tablet 0     No current facility-administered medications on file prior to visit. Review of Systems   Constitutional: Negative. HENT: Negative. Eyes: Negative. Endocrine: Negative. Genitourinary: Negative. Musculoskeletal: Positive for arthralgias, back pain, gait problem and joint swelling. Objective:  General: AAO x 3 in NAD.     Derm  Toenail Description  Sites of Onychomycosis Involvement (Check affected area)  [x] [x] [x] [x] [x] [x] [x] [x] [x] [x]  5 4 3 2 1 1 2 3 4 5                          Right                                        Left    Thickness  [x] [x] [x] [x] [x] [x] [x] [x] [x] [x]  5 4 3 2 1 1 2 3 4 5                         Right                                        Left    Dystrophic Changes   [x] [x] [x] [x] [x] [x] [x] [x] [x] [x]  5 4 3 2 1 1 2 3 4 5                         Right                                        Left    Color  [x] [x] [x] [x] [x] [x] [x] [x] [x] [x]  5 4 3 2 1 1 2 3 4 5                          Right                                        Left    Incurvation/Ingrowin   [x] [x] [x] [x] [x] [x] [x] [x] [x] [x]  5 4 3 2 1 1 2 3 4 5 Right                                        Left    Inflammation/Pain   [x] [x] [x] [x] [x] [x] [x] [x] [x] [x]  5 4 3  2 1 1 2 3 4 5                         Right                                        Left      Nails that are described above are all elongated thickened pitting mycotic yellowish incurvated causing pain with both shoe gear. Palpation1-5  Bilateral     Dermatologic Exam:hair loss noted  lower extremity    Skin lesion/ulceration   Skin   Callus neg  Musculoskeletal:     1st MPJ ROM normal, Bilateral.  Muscle strength 5/5, Bilateral.  Pain present upon palpation of toenails 1-5  Bilateral., Bilateral.  Ankle ROM normal,Bilateral.    Dorsally contracted digits, Bilateral.     Vascular:  Pulses   bilateral DP absent    PT absent    Neurological:  Sensation present to light touch to level of digits, Bilateral.    Foot Exam    General  General Appearance: appears stated age and healthy   Orientation: alert and oriented to person, place, and time   Assistance: walker use and wheelchair use       Right Foot/Ankle     Inspection and Palpation  Tenderness: great toe metatarsophalangeal joint and metatarsals   Hallux valgus: yes  Hallux limitus: yes  Skin Exam: abnormal color; Neurovascular  Dorsalis pedis: absent  Posterior tibial: absent  Saphenous nerve sensation: diminished  Tibial nerve sensation: diminished  Superficial peroneal nerve sensation: diminished  Deep peroneal nerve sensation: diminished  Sural nerve sensation: diminished      Left Foot/Ankle      Inspection and Palpation  Tenderness: metatarsals   Hallux valgus: yes  Skin Exam: abnormal color;      Neurovascular  Dorsalis pedis: absent  Posterior tibial: absent  Saphenous nerve sensation: diminished  Tibial nerve sensation: diminished  Superficial peroneal nerve sensation: diminished  Deep peroneal nerve sensation: diminished  Sural nerve sensation: diminished             Ortho Exam  Q7   []Yes    []No                Q8 [x]Yes    []No                     Q9   []Yes    []No  Assessment:  80 y.o. female with:   1. Tinea unguium    2. Peripheral vascular disease, unspecified ([address]    3. Pain in toe of right foot    4. Pain in left toe(s)    5. Difficulty walking    6. Neuropathy     No orders of the defined types were placed in this encounter. Plan:  Pt seen for  chronic pain   oarrs report reviewed I have reviewed current medication  and prior noted I believe the need for refill is warranted  I have reviewed oarrs report and found no drug seeking behavior   I have discussed all side effects and narcotic policy   with pt  pt understands and agrees with treatment  a follow up  appointment is made    Pt was evaluated and examined. Patient was given personalized discharge instructions. Nails 1-10 were debrided in length and thickness sharply with a nail nipper and  without incident. Pt will follow up in 9 weeks or sooner if any problems arise. Diagnosis was discussed with the pt and all of their questions were answered in detail. Proper foot hygiene and care was discussed with the pt. Patient to check feet daily and contact the office with any questions/problems/concerns. Other comorbidity noted and will be managed by PCP. Pain waiver discussed with patient and confirmed.  Refilled gabapentin   6/27/2022      Electronically signed by Yee Brown DPM on 6/27/2022 at 3:01 PM  6/27/2022

## 2022-07-14 ENCOUNTER — OFFICE VISIT (OUTPATIENT)
Dept: PRIMARY CARE CLINIC | Age: 87
End: 2022-07-14
Payer: MEDICARE

## 2022-07-14 VITALS
BODY MASS INDEX: 34.27 KG/M2 | SYSTOLIC BLOOD PRESSURE: 132 MMHG | HEART RATE: 67 BPM | DIASTOLIC BLOOD PRESSURE: 60 MMHG | TEMPERATURE: 98.1 F | WEIGHT: 170 LBS | OXYGEN SATURATION: 99 % | HEIGHT: 59 IN

## 2022-07-14 DIAGNOSIS — G30.0 EARLY ONSET ALZHEIMER'S DISEASE WITH BEHAVIORAL DISTURBANCE (HCC): ICD-10-CM

## 2022-07-14 DIAGNOSIS — E78.2 MIXED HYPERLIPIDEMIA: ICD-10-CM

## 2022-07-14 DIAGNOSIS — N18.32 CHRONIC RENAL IMPAIRMENT, STAGE 3B (HCC): ICD-10-CM

## 2022-07-14 DIAGNOSIS — I10 ESSENTIAL HYPERTENSION: Primary | ICD-10-CM

## 2022-07-14 DIAGNOSIS — I25.10 CORONARY ARTERY DISEASE INVOLVING NATIVE CORONARY ARTERY OF NATIVE HEART WITHOUT ANGINA PECTORIS: ICD-10-CM

## 2022-07-14 DIAGNOSIS — F02.818 EARLY ONSET ALZHEIMER'S DISEASE WITH BEHAVIORAL DISTURBANCE (HCC): ICD-10-CM

## 2022-07-14 DIAGNOSIS — F41.1 GENERALIZED ANXIETY DISORDER: ICD-10-CM

## 2022-07-14 DIAGNOSIS — M15.9 PRIMARY OSTEOARTHRITIS INVOLVING MULTIPLE JOINTS: ICD-10-CM

## 2022-07-14 DIAGNOSIS — D69.6 THROMBOCYTOPENIA, UNSPECIFIED (HCC): ICD-10-CM

## 2022-07-14 DIAGNOSIS — E66.01 MORBIDLY OBESE (HCC): ICD-10-CM

## 2022-07-14 DIAGNOSIS — Z91.81 AT HIGH RISK FOR FALLS: ICD-10-CM

## 2022-07-14 DIAGNOSIS — K21.9 GASTROESOPHAGEAL REFLUX DISEASE WITHOUT ESOPHAGITIS: ICD-10-CM

## 2022-07-14 PROBLEM — F41.8 ANXIETY WITH DEPRESSION: Status: RESOLVED | Noted: 2019-10-18 | Resolved: 2022-07-14

## 2022-07-14 PROCEDURE — G8427 DOCREV CUR MEDS BY ELIG CLIN: HCPCS | Performed by: FAMILY MEDICINE

## 2022-07-14 PROCEDURE — 1123F ACP DISCUSS/DSCN MKR DOCD: CPT | Performed by: FAMILY MEDICINE

## 2022-07-14 PROCEDURE — G8417 CALC BMI ABV UP PARAM F/U: HCPCS | Performed by: FAMILY MEDICINE

## 2022-07-14 PROCEDURE — 1036F TOBACCO NON-USER: CPT | Performed by: FAMILY MEDICINE

## 2022-07-14 PROCEDURE — 99214 OFFICE O/P EST MOD 30 MIN: CPT | Performed by: FAMILY MEDICINE

## 2022-07-14 PROCEDURE — 1090F PRES/ABSN URINE INCON ASSESS: CPT | Performed by: FAMILY MEDICINE

## 2022-07-14 RX ORDER — HYDROCODONE BITARTRATE AND ACETAMINOPHEN 7.5; 325 MG/1; MG/1
1 TABLET ORAL EVERY 12 HOURS PRN
Qty: 60 TABLET | Refills: 0 | Status: SHIPPED
Start: 2022-09-08 | End: 2022-10-18 | Stop reason: SDUPTHER

## 2022-07-14 RX ORDER — SERTRALINE HYDROCHLORIDE 25 MG/1
25 TABLET, FILM COATED ORAL DAILY
Qty: 90 TABLET | Refills: 1 | Status: SHIPPED | OUTPATIENT
Start: 2022-07-14

## 2022-07-14 RX ORDER — HYDROCODONE BITARTRATE AND ACETAMINOPHEN 7.5; 325 MG/1; MG/1
1 TABLET ORAL EVERY 12 HOURS PRN
Qty: 30 TABLET | Refills: 0 | Status: SHIPPED
Start: 2022-07-14 | End: 2022-10-18 | Stop reason: SDUPTHER

## 2022-07-14 RX ORDER — HYDROCODONE BITARTRATE AND ACETAMINOPHEN 7.5; 325 MG/1; MG/1
1 TABLET ORAL EVERY 12 HOURS PRN
Qty: 60 TABLET | Refills: 0 | Status: SHIPPED
Start: 2022-08-11 | End: 2022-10-18 | Stop reason: SDUPTHER

## 2022-07-14 ASSESSMENT — ENCOUNTER SYMPTOMS
BACK PAIN: 1
SHORTNESS OF BREATH: 0
ABDOMINAL PAIN: 0
VOMITING: 0
COUGH: 0
DIARRHEA: 0
WHEEZING: 0
NAUSEA: 0
CONSTIPATION: 0

## 2022-07-14 NOTE — PROGRESS NOTES
22  [address] : 1929 Sex: female  Age: 80 y.o. Chief Complaint   Patient presents with    Medication Refill     HPI:  80 y.o. female presents today for 3 month follow up of chronic medical conditions, medication refills and FBW . Patient's chart, medical, surgical and medication history all reviewed. Chronic Pain  Patient has long standing history of chronic back pain with numbness and tingling in her feet. Has had long standing lumbar disc disease with past laminectomy. She has been on Norco for many years. Compliant with follow ups. Noting that she is having more pain in the lateral hips and into her legs. Does not walk much anymore- mostly sedentary. States that the neuropathy in her feet is the worst part. Now having \"twitching\" of her upper extremities. Coronary Artery Disease   Patient presents for routine coronary artery disease follow-up. Cardiac risk factors include advanced age (older than 54 for men, 72 for women), dyslipidemia, hypertension, obesity (BMI >= 30 kg/m2) and sedentary lifestyle. Patient has a history of stent placement years ago. She is  on ASA/Statin/BB/ACEi.     ROS:  Review of Systems   Constitutional: Negative for chills, fatigue and fever. Respiratory: Negative for cough, shortness of breath and wheezing. Cardiovascular: Negative for chest pain and palpitations. Gastrointestinal: Negative for abdominal pain, constipation, diarrhea, nausea and vomiting. Genitourinary: Positive for frequency (incontinence). Musculoskeletal: Positive for arthralgias, back pain and gait problem. Skin: Negative for rash. Neurological: Positive for weakness and numbness. Negative for dizziness and headaches. Psychiatric/Behavioral: Negative for dysphoric mood. The patient is not nervous/anxious. All other systems reviewed and are negative.        Current Outpatient Medications on File Prior to Visit   Medication Sig Dispense Refill    children: Not on file    Years of education: Not on file    Highest education level: Not on file   Occupational History    Occupation: retired various jobs/sales   Tobacco Use    Smoking status: Never Smoker    Smokeless tobacco: Never Used   Vaping Use    Vaping Use: Never used   Substance and Sexual Activity    Alcohol use: Never    Drug use: Never    Sexual activity: Not Currently     Partners: Male     Birth control/protection: Post-menopausal   Other Topics Concern    Not on file   Social History Narrative    Not on file     Social Determinants of Health     Financial Resource Strain: Low Risk     Difficulty of Paying Living Expenses: Not hard at all   Food Insecurity: No Food Insecurity    Worried About [address] in the Last Year: Never true    [address] [place] [employer] in the Last Year: Never true   Transportation Needs:     Lack of Transportation (Medical): Not on file    Lack of Transportation (Non-Medical):  Not on file   Physical Activity: Inactive    Days of Exercise per Week: 0 days    Minutes of Exercise per Session: 0 min   Stress:     Feeling of Stress : Not on file   Social Connections:     Frequency of Communication with Friends and Family: Not on file    Frequency of Social Gatherings with Friends and Family: Not on file    Attends Voodoo Services: Not on file    Active Member of Clubs or Organizations: Not on file    Attends Club or Organization Meetings: Not on file    Marital Status: Not on file   Intimate Partner Violence:     Fear of Current or Ex-Partner: Not on file    Emotionally Abused: Not on file    Physically Abused: Not on file    Sexually Abused: Not on file   Housing Stability:     Unable to Pay for Housing in the Last Year: Not on file    Number of [place] in the Last Year: Not on file    Unstable Housing in the Last Year: Not on file       Vitals:    07/14/22 1553   BP: 132/60   Pulse: 67   Temp: 98.1 °F (36.7 °C)   SpO2: 99%   Weight: 170 lb (77.1 kg)   Height: 4' 11\" (1.499 m)       Physical Exam:  Physical Exam  Vitals and nursing note reviewed. Constitutional:       General: She is not in acute distress. Appearance: Normal appearance. She is well-developed. She is obese. She is not ill-appearing. HENT:      Head: Normocephalic and atraumatic. Right Ear: Hearing and external ear normal.      Left Ear: Hearing and external ear normal.      Nose:      Comments: Wearing mask  Eyes:      General: Lids are normal. No scleral icterus. Extraocular Movements: Extraocular movements intact. Conjunctiva/sclera: Conjunctivae normal.   Neck:      Thyroid: No thyromegaly. Cardiovascular:      Rate and Rhythm: Normal rate and regular rhythm. Heart sounds: Normal heart sounds. No murmur heard. Pulmonary:      Effort: Pulmonary effort is normal. No respiratory distress. Breath sounds: Normal breath sounds. No wheezing. Musculoskeletal:         General: No tenderness or deformity. Cervical back: Normal range of motion and neck supple. Right lower leg: No edema. Left lower leg: No edema. Lymphadenopathy:      Cervical: No cervical adenopathy. Skin:     General: Skin is warm and dry. Findings: No rash. Neurological:      General: No focal deficit present. Mental Status: She is alert and oriented to person, place, and time. Gait: Gait abnormal (patient seated in wheelchair). Psychiatric:         Mood and Affect: Mood and affect normal.         Speech: Speech normal.         Behavior: Behavior normal.         Thought Content:  Thought content normal.         Labs:  CBC with Differential:    Lab Results   Component Value Date/Time    WBC 6.6 06/20/2022 02:52 PM    RBC 4.42 06/20/2022 02:52 PM    HGB 12.7 06/20/2022 02:52 PM    HCT 42.0 06/20/2022 02:52 PM    PLT 63 06/20/2022 02:52 PM    MCV 95.0 06/20/2022 02:52 PM    MCH 28.7 06/20/2022 02:52 PM    MCHC 30.2 06/20/2022 02:52 PM    RDW 13.6 06/20/2022 02:52 PM    SEGSPCT 84.6 11/30/2021 04:16 AM    BANDSPCT 4.0 01/26/2022 04:45 PM    LYMPHOPCT 17.6 06/20/2022 02:52 PM    MONOPCT 7.9 06/20/2022 02:52 PM    BASOPCT 0.5 06/20/2022 02:52 PM    MONOSABS 0.52 06/20/2022 02:52 PM    LYMPHSABS 1.16 06/20/2022 02:52 PM    EOSABS 0.04 06/20/2022 02:52 PM    BASOSABS 0.03 06/20/2022 02:52 PM     CMP:    Lab Results   Component Value Date/Time     06/20/2022 02:52 PM    K 4.3 06/20/2022 02:52 PM     06/20/2022 02:52 PM    CO2 27 06/20/2022 02:52 PM    BUN 16 06/20/2022 02:52 PM    CREATININE 1.3 06/20/2022 02:52 PM    CREATININE 1.3 12/14/2018 12:00 AM    GFRAA 46 06/20/2022 02:52 PM    AGRATIO 1.2 01/26/2022 04:45 PM    LABGLOM 38 06/20/2022 02:52 PM    GLUCOSE 94 06/20/2022 02:52 PM    PROT 6.6 06/20/2022 02:52 PM    LABALBU 4.1 06/20/2022 02:52 PM    CALCIUM 9.1 06/20/2022 02:52 PM    BILITOT 0.4 06/20/2022 02:52 PM    ALKPHOS 58 06/20/2022 02:52 PM    AST 22 06/20/2022 02:52 PM    ALT 11 06/20/2022 02:52 PM     Uric Acid:  No results found for: Jose Francisco Plummer  Microalbumen/Creatinine ratio:  No components found for: RUCREAT  FLP:    Lab Results   Component Value Date/Time    TRIG 153 04/04/2022 02:41 PM    HDL 49 04/04/2022 02:41 PM    LDLCALC 48 04/04/2022 02:41 PM    LABVLDL 31 04/04/2022 02:41 PM     TSH:    Lab Results   Component Value Date/Time    TSH 1.640 06/20/2022 02:52 PM          Assessment and Plan:  Neftaly Bautista was seen today for medication refill. Diagnoses and all orders for this visit:    Essential hypertension  -     CBC with Auto Differential; Future  -     Comprehensive Metabolic Panel; Future  -     Lipid Panel; Future  -     TSH; Future  -     Vitamin D 25 Hydroxy; Future  -     Urinalysis; Future  -     Uric Acid; Future  -     Magnesium; Future  Well controlled. Repeat labs in 3 months. Mixed hyperlipidemia  -     Lipid Panel; Future  Patient would like to try and stop medications. To take less.   Discussed that at almost 81 yo it is likely OK to stop statin (despite history) as long as they understand the risks. Patient and daughter both agree. May help reduce muscle and joint aches. Chronic renal impairment, stage 3b (HCC)  -     CBC with Auto Differential; Future  -     Comprehensive Metabolic Panel; Future  -     Lipid Panel; Future  -     TSH; Future  -     Vitamin D 25 Hydroxy; Future  -     Urinalysis; Future  -     Uric Acid; Future  -     Magnesium; Future  Stable    Coronary artery disease involving native coronary artery of native heart without angina pectoris  Stop Plavix. Continue full dose ASA. Primary osteoarthritis involving multiple joints  -     HYDROcodone-acetaminophen (NORCO) 7.5-325 MG per tablet; Take 1 tablet by mouth every 12 hours as needed for Pain for up to 30 days. Intended supply: 30 days  -     HYDROcodone-acetaminophen (NORCO) 7.5-325 MG per tablet; Take 1 tablet by mouth every 12 hours as needed for Pain for up to 30 days. Intended supply: 30 days  -     HYDROcodone-acetaminophen (NORCO) 7.5-325 MG per tablet; Take 1 tablet by mouth every 12 hours as needed for Pain for up to 15 days. 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 Not listed on her allergies. When she went to get at the pharmacy, the pharmacist stated that it was on her allergy list at the pharmacy. Daughter has no idea what the allergy is or why she took it in the past.  Platelets remain the same at 72.      Morbidly obese (HCC)    At high risk for falls  On the basis of positive falls risk screening, assessment and plan is as follows: patient declines any further evaluation/treatment for increased falls risk. Return in about 3 months (around 10/14/2022), or if symptoms worsen or fail to improve, for Chronic medical conditions.       Seen By:  Kaylan Montalvo, DO

## 2022-09-26 ENCOUNTER — PROCEDURE VISIT (OUTPATIENT)
Dept: PODIATRY | Age: 87
End: 2022-09-26
Payer: MEDICARE

## 2022-09-26 VITALS
BODY MASS INDEX: 34.34 KG/M2 | SYSTOLIC BLOOD PRESSURE: 126 MMHG | WEIGHT: 170 LBS | DIASTOLIC BLOOD PRESSURE: 79 MMHG | TEMPERATURE: 97.2 F

## 2022-09-26 DIAGNOSIS — B35.1 TINEA UNGUIUM: Primary | ICD-10-CM

## 2022-09-26 DIAGNOSIS — R26.2 DIFFICULTY WALKING: ICD-10-CM

## 2022-09-26 DIAGNOSIS — M79.675 PAIN IN LEFT TOE(S): ICD-10-CM

## 2022-09-26 DIAGNOSIS — I73.9 PERIPHERAL VASCULAR DISEASE, UNSPECIFIED (HCC): ICD-10-CM

## 2022-09-26 DIAGNOSIS — M79.674 PAIN IN TOE OF RIGHT FOOT: ICD-10-CM

## 2022-09-26 PROCEDURE — 11721 DEBRIDE NAIL 6 OR MORE: CPT | Performed by: PODIATRIST

## 2022-09-26 ASSESSMENT — ENCOUNTER SYMPTOMS
EYES NEGATIVE: 1
BACK PAIN: 1

## 2022-09-26 NOTE — PROGRESS NOTES
Keith Walker  Return Patient    Chief Complaint   Patient presents with    Toe Pain     Saw pcp Dr. Regi Garg 7/14/22       Subjective: This Vinayak Lee comes to clinic for foot and nail care. Pt currently has complaint of thickened, painful, elongated nails that he/she cannot manage by themselves. Pt. Relates pain to nails with shoe gear. Pt's primary care physician is Regi Garg DO. Pt having  Pain numbness  Burning  both feet   No trauma  On gabapentin 100 tid   Past Medical History:   Diagnosis Date    Anxiety with depression     Arrhythmia     PAF    CAD (coronary artery disease)     Chronic kidney disease     stage 3    Chronic pain syndrome     Colon cancer (HCC)     History of CVA (cerebrovascular accident) without residual deficits     Hyperlipidemia     Hypertension     Osteoarthritis     Pneumonia due to infectious organism 12/1/2021    Shingles     Sleep apnea     TIA (transient ischemic attack)        Allergies   Allergen Reactions    Bactrim [Sulfamethoxazole-Trimethoprim]     Lyrica [Pregabalin]     Prednisone     Sulfa Antibiotics Rash     Current Outpatient Medications on File Prior to Visit   Medication Sig Dispense Refill    sertraline (ZOLOFT) 25 MG tablet Take 1 tablet by mouth daily 90 tablet 1    HYDROcodone-acetaminophen (NORCO) 7.5-325 MG per tablet Take 1 tablet by mouth every 12 hours as needed for Pain for up to 30 days.  Intended supply: 30 days 60 tablet 0    Cyanocobalamin 2500 MCG TABS Daily      donepezil (ARICEPT) 10 MG tablet Take 1 tablet by mouth nightly 90 tablet 1    pantoprazole (PROTONIX) 40 MG tablet TAKE ONE TABLET BY MOUTH DAILY 90 tablet 1    albuterol sulfate HFA (VENTOLIN HFA) 108 (90 Base) MCG/ACT inhaler Inhale 2 puffs into the lungs 4 times daily as needed for Wheezing 54 g 1    aspirin 325 MG tablet Take 325 mg by mouth every morning (before breakfast)      HYDROcodone-acetaminophen (NORCO) 7.5-325 MG per tablet Take 1 tablet by mouth every 12 hours as needed for Pain for up to 30 days. Intended supply: 30 days 60 tablet 0    HYDROcodone-acetaminophen (NORCO) 7.5-325 MG per tablet Take 1 tablet by mouth every 12 hours as needed for Pain for up to 15 days. 30 tablet 0    HYDROcodone-acetaminophen (NORCO) 7.5-325 MG per tablet Take 1 tablet by mouth every 12 hours as needed for Pain for up to 30 days. Intended supply: 30 days 60 tablet 0     No current facility-administered medications on file prior to visit. Review of Systems   Constitutional: Negative. HENT: Negative. Eyes: Negative. Endocrine: Negative. Genitourinary: Negative. Musculoskeletal:  Positive for arthralgias, back pain, gait problem and joint swelling. Objective:  General: AAO x 3 in NAD. Derm  Toenail Description  Sites of Onychomycosis Involvement (Check affected area)  [x] [x] [x] [x] [x] [x] [x] [x] [x] [x]  5 4 3 2 1 1 2 3 4 5                          Right                                        Left    Thickness  [x] [x] [x] [x] [x] [x] [x] [x] [x] [x]  5 4 3 2 1 1 2 3 4 5                         Right                                        Left    Dystrophic Changes   [x] [x] [x] [x] [x] [x] [x] [x] [x] [x]  5 4 3 2 1 1 2 3 4 5                         Right                                        Left    Color  [x] [x] [x] [x] [x] [x] [x] [x] [x] [x]  5 4 3 2 1 1 2 3 4 5                          Right                                        Left    Incurvation/Ingrowin   [x] [x] [x] [x] [x] [x] [x] [x] [x] [x]  5 4 3 2 1 1 2 3 4 5                         Right                                        Left    Inflammation/Pain   [x] [x] [x] [x] [x] [x] [x] [x] [x] [x]  5 4 3  2 1 1 2 3 4 5                         Right                                        Left      Nails that are described above are all elongated thickened pitting mycotic yellowish incurvated causing pain with both shoe gear.  Palpation1-5  Bilateral     Dermatologic Exam:hair loss noted  lower extremity    Skin lesion/ulceration   Skin   Callus neg  Musculoskeletal:     1st MPJ ROM normal, Bilateral.  Muscle strength 5/5, Bilateral.  Pain present upon palpation of toenails 1-5  Bilateral., Bilateral.  Ankle ROM normal,Bilateral.    Dorsally contracted digits, Bilateral.     Vascular:  Pulses   bilateral DP absent    PT absent    Neurological:  Sensation present to light touch to level of digits, Bilateral.    Foot Exam    General  General Appearance: appears stated age and healthy   Orientation: alert and oriented to person, place, and time   Assistance: walker use and wheelchair use       Right Foot/Ankle     Inspection and Palpation  Tenderness: great toe metatarsophalangeal joint and metatarsals   Hallux valgus: yes  Hallux limitus: yes  Skin Exam: abnormal color; Neurovascular  Dorsalis pedis: absent  Posterior tibial: absent  Saphenous nerve sensation: diminished  Tibial nerve sensation: diminished  Superficial peroneal nerve sensation: diminished  Deep peroneal nerve sensation: diminished  Sural nerve sensation: diminished      Left Foot/Ankle      Inspection and Palpation  Tenderness: metatarsals   Hallux valgus: yes  Skin Exam: abnormal color; Neurovascular  Dorsalis pedis: absent  Posterior tibial: absent  Saphenous nerve sensation: diminished  Tibial nerve sensation: diminished  Superficial peroneal nerve sensation: diminished  Deep peroneal nerve sensation: diminished  Sural nerve sensation: diminished         Ortho Exam  Q7   []Yes    []No                Q8   [x]Yes    []No                     Q9   []Yes    []No  Assessment:  80 y.o. female with:   1. Tinea unguium    2. Peripheral vascular disease, unspecified ([facility] [address]    3. Pain in toe of right foot    4. Pain in left toe(s)    5. Difficulty walking     No orders of the defined types were placed in this encounter.         Plan:  Pt seen for  chronic pain   oarrs report reviewed I have reviewed current medication  and prior noted I believe the need for refill is warranted  I have reviewed oarrs report and found no drug seeking behavior   I have discussed all side effects and narcotic policy   with pt  pt understands and agrees with treatment  a follow up  appointment is made    Pt was evaluated and examined. Patient was given personalized discharge instructions. Nails 1-10 were debrided in length and thickness sharply with a nail nipper and  without incident. Pt will follow up in 9 weeks or sooner if any problems arise. Diagnosis was discussed with the pt and all of their questions were answered in detail. Proper foot hygiene and care was discussed with the pt. Patient to check feet daily and contact the office with any questions/problems/concerns. Other comorbidity noted and will be managed by PCP. Pain waiver discussed with patient and confirmed.  Refilled gabapentin   9/26/2022      Electronically signed by Rafat Green DPM on 9/26/2022 at 3:11 PM  9/26/2022

## 2022-10-14 DIAGNOSIS — N18.32 CHRONIC RENAL IMPAIRMENT, STAGE 3B (HCC): ICD-10-CM

## 2022-10-14 DIAGNOSIS — I10 ESSENTIAL HYPERTENSION: ICD-10-CM

## 2022-10-14 DIAGNOSIS — E78.2 MIXED HYPERLIPIDEMIA: ICD-10-CM

## 2022-10-14 LAB
ALBUMIN SERPL-MCNC: 3.8 G/DL (ref 3.5–5.2)
ALP BLD-CCNC: 60 U/L (ref 35–104)
ALT SERPL-CCNC: 8 U/L (ref 0–32)
ANION GAP SERPL CALCULATED.3IONS-SCNC: 10 MMOL/L (ref 7–16)
AST SERPL-CCNC: 17 U/L (ref 0–31)
BACTERIA: ABNORMAL /HPF
BASOPHILS ABSOLUTE: 0.02 E9/L (ref 0–0.2)
BASOPHILS RELATIVE PERCENT: 0.3 % (ref 0–2)
BILIRUB SERPL-MCNC: 0.4 MG/DL (ref 0–1.2)
BILIRUBIN URINE: NEGATIVE
BLOOD, URINE: NEGATIVE
BUN BLDV-MCNC: 18 MG/DL (ref 6–23)
CALCIUM SERPL-MCNC: 9.2 MG/DL (ref 8.6–10.2)
CHLORIDE BLD-SCNC: 104 MMOL/L (ref 98–107)
CHOLESTEROL, TOTAL: 142 MG/DL (ref 0–199)
CLARITY: CLEAR
CO2: 24 MMOL/L (ref 22–29)
COLOR: YELLOW
CREAT SERPL-MCNC: 1.2 MG/DL (ref 0.5–1)
EOSINOPHILS ABSOLUTE: 0.06 E9/L (ref 0.05–0.5)
EOSINOPHILS RELATIVE PERCENT: 0.8 % (ref 0–6)
EPITHELIAL CELLS, UA: ABNORMAL /HPF
GFR AFRICAN AMERICAN: 51
GFR NON-AFRICAN AMERICAN: 42 ML/MIN/1.73
GLUCOSE BLD-MCNC: 111 MG/DL (ref 74–99)
GLUCOSE URINE: NEGATIVE MG/DL
HCT VFR BLD CALC: 39.4 % (ref 34–48)
HDLC SERPL-MCNC: 44 MG/DL
HEMOGLOBIN: 12.6 G/DL (ref 11.5–15.5)
HYALINE CASTS: ABNORMAL /LPF (ref 0–2)
IMMATURE GRANULOCYTES #: 0.05 E9/L
IMMATURE GRANULOCYTES %: 0.7 % (ref 0–5)
KETONES, URINE: ABNORMAL MG/DL
LDL CHOLESTEROL CALCULATED: 69 MG/DL (ref 0–99)
LEUKOCYTE ESTERASE, URINE: ABNORMAL
LYMPHOCYTES ABSOLUTE: 0.85 E9/L (ref 1.5–4)
LYMPHOCYTES RELATIVE PERCENT: 11.9 % (ref 20–42)
MAGNESIUM: 1.9 MG/DL (ref 1.6–2.6)
MCH RBC QN AUTO: 30.7 PG (ref 26–35)
MCHC RBC AUTO-ENTMCNC: 32 % (ref 32–34.5)
MCV RBC AUTO: 96.1 FL (ref 80–99.9)
MONOCYTES ABSOLUTE: 0.52 E9/L (ref 0.1–0.95)
MONOCYTES RELATIVE PERCENT: 7.3 % (ref 2–12)
MUCUS: PRESENT /LPF
NEUTROPHILS ABSOLUTE: 5.65 E9/L (ref 1.8–7.3)
NEUTROPHILS RELATIVE PERCENT: 79 % (ref 43–80)
NITRITE, URINE: NEGATIVE
PDW BLD-RTO: 13.8 FL (ref 11.5–15)
PH UA: 6 (ref 5–9)
PLATELET # BLD: 64 E9/L (ref 130–450)
PLATELET CONFIRMATION: NORMAL
PMV BLD AUTO: 12.1 FL (ref 7–12)
POTASSIUM SERPL-SCNC: 4.4 MMOL/L (ref 3.5–5)
PROTEIN UA: ABNORMAL MG/DL
RBC # BLD: 4.1 E12/L (ref 3.5–5.5)
RBC UA: ABNORMAL /HPF (ref 0–2)
SODIUM BLD-SCNC: 138 MMOL/L (ref 132–146)
SPECIFIC GRAVITY UA: 1.02 (ref 1–1.03)
TOTAL PROTEIN: 6.6 G/DL (ref 6.4–8.3)
TRIGL SERPL-MCNC: 145 MG/DL (ref 0–149)
TSH SERPL DL<=0.05 MIU/L-ACNC: 2.36 UIU/ML (ref 0.27–4.2)
URIC ACID, SERUM: 4.7 MG/DL (ref 2.4–5.7)
UROBILINOGEN, URINE: 1 E.U./DL
VITAMIN D 25-HYDROXY: 60 NG/ML (ref 30–100)
VLDLC SERPL CALC-MCNC: 29 MG/DL
WBC # BLD: 7.2 E9/L (ref 4.5–11.5)
WBC UA: ABNORMAL /HPF (ref 0–5)

## 2022-10-18 ENCOUNTER — OFFICE VISIT (OUTPATIENT)
Dept: PRIMARY CARE CLINIC | Age: 87
End: 2022-10-18
Payer: MEDICARE

## 2022-10-18 VITALS
DIASTOLIC BLOOD PRESSURE: 70 MMHG | HEART RATE: 67 BPM | SYSTOLIC BLOOD PRESSURE: 110 MMHG | OXYGEN SATURATION: 95 % | TEMPERATURE: 97.6 F

## 2022-10-18 DIAGNOSIS — Z23 NEED FOR INFLUENZA VACCINATION: ICD-10-CM

## 2022-10-18 DIAGNOSIS — Z86.73 HISTORY OF STROKE WITHOUT RESIDUAL DEFICITS: ICD-10-CM

## 2022-10-18 DIAGNOSIS — N18.32 CHRONIC RENAL IMPAIRMENT, STAGE 3B (HCC): ICD-10-CM

## 2022-10-18 DIAGNOSIS — D69.6 THROMBOCYTOPENIA, UNSPECIFIED (HCC): ICD-10-CM

## 2022-10-18 DIAGNOSIS — K21.9 GASTROESOPHAGEAL REFLUX DISEASE WITHOUT ESOPHAGITIS: ICD-10-CM

## 2022-10-18 DIAGNOSIS — F41.1 GENERALIZED ANXIETY DISORDER: ICD-10-CM

## 2022-10-18 DIAGNOSIS — M15.9 PRIMARY OSTEOARTHRITIS INVOLVING MULTIPLE JOINTS: ICD-10-CM

## 2022-10-18 DIAGNOSIS — G89.4 CHRONIC PAIN SYNDROME: ICD-10-CM

## 2022-10-18 DIAGNOSIS — I25.10 CORONARY ARTERY DISEASE INVOLVING NATIVE CORONARY ARTERY OF NATIVE HEART WITHOUT ANGINA PECTORIS: Primary | ICD-10-CM

## 2022-10-18 DIAGNOSIS — E66.01 MORBIDLY OBESE (HCC): ICD-10-CM

## 2022-10-18 PROBLEM — F02.818 EARLY ONSET ALZHEIMER'S DISEASE WITH BEHAVIORAL DISTURBANCE (HCC): Status: RESOLVED | Noted: 2020-09-25 | Resolved: 2022-10-18

## 2022-10-18 PROBLEM — G30.0 EARLY ONSET ALZHEIMER'S DISEASE WITH BEHAVIORAL DISTURBANCE (HCC): Status: RESOLVED | Noted: 2020-09-25 | Resolved: 2022-10-18

## 2022-10-18 PROCEDURE — G8484 FLU IMMUNIZE NO ADMIN: HCPCS | Performed by: FAMILY MEDICINE

## 2022-10-18 PROCEDURE — G0008 ADMIN INFLUENZA VIRUS VAC: HCPCS | Performed by: FAMILY MEDICINE

## 2022-10-18 PROCEDURE — G8417 CALC BMI ABV UP PARAM F/U: HCPCS | Performed by: FAMILY MEDICINE

## 2022-10-18 PROCEDURE — 1123F ACP DISCUSS/DSCN MKR DOCD: CPT | Performed by: FAMILY MEDICINE

## 2022-10-18 PROCEDURE — 90694 VACC AIIV4 NO PRSRV 0.5ML IM: CPT | Performed by: FAMILY MEDICINE

## 2022-10-18 PROCEDURE — 1036F TOBACCO NON-USER: CPT | Performed by: FAMILY MEDICINE

## 2022-10-18 PROCEDURE — 99214 OFFICE O/P EST MOD 30 MIN: CPT | Performed by: FAMILY MEDICINE

## 2022-10-18 PROCEDURE — G8427 DOCREV CUR MEDS BY ELIG CLIN: HCPCS | Performed by: FAMILY MEDICINE

## 2022-10-18 PROCEDURE — 1090F PRES/ABSN URINE INCON ASSESS: CPT | Performed by: FAMILY MEDICINE

## 2022-10-18 RX ORDER — CLOPIDOGREL BISULFATE 75 MG/1
TABLET ORAL
COMMUNITY
Start: 2022-08-10 | End: 2022-10-18 | Stop reason: SDUPTHER

## 2022-10-18 RX ORDER — ROSUVASTATIN CALCIUM 20 MG/1
TABLET, COATED ORAL
Qty: 90 TABLET | Refills: 1 | Status: SHIPPED | OUTPATIENT
Start: 2022-10-18

## 2022-10-18 RX ORDER — HYDROCODONE BITARTRATE AND ACETAMINOPHEN 7.5; 325 MG/1; MG/1
1 TABLET ORAL EVERY 12 HOURS PRN
Qty: 60 TABLET | Refills: 0 | Status: SHIPPED | OUTPATIENT
Start: 2022-12-17 | End: 2023-01-16

## 2022-10-18 RX ORDER — CLOPIDOGREL BISULFATE 75 MG/1
TABLET ORAL
Qty: 90 TABLET | Refills: 1 | Status: SHIPPED | OUTPATIENT
Start: 2022-10-18

## 2022-10-18 RX ORDER — ROSUVASTATIN CALCIUM 20 MG/1
TABLET, COATED ORAL
COMMUNITY
Start: 2022-09-13 | End: 2022-10-18 | Stop reason: SDUPTHER

## 2022-10-18 RX ORDER — PANTOPRAZOLE SODIUM 40 MG/1
TABLET, DELAYED RELEASE ORAL
Qty: 90 TABLET | Refills: 1 | Status: SHIPPED | OUTPATIENT
Start: 2022-10-18

## 2022-10-18 RX ORDER — HYDROCODONE BITARTRATE AND ACETAMINOPHEN 7.5; 325 MG/1; MG/1
1 TABLET ORAL EVERY 12 HOURS PRN
Qty: 60 TABLET | Refills: 0 | Status: SHIPPED | OUTPATIENT
Start: 2022-11-17 | End: 2022-12-17

## 2022-10-18 RX ORDER — HYDROCODONE BITARTRATE AND ACETAMINOPHEN 7.5; 325 MG/1; MG/1
1 TABLET ORAL EVERY 12 HOURS PRN
Qty: 60 TABLET | Refills: 0 | Status: SHIPPED | OUTPATIENT
Start: 2022-10-18 | End: 2022-11-17

## 2022-10-18 ASSESSMENT — ENCOUNTER SYMPTOMS
BACK PAIN: 1
SHORTNESS OF BREATH: 0
WHEEZING: 0
COUGH: 0
VOMITING: 0
CONSTIPATION: 0
ABDOMINAL PAIN: 0
DIARRHEA: 0
NAUSEA: 0

## 2022-10-18 NOTE — PROGRESS NOTES
10/18/22  Lisa Davenport : 1929 Sex: female  Age: 80 y.o. Chief Complaint   Patient presents with    Hypertension     HPI:  80 y.o. female presents today for 3 month follow up of chronic medical conditions, medication refills and FBW results. Patient's chart, medical, surgical and medication history all reviewed. Chronic Pain  Patient has long standing history of chronic back pain with numbness and tingling in her feet. Has had long standing lumbar disc disease with past laminectomy. She has been on Norco for many years. Compliant with follow ups. Noting that she is having more pain in the lateral hips and into her legs. Does not walk much anymore- mostly sedentary. States that the neuropathy in her feet is the worst part. Now having \"twitching\" of her upper extremities. Coronary Artery Disease   Patient presents for routine coronary artery disease follow-up. Cardiac risk factors include advanced age (older than 54 for men, 72 for women), dyslipidemia, hypertension, obesity (BMI >= 30 kg/m2) and sedentary lifestyle. Patient has a history of stent placement years ago. She is  on ASA/Statin/BB/ACEi.     ROS:  Review of Systems   Constitutional:  Negative for chills, fatigue and fever. Respiratory:  Negative for cough, shortness of breath and wheezing. Cardiovascular:  Negative for chest pain and palpitations. Gastrointestinal:  Negative for abdominal pain, constipation, diarrhea, nausea and vomiting. Genitourinary:  Positive for frequency (incontinence). Musculoskeletal:  Positive for arthralgias, back pain and gait problem. Skin:  Negative for rash. Neurological:  Positive for weakness and numbness. Negative for dizziness and headaches. Psychiatric/Behavioral:  Negative for dysphoric mood. The patient is not nervous/anxious. All other systems reviewed and are negative.      Current Outpatient Medications on File Prior to Visit   Medication Sig Dispense Refill sertraline (ZOLOFT) 25 MG tablet Take 1 tablet by mouth daily 90 tablet 1    Cyanocobalamin 2500 MCG TABS Daily      donepezil (ARICEPT) 10 MG tablet Take 1 tablet by mouth nightly 90 tablet 1    albuterol sulfate HFA (VENTOLIN HFA) 108 (90 Base) MCG/ACT inhaler Inhale 2 puffs into the lungs 4 times daily as needed for Wheezing 54 g 1    aspirin 325 MG tablet Take 325 mg by mouth every morning (before breakfast)       No current facility-administered medications on file prior to visit. Allergies   Allergen Reactions    Bactrim [Sulfamethoxazole-Trimethoprim]     Lyrica [Pregabalin]     Prednisone     Sulfa Antibiotics Rash       Past Medical History:   Diagnosis Date    Anxiety with depression     Arrhythmia     PAF    CAD (coronary artery disease)     Chronic kidney disease     stage 3    Chronic pain syndrome     Colon cancer (HCC)     Early onset Alzheimer's disease with behavioral disturbance ([facility] [address] 9/25/2020    History of CVA (cerebrovascular accident) without residual deficits     Hyperlipidemia     Hypertension     Osteoarthritis     Pneumonia due to infectious organism 12/1/2021    Shingles     Sleep apnea     TIA (transient ischemic attack)      Past Surgical History:   Procedure Laterality Date    BARTHOLIN GLAND CYST EXCISION      CARPAL TUNNEL RELEASE      CATARACT REMOVAL      COLECTOMY      with colostomy    DIAGNOSTIC CARDIAC CATH LAB PROCEDURE      drug eluting stent proximal RCA and diagonal branch    HYSTERECTOMY (CERVIX STATUS UNKNOWN)      LUMBAR LAMINECTOMY       Family History   Problem Relation Age of Onset    Heart Disease Mother         age 80    Colon Cancer Father         age 80    No Known Problems Brother     Cancer Brother         liver     Social History     Socioeconomic History    Marital status:       Spouse name: Not on file    Number of children: Not on file    Years of education: Not on file    Highest education level: Not on file   Occupational History    Occupation: retired various jobs/sales   Tobacco Use    Smoking status: Never    Smokeless tobacco: Never   Vaping Use    Vaping Use: Never used   Substance and Sexual Activity    Alcohol use: Never    Drug use: Never    Sexual activity: Not Currently     Partners: Male     Birth control/protection: Post-menopausal   Other Topics Concern    Not on file   Social History Narrative    Not on file     Social Determinants of Health     Financial Resource Strain: Low Risk     Difficulty of Paying Living Expenses: Not hard at all   Food Insecurity: No Food Insecurity    Worried About Running Out of Food in the Last Year: Never true    Ran Out of Food in the Last Year: Never true   Transportation Needs: Not on file   Physical Activity: Inactive    Days of Exercise per Week: 0 days    Minutes of Exercise per Session: 0 min   Stress: Not on file   Social Connections: Not on file   Intimate Partner Violence: Not on file   Housing Stability: Not on file       Vitals:    10/18/22 1417   BP: 110/70   Pulse: 67   Temp: 97.6 °F (36.4 °C)   SpO2: 95%       Physical Exam:  Physical Exam  Vitals and nursing note reviewed. Constitutional:       General: She is not in acute distress. Appearance: Normal appearance. She is well-developed. She is obese. She is not ill-appearing. HENT:      Head: Normocephalic and atraumatic. Right Ear: Hearing and external ear normal.      Left Ear: Hearing and external ear normal.      Nose:      Comments: Wearing mask  Eyes:      General: Lids are normal. No scleral icterus. Extraocular Movements: Extraocular movements intact. Conjunctiva/sclera: Conjunctivae normal.   Neck:      Thyroid: No thyromegaly. Cardiovascular:      Rate and Rhythm: Normal rate and regular rhythm. Heart sounds: Normal heart sounds. No murmur heard. Pulmonary:      Effort: Pulmonary effort is normal. No respiratory distress. Breath sounds: Normal breath sounds. No wheezing.    Musculoskeletal: General: No tenderness or deformity. Cervical back: Normal range of motion and neck supple. Right lower leg: No edema. Left lower leg: No edema. Lymphadenopathy:      Cervical: No cervical adenopathy. Skin:     General: Skin is warm and dry. Findings: No rash. Neurological:      General: No focal deficit present. Mental Status: She is alert and oriented to person, place, and time. Gait: Gait abnormal (patient seated in wheelchair). Psychiatric:         Mood and Affect: Mood and affect normal.         Speech: Speech normal.         Behavior: Behavior normal.         Thought Content:  Thought content normal.       Labs:  CBC with Differential:    Lab Results   Component Value Date/Time    WBC 7.2 10/14/2022 02:58 PM    RBC 4.10 10/14/2022 02:58 PM    HGB 12.6 10/14/2022 02:58 PM    HCT 39.4 10/14/2022 02:58 PM    PLT 64 10/14/2022 02:58 PM    MCV 96.1 10/14/2022 02:58 PM    MCH 30.7 10/14/2022 02:58 PM    MCHC 32.0 10/14/2022 02:58 PM    RDW 13.8 10/14/2022 02:58 PM    SEGSPCT 84.6 11/30/2021 04:16 AM    BANDSPCT 4.0 01/26/2022 04:45 PM    LYMPHOPCT 11.9 10/14/2022 02:58 PM    MONOPCT 7.3 10/14/2022 02:58 PM    BASOPCT 0.3 10/14/2022 02:58 PM    MONOSABS 0.52 10/14/2022 02:58 PM    LYMPHSABS 0.85 10/14/2022 02:58 PM    EOSABS 0.06 10/14/2022 02:58 PM    BASOSABS 0.02 10/14/2022 02:58 PM     CMP:    Lab Results   Component Value Date/Time     10/14/2022 02:58 PM    K 4.4 10/14/2022 02:58 PM     10/14/2022 02:58 PM    CO2 24 10/14/2022 02:58 PM    BUN 18 10/14/2022 02:58 PM    CREATININE 1.2 10/14/2022 02:58 PM    CREATININE 1.3 12/14/2018 12:00 AM    GFRAA 51 10/14/2022 02:58 PM    AGRATIO 1.2 01/26/2022 04:45 PM    LABGLOM 42 10/14/2022 02:58 PM    GLUCOSE 111 10/14/2022 02:58 PM    PROT 6.6 10/14/2022 02:58 PM    LABALBU 3.8 10/14/2022 02:58 PM    CALCIUM 9.2 10/14/2022 02:58 PM    BILITOT 0.4 10/14/2022 02:58 PM    ALKPHOS 60 10/14/2022 02:58 PM    AST 17 10/14/2022 02:58 PM    ALT 8 10/14/2022 02:58 PM     Uric Acid:    Lab Results   Component Value Date/Time    LABURIC 4.7 10/14/2022 02:58 PM     Microalbumen/Creatinine ratio:  No components found for: RUCREAT  FLP:    Lab Results   Component Value Date/Time    TRIG 145 10/14/2022 02:58 PM    HDL 44 10/14/2022 02:58 PM    LDLCALC 69 10/14/2022 02:58 PM    LABVLDL 29 10/14/2022 02:58 PM     TSH:    Lab Results   Component Value Date/Time    TSH 2.360 10/14/2022 02:58 PM          Assessment and Plan:  Arely Castañeda was seen today for hypertension. Diagnoses and all orders for this visit:    Coronary artery disease involving native coronary artery of native heart without angina pectoris  -     clopidogrel (PLAVIX) 75 MG tablet; TAKE ONE TABLET BY MOUTH DAILY  -     rosuvastatin (CRESTOR) 20 MG tablet; TAKE ONE TABLET BY MOUTH DAILY  -     External Referral To Home Health  Patient restarted Crestor despite our previous conversation due to fear of something happening. Given joint pains and weakness, had previously explained that her risk with or without statin is likely the same given age. She prefers to take it    Chronic renal impairment, stage 3b ([facility] [address]  -     External Referral To Home Health  Improved    Thrombocytopenia, unspecified  -     dexamethasone (DECADRON) 0.75 MG tablet; Take 1 tablet by mouth 3 times daily for 5 days  -     CBC with Auto Differential; Future  Stable. Patient allergic to Prednisone, but previously on Decadron during [place]. Will try steroid to improve counts. Gastroesophageal reflux disease without esophagitis  -     pantoprazole (PROTONIX) 40 MG tablet; TAKE ONE TABLET BY MOUTH DAILY    Primary osteoarthritis involving multiple joints  -     HYDROcodone-acetaminophen (NORCO) 7.5-325 MG per tablet; Take 1 tablet by mouth every 12 hours as needed for Pain for up to 30 days. Intended supply: 30 days  -     HYDROcodone-acetaminophen (NORCO) 7.5-325 MG per tablet;  Take 1 tablet by mouth every 12 hours as needed for Pain for up to 30 days. Intended supply: 30 days  -     HYDROcodone-acetaminophen (NORCO) 7.5-325 MG per tablet; Take 1 tablet by mouth every 12 hours as needed for Pain for up to 30 days. Intended supply: 30 days  -     External Referral To Home Health  -     dexamethasone (DECADRON) 0.75 MG tablet; Take 1 tablet by mouth 3 times daily for 5 days  OARRS reviewed and is appropriate    Generalized anxiety disorder  -     External Referral To Home Health    History of stroke without residual deficits  -     clopidogrel (PLAVIX) 75 MG tablet; TAKE ONE TABLET BY MOUTH DAILY  -     External Referral To Home Health    Chronic pain syndrome  -     External Referral To Home Health    Need for influenza vaccination  -     Influenza, FLUAD, (age 72 y+), IM, Preservative Free, 0.5 mL    Morbidly obese (HCC)          Return in about 3 months (around 1/18/2023), or if symptoms worsen or fail to improve, for Chronic medical conditions.       Seen By:  Merline Broody, DO

## 2023-01-09 ENCOUNTER — PROCEDURE VISIT (OUTPATIENT)
Dept: PODIATRY | Age: 88
End: 2023-01-09
Payer: MEDICARE

## 2023-01-09 VITALS
BODY MASS INDEX: 34.34 KG/M2 | WEIGHT: 170 LBS | SYSTOLIC BLOOD PRESSURE: 124 MMHG | DIASTOLIC BLOOD PRESSURE: 68 MMHG | TEMPERATURE: 97.8 F

## 2023-01-09 DIAGNOSIS — R26.2 DIFFICULTY WALKING: ICD-10-CM

## 2023-01-09 DIAGNOSIS — M79.674 PAIN IN TOE OF RIGHT FOOT: ICD-10-CM

## 2023-01-09 DIAGNOSIS — B35.1 TINEA UNGUIUM: Primary | ICD-10-CM

## 2023-01-09 DIAGNOSIS — I73.9 PERIPHERAL VASCULAR DISEASE, UNSPECIFIED (HCC): ICD-10-CM

## 2023-01-09 DIAGNOSIS — G62.9 NEUROPATHY: ICD-10-CM

## 2023-01-09 DIAGNOSIS — M79.675 PAIN IN LEFT TOE(S): ICD-10-CM

## 2023-01-09 PROCEDURE — G8427 DOCREV CUR MEDS BY ELIG CLIN: HCPCS | Performed by: PODIATRIST

## 2023-01-09 PROCEDURE — 99213 OFFICE O/P EST LOW 20 MIN: CPT | Performed by: PODIATRIST

## 2023-01-09 PROCEDURE — G8484 FLU IMMUNIZE NO ADMIN: HCPCS | Performed by: PODIATRIST

## 2023-01-09 PROCEDURE — G8417 CALC BMI ABV UP PARAM F/U: HCPCS | Performed by: PODIATRIST

## 2023-01-09 PROCEDURE — 11721 DEBRIDE NAIL 6 OR MORE: CPT | Performed by: PODIATRIST

## 2023-01-09 PROCEDURE — 1123F ACP DISCUSS/DSCN MKR DOCD: CPT | Performed by: PODIATRIST

## 2023-01-09 PROCEDURE — 1036F TOBACCO NON-USER: CPT | Performed by: PODIATRIST

## 2023-01-09 PROCEDURE — 1090F PRES/ABSN URINE INCON ASSESS: CPT | Performed by: PODIATRIST

## 2023-01-09 RX ORDER — GABAPENTIN 100 MG/1
100 CAPSULE ORAL 2 TIMES DAILY
Qty: 60 CAPSULE | Refills: 5 | Status: SHIPPED | OUTPATIENT
Start: 2023-01-09 | End: 2023-07-08

## 2023-01-09 ASSESSMENT — ENCOUNTER SYMPTOMS
EYES NEGATIVE: 1
BACK PAIN: 1

## 2023-01-09 NOTE — PROGRESS NOTES
[facility]  Return Patient    Chief Complaint   Patient presents with    Toe Pain     Saw pcp Dr. Kaylan Montalvo 7/14/2022       Subjective: This Estrada Addison comes to clinic for foot and nail care. Pt currently has complaint of thickened, painful, elongated nails that he/she cannot manage by themselves. Pt. Relates pain to nails with shoe gear. Pt's primary care physician is Kaylan Montalvo DO. Pt having  Pain numbness  Burning  both feet   No trauma  On gabapentin 100 tid   Past Medical History:   Diagnosis Date    Anxiety with depression     Arrhythmia     PAF    CAD (coronary artery disease)     Chronic kidney disease     stage 3    Chronic pain syndrome     Colon cancer (HCC)     Early onset Alzheimer's disease with behavioral disturbance ([facility] [address] 9/25/2020    History of CVA (cerebrovascular accident) without residual deficits     Hyperlipidemia     Hypertension     Osteoarthritis     Pneumonia due to infectious organism 12/1/2021    Shingles     Sleep apnea     TIA (transient ischemic attack)        Allergies   Allergen Reactions    Bactrim [Sulfamethoxazole-Trimethoprim]     Lyrica [Pregabalin]     Prednisone     Sulfa Antibiotics Rash     Current Outpatient Medications on File Prior to Visit   Medication Sig Dispense Refill    pantoprazole (PROTONIX) 40 MG tablet TAKE ONE TABLET BY MOUTH DAILY 90 tablet 1    clopidogrel (PLAVIX) 75 MG tablet TAKE ONE TABLET BY MOUTH DAILY 90 tablet 1    rosuvastatin (CRESTOR) 20 MG tablet TAKE ONE TABLET BY MOUTH DAILY 90 tablet 1    HYDROcodone-acetaminophen (NORCO) 7.5-325 MG per tablet Take 1 tablet by mouth every 12 hours as needed for Pain for up to 30 days.  Intended supply: 30 days 60 tablet 0    sertraline (ZOLOFT) 25 MG tablet Take 1 tablet by mouth daily 90 tablet 1    Cyanocobalamin 2500 MCG TABS Daily      donepezil (ARICEPT) 10 MG tablet Take 1 tablet by mouth nightly 90 tablet 1    albuterol sulfate HFA (VENTOLIN HFA) 108 (90 Base) MCG/ACT inhaler Inhale 2 puffs into the lungs 4 times daily as needed for Wheezing 54 g 1    aspirin 325 MG tablet Take 325 mg by mouth every morning (before breakfast)      HYDROcodone-acetaminophen (NORCO) 7.5-325 MG per tablet Take 1 tablet by mouth every 12 hours as needed for Pain for up to 30 days. Intended supply: 30 days 60 tablet 0    HYDROcodone-acetaminophen (NORCO) 7.5-325 MG per tablet Take 1 tablet by mouth every 12 hours as needed for Pain for up to 30 days. Intended supply: 30 days 60 tablet 0     No current facility-administered medications on file prior to visit. Review of Systems   Constitutional: Negative. HENT: Negative. Eyes: Negative. Endocrine: Negative. Genitourinary: Negative. Musculoskeletal:  Positive for arthralgias, back pain, gait problem and joint swelling. Objective:  General: AAO x 3 in NAD.     Derm  Toenail Description  Sites of Onychomycosis Involvement (Check affected area)  [x] [x] [x] [x] [x] [x] [x] [x] [x] [x]  5 4 3 2 1 1 2 3 4 5                          Right                                        Left    Thickness  [x] [x] [x] [x] [x] [x] [x] [x] [x] [x]  5 4 3 2 1 1 2 3 4 5                         Right                                        Left    Dystrophic Changes   [x] [x] [x] [x] [x] [x] [x] [x] [x] [x]  5 4 3 2 1 1 2 3 4 5                         Right                                        Left    Color  [x] [x] [x] [x] [x] [x] [x] [x] [x] [x]  5 4 3 2 1 1 2 3 4 5                          Right                                        Left    Incurvation/Ingrowin   [x] [x] [x] [x] [x] [x] [x] [x] [x] [x]  5 4 3 2 1 1 2 3 4 5                         Right                                        Left    Inflammation/Pain   [x] [x] [x] [x] [x] [x] [x] [x] [x] [x]  5 4 3  2 1 1 2 3 4 5                         Right                                        Left      Nails that are described above are all elongated thickened pitting mycotic yellowish incurvated causing pain with both shoe gear. Palpation1-5  Bilateral     Dermatologic Exam:hair loss noted  lower extremity    Skin lesion/ulceration   Skin   Callus neg  Musculoskeletal:     1st MPJ ROM normal, Bilateral.  Muscle strength 5/5, Bilateral.  Pain present upon palpation of toenails 1-5  Bilateral., Bilateral.  Ankle ROM normal,Bilateral.    Dorsally contracted digits, Bilateral.     Vascular:  Pulses   bilateral DP absent    PT absent    Neurological:  Sensation present to light touch to level of digits, Bilateral.    Foot Exam    General  General Appearance: appears stated age and healthy   Orientation: alert and oriented to person, place, and time   Assistance: walker use and wheelchair use       Right Foot/Ankle     Inspection and Palpation  Tenderness: great toe metatarsophalangeal joint and metatarsals   Hallux valgus: yes  Hallux limitus: yes  Skin Exam: abnormal color; Neurovascular  Dorsalis pedis: absent  Posterior tibial: absent  Saphenous nerve sensation: diminished  Tibial nerve sensation: diminished  Superficial peroneal nerve sensation: diminished  Deep peroneal nerve sensation: diminished  Sural nerve sensation: diminished      Left Foot/Ankle      Inspection and Palpation  Tenderness: metatarsals   Hallux valgus: yes  Skin Exam: abnormal color; Neurovascular  Dorsalis pedis: absent  Posterior tibial: absent  Saphenous nerve sensation: diminished  Tibial nerve sensation: diminished  Superficial peroneal nerve sensation: diminished  Deep peroneal nerve sensation: diminished  Sural nerve sensation: diminished         Ortho Exam  Q7   []Yes    []No                Q8   [x]Yes    []No                     Q9   []Yes    []No  Assessment:  80 y.o. female with:   1. Tinea unguium    2. Peripheral vascular disease, unspecified ([address]    3. Pain in toe of right foot    4. Pain in left toe(s)    5. Difficulty walking    6. Neuropathy     No orders of the defined types were placed in this encounter.     Bonnie Kim was seen today for toe pain. Diagnoses and all orders for this visit:    Tinea unguium    Peripheral vascular disease, unspecified ([address]    Pain in toe of right foot    Pain in left toe(s)    Difficulty walking    Neuropathy    Other orders  -     gabapentin (NEURONTIN) 100 MG capsule; Take 1 capsule by mouth 2 times daily for 180 days. Intended supply: 30 days         Plan:  Pt seen for  chronic pain   oarrs report reviewed I have reviewed current medication  and prior noted I believe the need for refill is warranted  I have reviewed oarrs report and found no drug seeking behavior   I have discussed all side effects and narcotic policy   with pt  pt understands and agrees with treatment  a follow up  appointment is made    Pt was evaluated and examined. Patient was given personalized discharge instructions. Nails 1-10 were debrided in length and thickness sharply with a nail nipper and  without incident. Pt will follow up in 9 weeks or sooner if any problems arise. Diagnosis was discussed with the pt and all of their questions were answered in detail. Proper foot hygiene and care was discussed with the pt. Patient to check feet daily and contact the office with any questions/problems/concerns. Other comorbidity noted and will be managed by PCP. Pain waiver discussed with patient and confirmed.  Refilled gabapentin   1/9/2023      Electronically signed by Iris Landry DPM on 1/9/2023 at 3:17 PM  1/9/2023

## 2023-01-16 DIAGNOSIS — F02.818 EARLY ONSET ALZHEIMER'S DISEASE WITH BEHAVIORAL DISTURBANCE (HCC): ICD-10-CM

## 2023-01-16 DIAGNOSIS — G30.0 EARLY ONSET ALZHEIMER'S DISEASE WITH BEHAVIORAL DISTURBANCE (HCC): ICD-10-CM

## 2023-01-16 RX ORDER — DONEPEZIL HYDROCHLORIDE 10 MG/1
10 TABLET, FILM COATED ORAL NIGHTLY
Qty: 90 TABLET | Refills: 1 | Status: SHIPPED | OUTPATIENT
Start: 2023-01-16

## 2023-01-19 ENCOUNTER — OFFICE VISIT (OUTPATIENT)
Dept: PRIMARY CARE CLINIC | Age: 88
End: 2023-01-19

## 2023-01-19 VITALS
WEIGHT: 164 LBS | HEIGHT: 59 IN | OXYGEN SATURATION: 95 % | BODY MASS INDEX: 33.06 KG/M2 | HEART RATE: 70 BPM | SYSTOLIC BLOOD PRESSURE: 98 MMHG | TEMPERATURE: 97.3 F | DIASTOLIC BLOOD PRESSURE: 56 MMHG

## 2023-01-19 DIAGNOSIS — L21.9 SEBORRHEIC DERMATITIS: ICD-10-CM

## 2023-01-19 DIAGNOSIS — L30.4 INTERTRIGO: ICD-10-CM

## 2023-01-19 DIAGNOSIS — E78.2 MIXED HYPERLIPIDEMIA: ICD-10-CM

## 2023-01-19 DIAGNOSIS — M15.9 PRIMARY OSTEOARTHRITIS INVOLVING MULTIPLE JOINTS: ICD-10-CM

## 2023-01-19 DIAGNOSIS — D69.6 THROMBOCYTOPENIA, UNSPECIFIED (HCC): ICD-10-CM

## 2023-01-19 DIAGNOSIS — I25.10 CORONARY ARTERY DISEASE INVOLVING NATIVE CORONARY ARTERY OF NATIVE HEART WITHOUT ANGINA PECTORIS: Primary | ICD-10-CM

## 2023-01-19 DIAGNOSIS — E66.01 MORBIDLY OBESE (HCC): ICD-10-CM

## 2023-01-19 DIAGNOSIS — G89.4 CHRONIC PAIN SYNDROME: ICD-10-CM

## 2023-01-19 DIAGNOSIS — I95.9 HYPOTENSION, UNSPECIFIED HYPOTENSION TYPE: ICD-10-CM

## 2023-01-19 DIAGNOSIS — F01.A0 MILD VASCULAR DEMENTIA WITHOUT BEHAVIORAL DISTURBANCE, PSYCHOTIC DISTURBANCE, MOOD DISTURBANCE, OR ANXIETY: ICD-10-CM

## 2023-01-19 DIAGNOSIS — N18.32 CHRONIC RENAL IMPAIRMENT, STAGE 3B (HCC): ICD-10-CM

## 2023-01-19 RX ORDER — KETOCONAZOLE 20 MG/ML
SHAMPOO TOPICAL DAILY PRN
COMMUNITY
End: 2023-01-19 | Stop reason: SDUPTHER

## 2023-01-19 RX ORDER — HYDROCODONE BITARTRATE AND ACETAMINOPHEN 7.5; 325 MG/1; MG/1
1 TABLET ORAL EVERY 12 HOURS PRN
Qty: 60 TABLET | Refills: 0 | Status: SHIPPED | OUTPATIENT
Start: 2023-03-16 | End: 2023-04-15

## 2023-01-19 RX ORDER — HYDROCODONE BITARTRATE AND ACETAMINOPHEN 7.5; 325 MG/1; MG/1
1 TABLET ORAL EVERY 12 HOURS PRN
Qty: 60 TABLET | Refills: 0 | Status: SHIPPED | OUTPATIENT
Start: 2023-01-19 | End: 2023-02-18

## 2023-01-19 RX ORDER — KETOCONAZOLE 20 MG/G
CREAM TOPICAL DAILY
COMMUNITY
End: 2023-01-19 | Stop reason: SDUPTHER

## 2023-01-19 RX ORDER — KETOCONAZOLE 20 MG/ML
SHAMPOO TOPICAL DAILY PRN
Qty: 120 ML | Refills: 5 | Status: SHIPPED | OUTPATIENT
Start: 2023-01-19

## 2023-01-19 RX ORDER — KETOCONAZOLE 20 MG/G
CREAM TOPICAL DAILY
Qty: 30 G | Refills: 5 | Status: SHIPPED | OUTPATIENT
Start: 2023-01-19

## 2023-01-19 RX ORDER — HYDROCODONE BITARTRATE AND ACETAMINOPHEN 7.5; 325 MG/1; MG/1
1 TABLET ORAL EVERY 12 HOURS PRN
Qty: 60 TABLET | Refills: 0 | Status: SHIPPED | OUTPATIENT
Start: 2023-02-16 | End: 2023-03-18

## 2023-01-19 ASSESSMENT — ENCOUNTER SYMPTOMS
NAUSEA: 0
DIARRHEA: 0
ABDOMINAL PAIN: 0
COUGH: 0
WHEEZING: 0
VOMITING: 0
BACK PAIN: 1
CONSTIPATION: 0
SHORTNESS OF BREATH: 0

## 2023-01-19 ASSESSMENT — PATIENT HEALTH QUESTIONNAIRE - PHQ9
SUM OF ALL RESPONSES TO PHQ9 QUESTIONS 1 & 2: 0
1. LITTLE INTEREST OR PLEASURE IN DOING THINGS: 0
2. FEELING DOWN, DEPRESSED OR HOPELESS: 0
SUM OF ALL RESPONSES TO PHQ QUESTIONS 1-9: 0

## 2023-01-19 NOTE — PROGRESS NOTES
23  Bianca Bhatia : 1929 Sex: female  Age: 80 y.o. Chief Complaint   Patient presents with    Chronic Pain     HPI:  80 y.o. female presents today for 3 month follow up of chronic medical conditions, medication refills and FBW. Patient's chart, medical, surgical and medication history all reviewed. Chronic Pain  Patient has long standing history of chronic back pain with numbness and tingling in her feet. Has had long standing lumbar disc disease with past laminectomy. She has been on Norco for many years. Compliant with follow ups. Noting that she is having more pain in the lateral hips and into her legs. Does not walk much anymore- mostly sedentary. States that the neuropathy in her feet is the worst part. Now having \"twitching\" of her upper extremities. Coronary Artery Disease   Patient presents for routine coronary artery disease follow-up. Cardiac risk factors include advanced age (older than 54 for men, 72 for women), dyslipidemia, hypertension, obesity (BMI >= 30 kg/m2) and sedentary lifestyle. Patient has a history of stent placement years ago. She is  on ASA/Statin/BB/ACEi.     ROS:  Review of Systems   Constitutional:  Negative for chills, fatigue and fever. Respiratory:  Negative for cough, shortness of breath and wheezing. Cardiovascular:  Negative for chest pain and palpitations. Gastrointestinal:  Negative for abdominal pain, constipation, diarrhea, nausea and vomiting. Genitourinary:  Positive for frequency (incontinence). Musculoskeletal:  Positive for arthralgias, back pain and gait problem. Skin:  Negative for rash. Neurological:  Positive for weakness and numbness. Negative for dizziness and headaches. Psychiatric/Behavioral:  Negative for dysphoric mood. The patient is not nervous/anxious. All other systems reviewed and are negative.      Current Outpatient Medications on File Prior to Visit   Medication Sig Dispense Refill ketoconazole (NIZORAL) 2 % cream Apply topically daily Apply topically daily. ketoconazole (NIZORAL) 2 % shampoo Apply topically daily as needed for Itching Apply topically daily as needed. donepezil (ARICEPT) 10 MG tablet Take 1 tablet by mouth nightly 90 tablet 1    pantoprazole (PROTONIX) 40 MG tablet TAKE ONE TABLET BY MOUTH DAILY 90 tablet 1    clopidogrel (PLAVIX) 75 MG tablet TAKE ONE TABLET BY MOUTH DAILY 90 tablet 1    rosuvastatin (CRESTOR) 20 MG tablet TAKE ONE TABLET BY MOUTH DAILY 90 tablet 1    HYDROcodone-acetaminophen (NORCO) 7.5-325 MG per tablet Take 1 tablet by mouth every 12 hours as needed for Pain for up to 30 days. Intended supply: 30 days 60 tablet 0    sertraline (ZOLOFT) 25 MG tablet Take 1 tablet by mouth daily 90 tablet 1    albuterol sulfate HFA (VENTOLIN HFA) 108 (90 Base) MCG/ACT inhaler Inhale 2 puffs into the lungs 4 times daily as needed for Wheezing 54 g 1    aspirin 325 MG tablet Take 325 mg by mouth every morning (before breakfast)       No current facility-administered medications on file prior to visit.        Allergies   Allergen Reactions    Bactrim [Sulfamethoxazole-Trimethoprim]     Lyrica [Pregabalin]     Prednisone     Sulfa Antibiotics Rash       Past Medical History:   Diagnosis Date    Anxiety with depression     Arrhythmia     PAF    CAD (coronary artery disease)     Chronic kidney disease     stage 3    Chronic pain syndrome     Colon cancer ([facility] [address]     Early onset Alzheimer's disease with behavioral disturbance ([facility] [address] 9/25/2020    History of CVA (cerebrovascular accident) without residual deficits     Hyperlipidemia     Hypertension     Osteoarthritis     Pneumonia due to infectious organism 12/1/2021    Shingles     Sleep apnea     TIA (transient ischemic attack)      Past Surgical History:   Procedure Laterality Date    BARTHOLIN GLAND CYST EXCISION      CARPAL TUNNEL RELEASE      CATARACT REMOVAL      COLECTOMY      with colostomy    DIAGNOSTIC CARDIAC CATH LAB PROCEDURE      drug eluting stent proximal RCA and diagonal branch    HYSTERECTOMY (CERVIX STATUS UNKNOWN)      LUMBAR LAMINECTOMY       Family History   Problem Relation Age of Onset    Heart Disease Mother         age 80    Colon Cancer Father         age 80    No Known Problems Brother     Cancer Brother         liver     Social History     Socioeconomic History    Marital status:      Spouse name: Not on file    Number of children: Not on file    Years of education: Not on file    Highest education level: Not on file   Occupational History    Occupation: retired various jobs/sales   Tobacco Use    Smoking status: Never    Smokeless tobacco: Never   Vaping Use    Vaping Use: Never used   Substance and Sexual Activity    Alcohol use: Never    Drug use: Never    Sexual activity: Not Currently     Partners: Male     Birth control/protection: Post-menopausal   Other Topics Concern    Not on file   Social History Narrative    Not on file     Social Determinants of Health     Financial Resource Strain: Not on file   Food Insecurity: Not on file   Transportation Needs: Not on file   Physical Activity: Inactive    Days of Exercise per Week: 0 days    Minutes of Exercise per Session: 0 min   Stress: Not on file   Social Connections: Not on file   Intimate Partner Violence: Not on file   Housing Stability: Not on file       Vitals:    01/19/23 1416   BP: (!) 98/56   Pulse: 70   Temp: 97.3 °F (36.3 °C)   SpO2: 95%   Weight: 164 lb (74.4 kg)   Height: 4' 11\" (1.499 m)       Physical Exam:  Physical Exam  Vitals and nursing note reviewed. Constitutional:       General: She is not in acute distress. Appearance: Normal appearance. She is well-developed. She is obese. She is not ill-appearing. HENT:      Head: Normocephalic and atraumatic.       Right Ear: Hearing and external ear normal.      Left Ear: Hearing and external ear normal.      Nose: Nose normal.      Mouth/Throat:      Mouth: Mucous membranes are moist.   Eyes:      General: Lids are normal. No scleral icterus. Extraocular Movements: Extraocular movements intact. Conjunctiva/sclera: Conjunctivae normal.   Neck:      Thyroid: No thyromegaly. Cardiovascular:      Rate and Rhythm: Normal rate and regular rhythm. Heart sounds: Normal heart sounds. No murmur heard. Pulmonary:      Effort: Pulmonary effort is normal. No respiratory distress. Breath sounds: Normal breath sounds. No wheezing. Musculoskeletal:         General: No tenderness or deformity. Cervical back: Normal range of motion and neck supple. Right lower leg: No edema. Left lower leg: No edema. Lymphadenopathy:      Cervical: No cervical adenopathy. Skin:     General: Skin is warm and dry. Findings: No rash. Neurological:      General: No focal deficit present. Mental Status: She is alert and oriented to person, place, and time. Gait: Gait abnormal (patient seated in wheelchair). Psychiatric:         Mood and Affect: Mood and affect normal.         Speech: Speech normal.         Behavior: Behavior normal.         Thought Content:  Thought content normal.       Labs:  CBC with Differential:    Lab Results   Component Value Date/Time    WBC 7.2 10/14/2022 02:58 PM    RBC 4.10 10/14/2022 02:58 PM    HGB 12.6 10/14/2022 02:58 PM    HCT 39.4 10/14/2022 02:58 PM    PLT 64 10/14/2022 02:58 PM    MCV 96.1 10/14/2022 02:58 PM    MCH 30.7 10/14/2022 02:58 PM    MCHC 32.0 10/14/2022 02:58 PM    RDW 13.8 10/14/2022 02:58 PM    SEGSPCT 84.6 11/30/2021 04:16 AM    BANDSPCT 4.0 01/26/2022 04:45 PM    LYMPHOPCT 11.9 10/14/2022 02:58 PM    MONOPCT 7.3 10/14/2022 02:58 PM    BASOPCT 0.3 10/14/2022 02:58 PM    MONOSABS 0.52 10/14/2022 02:58 PM    LYMPHSABS 0.85 10/14/2022 02:58 PM    EOSABS 0.06 10/14/2022 02:58 PM    BASOSABS 0.02 10/14/2022 02:58 PM     CMP:    Lab Results   Component Value Date/Time     10/14/2022 02:58 PM    K 4.4 10/14/2022 02:58 PM  10/14/2022 02:58 PM    CO2 24 10/14/2022 02:58 PM    BUN 18 10/14/2022 02:58 PM    CREATININE 1.2 10/14/2022 02:58 PM    CREATININE 1.3 12/14/2018 12:00 AM    GFRAA 51 10/14/2022 02:58 PM    AGRATIO 1.2 01/26/2022 04:45 PM    LABGLOM 42 10/14/2022 02:58 PM    GLUCOSE 111 10/14/2022 02:58 PM    PROT 6.6 10/14/2022 02:58 PM    LABALBU 3.8 10/14/2022 02:58 PM    CALCIUM 9.2 10/14/2022 02:58 PM    BILITOT 0.4 10/14/2022 02:58 PM    ALKPHOS 60 10/14/2022 02:58 PM    AST 17 10/14/2022 02:58 PM    ALT 8 10/14/2022 02:58 PM     Uric Acid:    Lab Results   Component Value Date/Time    LABURIC 4.7 10/14/2022 02:58 PM     Microalbumen/Creatinine ratio:  No components found for: RUCREAT  FLP:    Lab Results   Component Value Date/Time    TRIG 145 10/14/2022 02:58 PM    HDL 44 10/14/2022 02:58 PM    LDLCALC 69 10/14/2022 02:58 PM    LABVLDL 29 10/14/2022 02:58 PM     TSH:    Lab Results   Component Value Date/Time    TSH 2.360 10/14/2022 02:58 PM          Assessment and Plan:  Marlo Chu was seen today for chronic pain. Diagnoses and all orders for this visit:    Coronary artery disease involving native coronary artery of native heart without angina pectoris  -     CBC with Auto Differential; Future  -     Comprehensive Metabolic Panel; Future  -     Lipid Panel; Future  -     TSH; Future  -     Urinalysis; Future  Patient due for labs prior to next OV. Mixed hyperlipidemia  -     Lipid Panel; Future  -     TSH; Future    Chronic renal impairment, stage 3b (HCC)  -     CBC with Auto Differential; Future  -     Comprehensive Metabolic Panel; Future  -     TSH; Future  -     Vitamin D 25 Hydroxy; Future  -     Urinalysis; Future    Primary osteoarthritis involving multiple joints  -     HYDROcodone-acetaminophen (NORCO) 7.5-325 MG per tablet; Take 1 tablet by mouth every 12 hours as needed for Pain for up to 30 days. Intended supply: 30 days  -     HYDROcodone-acetaminophen (NORCO) 7.5-325 MG per tablet;  Take 1 tablet by mouth every 12 hours as needed for Pain for up to 30 days. Intended supply: 30 days  -     HYDROcodone-acetaminophen (NORCO) 7.5-325 MG per tablet; Take 1 tablet by mouth every 12 hours as needed for Pain for up to 30 days. Intended supply: 30 days  OARRS reviewed and is appropriate    Chronic pain syndrome    Mild vascular dementia without behavioral disturbance, psychotic disturbance, mood disturbance, or anxiety  -     TSH; Future    Thrombocytopenia, unspecified (HCC)  -     CBC with Auto Differential; Future    Intertrigo  -     ketoconazole (NIZORAL) 2 % cream; Apply topically daily Apply topically daily. Seborrheic dermatitis  -     ketoconazole (NIZORAL) 2 % shampoo; Apply topically daily as needed for Itching Apply topically daily as needed. Hypotension, unspecified hypotension type  Patient off all BP medications at this time. Discussed increased fluid and salt intake    Morbidly obese (HCC)        Return in about 3 months (around 4/19/2023), or if symptoms worsen or fail to improve, for AWV.       Seen By:  Simeon Becerra, DO

## 2023-04-10 DIAGNOSIS — F01.A0 MILD VASCULAR DEMENTIA WITHOUT BEHAVIORAL DISTURBANCE, PSYCHOTIC DISTURBANCE, MOOD DISTURBANCE, OR ANXIETY (HCC): ICD-10-CM

## 2023-04-10 DIAGNOSIS — D69.6 THROMBOCYTOPENIA, UNSPECIFIED (HCC): ICD-10-CM

## 2023-04-10 DIAGNOSIS — I25.10 CORONARY ARTERY DISEASE INVOLVING NATIVE CORONARY ARTERY OF NATIVE HEART WITHOUT ANGINA PECTORIS: ICD-10-CM

## 2023-04-10 DIAGNOSIS — N18.32 CHRONIC RENAL IMPAIRMENT, STAGE 3B (HCC): ICD-10-CM

## 2023-04-10 DIAGNOSIS — E78.2 MIXED HYPERLIPIDEMIA: ICD-10-CM

## 2023-04-10 LAB
ALBUMIN SERPL-MCNC: 3.6 G/DL (ref 3.5–5.2)
ALP SERPL-CCNC: 54 U/L (ref 35–104)
ALT SERPL-CCNC: 8 U/L (ref 0–32)
ANION GAP SERPL CALCULATED.3IONS-SCNC: 15 MMOL/L (ref 7–16)
AST SERPL-CCNC: 23 U/L (ref 0–31)
BASOPHILS # BLD: 0.04 E9/L (ref 0–0.2)
BASOPHILS NFR BLD: 0.9 % (ref 0–2)
BILIRUB SERPL-MCNC: 0.4 MG/DL (ref 0–1.2)
BUN SERPL-MCNC: 18 MG/DL (ref 6–23)
CALCIUM SERPL-MCNC: 9.2 MG/DL (ref 8.6–10.2)
CHLORIDE SERPL-SCNC: 108 MMOL/L (ref 98–107)
CHOLESTEROL, TOTAL: 134 MG/DL (ref 0–199)
CO2 SERPL-SCNC: 22 MMOL/L (ref 22–29)
CREAT SERPL-MCNC: 1.2 MG/DL (ref 0.5–1)
EOSINOPHIL # BLD: 0.12 E9/L (ref 0.05–0.5)
EOSINOPHIL NFR BLD: 2.6 % (ref 0–6)
ERYTHROCYTE [DISTWIDTH] IN BLOOD BY AUTOMATED COUNT: 13.5 FL (ref 11.5–15)
GLUCOSE SERPL-MCNC: 121 MG/DL (ref 74–99)
HCT VFR BLD AUTO: 39.3 % (ref 34–48)
HDLC SERPL-MCNC: 49 MG/DL
HGB BLD-MCNC: 12 G/DL (ref 11.5–15.5)
LDLC SERPL CALC-MCNC: 62 MG/DL (ref 0–99)
LYMPHOCYTES # BLD: 0.47 E9/L (ref 1.5–4)
LYMPHOCYTES NFR BLD: 10.3 % (ref 20–42)
MCH RBC QN AUTO: 30.1 PG (ref 26–35)
MCHC RBC AUTO-ENTMCNC: 30.5 % (ref 32–34.5)
MCV RBC AUTO: 98.5 FL (ref 80–99.9)
MONOCYTES # BLD: 0.14 E9/L (ref 0.1–0.95)
MONOCYTES NFR BLD: 3.4 % (ref 2–12)
MYELOCYTES NFR BLD MANUAL: 0.9 % (ref 0–0)
NEUTROPHILS # BLD: 3.9 E9/L (ref 1.8–7.3)
NEUTS SEG NFR BLD: 81.9 % (ref 43–80)
OVALOCYTES: ABNORMAL
PLATELET # BLD AUTO: 66 E9/L (ref 130–450)
PLATELET CONFIRMATION: NORMAL
PMV BLD AUTO: 12 FL (ref 7–12)
POIKILOCYTES: ABNORMAL
POTASSIUM SERPL-SCNC: 4.3 MMOL/L (ref 3.5–5)
PROT SERPL-MCNC: 6.5 G/DL (ref 6.4–8.3)
RBC # BLD AUTO: 3.99 E12/L (ref 3.5–5.5)
SODIUM SERPL-SCNC: 145 MMOL/L (ref 132–146)
TRIGL SERPL-MCNC: 114 MG/DL (ref 0–149)
TSH SERPL-MCNC: 1.95 UIU/ML (ref 0.27–4.2)
VITAMIN D 25-HYDROXY: 44 NG/ML (ref 30–100)
VLDLC SERPL CALC-MCNC: 23 MG/DL
WBC # BLD: 4.7 E9/L (ref 4.5–11.5)

## 2023-04-27 ENCOUNTER — OFFICE VISIT (OUTPATIENT)
Dept: PRIMARY CARE CLINIC | Age: 88
End: 2023-04-27

## 2023-04-27 VITALS
HEIGHT: 59 IN | BODY MASS INDEX: 34.27 KG/M2 | WEIGHT: 170 LBS | DIASTOLIC BLOOD PRESSURE: 76 MMHG | HEART RATE: 72 BPM | SYSTOLIC BLOOD PRESSURE: 128 MMHG | OXYGEN SATURATION: 97 %

## 2023-04-27 DIAGNOSIS — I25.10 CORONARY ARTERY DISEASE INVOLVING NATIVE CORONARY ARTERY OF NATIVE HEART WITHOUT ANGINA PECTORIS: ICD-10-CM

## 2023-04-27 DIAGNOSIS — Z86.73 HISTORY OF STROKE WITHOUT RESIDUAL DEFICITS: ICD-10-CM

## 2023-04-27 DIAGNOSIS — M15.9 PRIMARY OSTEOARTHRITIS INVOLVING MULTIPLE JOINTS: ICD-10-CM

## 2023-04-27 DIAGNOSIS — K21.9 GASTROESOPHAGEAL REFLUX DISEASE WITHOUT ESOPHAGITIS: ICD-10-CM

## 2023-04-27 DIAGNOSIS — F41.1 GENERALIZED ANXIETY DISORDER: ICD-10-CM

## 2023-04-27 DIAGNOSIS — G62.9 NEUROPATHY: ICD-10-CM

## 2023-04-27 DIAGNOSIS — D69.6 THROMBOCYTOPENIA, UNSPECIFIED (HCC): ICD-10-CM

## 2023-04-27 DIAGNOSIS — Z00.00 MEDICARE ANNUAL WELLNESS VISIT, SUBSEQUENT: Primary | ICD-10-CM

## 2023-04-27 RX ORDER — ROSUVASTATIN CALCIUM 20 MG/1
TABLET, COATED ORAL
Qty: 90 TABLET | Refills: 1 | Status: SHIPPED | OUTPATIENT
Start: 2023-04-27

## 2023-04-27 RX ORDER — HYDROCODONE BITARTRATE AND ACETAMINOPHEN 7.5; 325 MG/1; MG/1
1 TABLET ORAL EVERY 12 HOURS PRN
Qty: 60 TABLET | Refills: 0 | Status: SHIPPED | OUTPATIENT
Start: 2023-05-25 | End: 2023-06-24

## 2023-04-27 RX ORDER — SERTRALINE HYDROCHLORIDE 25 MG/1
25 TABLET, FILM COATED ORAL DAILY
Qty: 90 TABLET | Refills: 1 | Status: SHIPPED | OUTPATIENT
Start: 2023-04-27

## 2023-04-27 RX ORDER — HYDROCODONE BITARTRATE AND ACETAMINOPHEN 7.5; 325 MG/1; MG/1
1 TABLET ORAL EVERY 12 HOURS PRN
Qty: 60 TABLET | Refills: 0 | Status: SHIPPED | OUTPATIENT
Start: 2023-06-22 | End: 2023-07-22

## 2023-04-27 RX ORDER — CLOPIDOGREL BISULFATE 75 MG/1
TABLET ORAL
Qty: 90 TABLET | Refills: 1 | Status: SHIPPED | OUTPATIENT
Start: 2023-04-27

## 2023-04-27 RX ORDER — HYDROCODONE BITARTRATE AND ACETAMINOPHEN 7.5; 325 MG/1; MG/1
1 TABLET ORAL EVERY 12 HOURS PRN
Qty: 60 TABLET | Refills: 0 | Status: SHIPPED | OUTPATIENT
Start: 2023-04-27 | End: 2023-05-27

## 2023-04-27 RX ORDER — PANTOPRAZOLE SODIUM 40 MG/1
TABLET, DELAYED RELEASE ORAL
Qty: 90 TABLET | Refills: 1 | Status: SHIPPED | OUTPATIENT
Start: 2023-04-27

## 2023-04-27 RX ORDER — DULOXETIN HYDROCHLORIDE 30 MG/1
30 CAPSULE, DELAYED RELEASE ORAL DAILY
Qty: 30 CAPSULE | Refills: 0 | Status: SHIPPED | OUTPATIENT
Start: 2023-04-27

## 2023-04-27 SDOH — ECONOMIC STABILITY: HOUSING INSECURITY
IN THE LAST 12 MONTHS, WAS THERE A TIME WHEN YOU DID NOT HAVE A STEADY PLACE TO SLEEP OR SLEPT IN A SHELTER (INCLUDING NOW)?: NO

## 2023-04-27 SDOH — ECONOMIC STABILITY: FOOD INSECURITY: WITHIN THE PAST 12 MONTHS, THE FOOD YOU BOUGHT JUST DIDN'T LAST AND YOU DIDN'T HAVE MONEY TO GET MORE.: NEVER TRUE

## 2023-04-27 SDOH — ECONOMIC STABILITY: FOOD INSECURITY: WITHIN THE PAST 12 MONTHS, YOU WORRIED THAT YOUR FOOD WOULD RUN OUT BEFORE YOU GOT MONEY TO BUY MORE.: NEVER TRUE

## 2023-04-27 SDOH — ECONOMIC STABILITY: INCOME INSECURITY: HOW HARD IS IT FOR YOU TO PAY FOR THE VERY BASICS LIKE FOOD, HOUSING, MEDICAL CARE, AND HEATING?: NOT HARD AT ALL

## 2023-04-27 ASSESSMENT — LIFESTYLE VARIABLES
HOW MANY STANDARD DRINKS CONTAINING ALCOHOL DO YOU HAVE ON A TYPICAL DAY: PATIENT DOES NOT DRINK
HOW OFTEN DO YOU HAVE A DRINK CONTAINING ALCOHOL: NEVER

## 2023-04-27 ASSESSMENT — PATIENT HEALTH QUESTIONNAIRE - PHQ9
1. LITTLE INTEREST OR PLEASURE IN DOING THINGS: 1
SUM OF ALL RESPONSES TO PHQ QUESTIONS 1-9: 2
2. FEELING DOWN, DEPRESSED OR HOPELESS: 1
SUM OF ALL RESPONSES TO PHQ QUESTIONS 1-9: 2
SUM OF ALL RESPONSES TO PHQ9 QUESTIONS 1 & 2: 2
SUM OF ALL RESPONSES TO PHQ QUESTIONS 1-9: 2
SUM OF ALL RESPONSES TO PHQ QUESTIONS 1-9: 2

## 2023-04-27 NOTE — PROGRESS NOTES
Medicare Annual Wellness Visit    Seema Nj is here for Medicare AWV    Assessment & Plan   Medicare annual wellness visit, subsequent  HRA reviewed and addressed. UTD on HM. Labs reviewed in detail. Coronary artery disease involving native coronary artery of native heart without angina pectoris  -     clopidogrel (PLAVIX) 75 MG tablet; TAKE ONE TABLET BY MOUTH DAILY, Disp-90 tablet, R-1Normal  -     rosuvastatin (CRESTOR) 20 MG tablet; TAKE ONE TABLET BY MOUTH DAILY, Disp-90 tablet, R-1Normal    History of stroke without residual deficits  -     clopidogrel (PLAVIX) 75 MG tablet; TAKE ONE TABLET BY MOUTH DAILY, Disp-90 tablet, R-1Normal    Generalized anxiety disorder  -     sertraline (ZOLOFT) 25 MG tablet; Take 1 tablet by mouth daily, Disp-90 tablet, R-1Normal    Gastroesophageal reflux disease without esophagitis  -     pantoprazole (PROTONIX) 40 MG tablet; TAKE ONE TABLET BY MOUTH DAILY, Disp-90 tablet, R-1Normal    Primary osteoarthritis involving multiple joints  -     HYDROcodone-acetaminophen (NORCO) 7.5-325 MG per tablet; Take 1 tablet by mouth every 12 hours as needed for Pain for up to 30 days. Intended supply: 30 days, Disp-60 tablet, R-0Normal  -     HYDROcodone-acetaminophen (NORCO) 7.5-325 MG per tablet; Take 1 tablet by mouth every 12 hours as needed for Pain for up to 30 days. Intended supply: 30 days, Disp-60 tablet, R-0Normal  -     HYDROcodone-acetaminophen (NORCO) 7.5-325 MG per tablet; Take 1 tablet by mouth every 12 hours as needed for Pain for up to 30 days. Intended supply: 30 days, Disp-60 tablet, R-0Normal  OARRS appropriate    Neuropathy  -     DULoxetine (CYMBALTA) 30 MG extended release capsule; Take 1 capsule by mouth daily, Disp-30 capsule, R-0Normal  Will try using Cymbalta for pain/numbness control. Thrombocytopenia, unspecified  -     CBC with Auto Differential; Future   Stable. Continue to check CBC every 3 months.         Recommendations for Preventive

## 2023-07-26 ASSESSMENT — ENCOUNTER SYMPTOMS
SHORTNESS OF BREATH: 0
ABDOMINAL PAIN: 0
VOMITING: 0
COUGH: 0
BACK PAIN: 1
DIARRHEA: 0
NAUSEA: 0
WHEEZING: 0
CONSTIPATION: 0

## 2023-07-27 ENCOUNTER — OFFICE VISIT (OUTPATIENT)
Dept: PRIMARY CARE CLINIC | Age: 88
End: 2023-07-27

## 2023-07-27 VITALS
DIASTOLIC BLOOD PRESSURE: 76 MMHG | HEIGHT: 59 IN | OXYGEN SATURATION: 93 % | BODY MASS INDEX: 33.47 KG/M2 | TEMPERATURE: 97.1 F | HEART RATE: 78 BPM | WEIGHT: 166 LBS | SYSTOLIC BLOOD PRESSURE: 112 MMHG

## 2023-07-27 DIAGNOSIS — F41.1 GENERALIZED ANXIETY DISORDER: ICD-10-CM

## 2023-07-27 DIAGNOSIS — F02.818 EARLY ONSET ALZHEIMER'S DISEASE WITH BEHAVIORAL DISTURBANCE (HCC): ICD-10-CM

## 2023-07-27 DIAGNOSIS — G30.0 EARLY ONSET ALZHEIMER'S DISEASE WITH BEHAVIORAL DISTURBANCE (HCC): ICD-10-CM

## 2023-07-27 DIAGNOSIS — N18.32 CHRONIC RENAL IMPAIRMENT, STAGE 3B (HCC): ICD-10-CM

## 2023-07-27 DIAGNOSIS — Z86.73 HISTORY OF STROKE WITHOUT RESIDUAL DEFICITS: ICD-10-CM

## 2023-07-27 DIAGNOSIS — K21.9 GASTROESOPHAGEAL REFLUX DISEASE WITHOUT ESOPHAGITIS: ICD-10-CM

## 2023-07-27 DIAGNOSIS — D69.6 THROMBOCYTOPENIA, UNSPECIFIED (HCC): ICD-10-CM

## 2023-07-27 DIAGNOSIS — I25.10 CORONARY ARTERY DISEASE INVOLVING NATIVE CORONARY ARTERY OF NATIVE HEART WITHOUT ANGINA PECTORIS: Primary | ICD-10-CM

## 2023-07-27 DIAGNOSIS — L89.311 PRESSURE INJURY OF RIGHT BUTTOCK, STAGE 1: ICD-10-CM

## 2023-07-27 DIAGNOSIS — M15.9 PRIMARY OSTEOARTHRITIS INVOLVING MULTIPLE JOINTS: ICD-10-CM

## 2023-07-27 RX ORDER — HYDROCODONE BITARTRATE AND ACETAMINOPHEN 7.5; 325 MG/1; MG/1
1 TABLET ORAL EVERY 12 HOURS PRN
Qty: 60 TABLET | Refills: 0 | Status: SHIPPED | OUTPATIENT
Start: 2023-08-24 | End: 2023-09-23

## 2023-07-27 RX ORDER — HYDROCODONE BITARTRATE AND ACETAMINOPHEN 7.5; 325 MG/1; MG/1
1 TABLET ORAL EVERY 12 HOURS PRN
Qty: 60 TABLET | Refills: 0 | Status: SHIPPED | OUTPATIENT
Start: 2023-09-21 | End: 2023-10-21

## 2023-07-27 RX ORDER — HYDROCODONE BITARTRATE AND ACETAMINOPHEN 7.5; 325 MG/1; MG/1
1 TABLET ORAL EVERY 12 HOURS PRN
Qty: 60 TABLET | Refills: 0 | Status: SHIPPED | OUTPATIENT
Start: 2023-07-27 | End: 2023-08-26

## 2023-07-27 RX ORDER — SERTRALINE HYDROCHLORIDE 25 MG/1
25 TABLET, FILM COATED ORAL DAILY
Qty: 90 TABLET | Refills: 1 | Status: SHIPPED | OUTPATIENT
Start: 2023-07-27

## 2023-07-27 RX ORDER — PANTOPRAZOLE SODIUM 40 MG/1
TABLET, DELAYED RELEASE ORAL
Qty: 90 TABLET | Refills: 1 | Status: SHIPPED | OUTPATIENT
Start: 2023-07-27

## 2023-07-27 RX ORDER — CLOPIDOGREL BISULFATE 75 MG/1
TABLET ORAL
Qty: 90 TABLET | Refills: 1 | Status: SHIPPED | OUTPATIENT
Start: 2023-07-27

## 2023-07-27 RX ORDER — DONEPEZIL HYDROCHLORIDE 10 MG/1
10 TABLET, FILM COATED ORAL NIGHTLY
Qty: 90 TABLET | Refills: 1 | Status: SHIPPED | OUTPATIENT
Start: 2023-07-27

## 2023-08-14 ENCOUNTER — PROCEDURE VISIT (OUTPATIENT)
Dept: PODIATRY | Age: 88
End: 2023-08-14
Payer: MEDICARE

## 2023-08-14 VITALS — WEIGHT: 166 LBS | BODY MASS INDEX: 33.53 KG/M2 | TEMPERATURE: 98.2 F

## 2023-08-14 DIAGNOSIS — G62.9 NEUROPATHY: ICD-10-CM

## 2023-08-14 DIAGNOSIS — M79.674 PAIN IN TOE OF RIGHT FOOT: ICD-10-CM

## 2023-08-14 DIAGNOSIS — M79.675 PAIN IN LEFT TOE(S): ICD-10-CM

## 2023-08-14 DIAGNOSIS — B35.1 TINEA UNGUIUM: Primary | ICD-10-CM

## 2023-08-14 DIAGNOSIS — R26.2 DIFFICULTY WALKING: ICD-10-CM

## 2023-08-14 DIAGNOSIS — I73.9 PERIPHERAL VASCULAR DISEASE, UNSPECIFIED (HCC): ICD-10-CM

## 2023-08-14 PROCEDURE — 11721 DEBRIDE NAIL 6 OR MORE: CPT | Performed by: PODIATRIST

## 2023-08-14 ASSESSMENT — ENCOUNTER SYMPTOMS
EYES NEGATIVE: 1
BACK PAIN: 1

## 2023-08-14 NOTE — PROGRESS NOTES
Date of Service: 06/04/2018    HISTORY OF PRESENT ILLNESS:   Taj Dawn is a 61-year-old gentleman who is status post left brachiocephalic AV fistula. He is doing well. PHYSICAL EXAMINATION:   Incisions are well-healed. There is no evidence of infection or hematoma. ASSESSMENT AND PLAN:   This will be ready for use 07/15/2018. I will see him back on a p.r.n. basis. Dictated By: Saeed Mcguire MD  Signing Provider: Saeed Wetzel.  MD ERASTO Maciel/MARI ([id number])  DD: 06/04/2018 08:48:24 TD: 06/04/2018 22:49:01    Copy Sent To: mouth nightly 90 tablet 1    pantoprazole (PROTONIX) 40 MG tablet TAKE ONE TABLET BY MOUTH DAILY 90 tablet 1    rosuvastatin (CRESTOR) 20 MG tablet TAKE ONE TABLET BY MOUTH DAILY 90 tablet 1    DULoxetine (CYMBALTA) 30 MG extended release capsule Take 1 capsule by mouth daily 30 capsule 0    ketoconazole (NIZORAL) 2 % shampoo Apply topically daily as needed for Itching Apply topically daily as needed. 120 mL 5    ketoconazole (NIZORAL) 2 % cream Apply topically daily Apply topically daily. 30 g 5    albuterol sulfate HFA (VENTOLIN HFA) 108 (90 Base) MCG/ACT inhaler Inhale 2 puffs into the lungs 4 times daily as needed for Wheezing 54 g 1    aspirin 325 MG tablet Take 1 tablet by mouth every morning (before breakfast)       No current facility-administered medications on file prior to visit. Review of Systems   Constitutional: Negative. HENT: Negative. Eyes: Negative. Endocrine: Negative. Genitourinary: Negative. Musculoskeletal:  Positive for arthralgias, back pain, gait problem and joint swelling. Objective:  General: AAO x 3 in NAD.     Derm  Toenail Description  Sites of Onychomycosis Involvement (Check affected area)  [x] [x] [x] [x] [x] [x] [x] [x] [x] [x]  5 4 3 2 1 1 2 3 4 5                          Right                                        Left    Thickness  [x] [x] [x] [x] [x] [x] [x] [x] [x] [x]  5 4 3 2 1 1 2 3 4 5                         Right                                        Left    Dystrophic Changes   [x] [x] [x] [x] [x] [x] [x] [x] [x] [x]  5 4 3 2 1 1 2 3 4 5                         Right                                        Left    Color  [x] [x] [x] [x] [x] [x] [x] [x] [x] [x]  5 4 3 2 1 1 2 3 4 5                          Right                                        Left    Incurvation/Ingrowin   [x] [x] [x] [x] [x] [x] [x] [x] [x] [x]  5 4 3 2 1 1 2 3 4 5                         Right                                        Left    Inflammation/Pain

## 2024-12-23 ENCOUNTER — APPOINTMENT (OUTPATIENT)
Dept: CT IMAGING | Age: 88
End: 2024-12-23
Payer: MEDICARE

## 2024-12-23 ENCOUNTER — HOSPITAL ENCOUNTER (EMERGENCY)
Age: 88
Discharge: HOME | End: 2024-12-23
Attending: EMERGENCY MEDICINE
Payer: MEDICARE

## 2024-12-23 VITALS
SYSTOLIC BLOOD PRESSURE: 153 MMHG | DIASTOLIC BLOOD PRESSURE: 74 MMHG | WEIGHT: 165 LBS | OXYGEN SATURATION: 97 % | RESPIRATION RATE: 17 BRPM | HEART RATE: 95 BPM | TEMPERATURE: 97.9 F | BODY MASS INDEX: 33.26 KG/M2 | HEIGHT: 59 IN

## 2024-12-23 DIAGNOSIS — S31.000A WOUND OF SACRAL REGION, INITIAL ENCOUNTER: Primary | ICD-10-CM

## 2024-12-23 LAB
ALBUMIN SERPL-MCNC: 4 G/DL (ref 3.5–5.2)
ALP SERPL-CCNC: 66 U/L (ref 35–104)
ALT SERPL-CCNC: 6 U/L (ref 0–32)
ANION GAP SERPL CALCULATED.3IONS-SCNC: 11 MMOL/L (ref 7–16)
AST SERPL-CCNC: 15 U/L (ref 0–31)
BASOPHILS # BLD: 0.02 K/UL (ref 0–0.2)
BASOPHILS NFR BLD: 0 % (ref 0–2)
BILIRUB SERPL-MCNC: 0.4 MG/DL (ref 0–1.2)
BUN SERPL-MCNC: 19 MG/DL (ref 6–23)
CALCIUM SERPL-MCNC: 9.4 MG/DL (ref 8.6–10.2)
CHLORIDE SERPL-SCNC: 103 MMOL/L (ref 98–107)
CO2 SERPL-SCNC: 28 MMOL/L (ref 22–29)
CREAT SERPL-MCNC: 1.4 MG/DL (ref 0.5–1)
EOSINOPHIL # BLD: 0.04 K/UL (ref 0.05–0.5)
EOSINOPHILS RELATIVE PERCENT: 1 % (ref 0–6)
ERYTHROCYTE [DISTWIDTH] IN BLOOD BY AUTOMATED COUNT: 13.8 % (ref 11.5–15)
GFR, ESTIMATED: 35 ML/MIN/1.73M2
GLUCOSE SERPL-MCNC: 99 MG/DL (ref 74–99)
HCT VFR BLD AUTO: 38.9 % (ref 34–48)
HGB BLD-MCNC: 11.9 G/DL (ref 11.5–15.5)
IMM GRANULOCYTES # BLD AUTO: 0.04 K/UL (ref 0–0.58)
IMM GRANULOCYTES NFR BLD: 1 % (ref 0–5)
LACTATE BLDV-SCNC: 1.1 MMOL/L (ref 0.5–1.9)
LACTATE BLDV-SCNC: 1.4 MMOL/L (ref 0.5–1.9)
LYMPHOCYTES NFR BLD: 0.88 K/UL (ref 1.5–4)
LYMPHOCYTES RELATIVE PERCENT: 15 % (ref 20–42)
MCH RBC QN AUTO: 30 PG (ref 26–35)
MCHC RBC AUTO-ENTMCNC: 30.6 G/DL (ref 32–34.5)
MCV RBC AUTO: 98 FL (ref 80–99.9)
MONOCYTES NFR BLD: 0.62 K/UL (ref 0.1–0.95)
MONOCYTES NFR BLD: 11 % (ref 2–12)
NEUTROPHILS NFR BLD: 72 % (ref 43–80)
NEUTS SEG NFR BLD: 4.16 K/UL (ref 1.8–7.3)
PLATELET CONFIRMATION: NORMAL
PLATELET, FLUORESCENCE: 66 K/UL (ref 130–450)
PMV BLD AUTO: 11.4 FL (ref 7–12)
POTASSIUM SERPL-SCNC: 4.3 MMOL/L (ref 3.5–5)
PROT SERPL-MCNC: 7.1 G/DL (ref 6.4–8.3)
RBC # BLD AUTO: 3.97 M/UL (ref 3.5–5.5)
SODIUM SERPL-SCNC: 142 MMOL/L (ref 132–146)
WBC OTHER # BLD: 5.8 K/UL (ref 4.5–11.5)

## 2024-12-23 PROCEDURE — 87150 DNA/RNA AMPLIFIED PROBE: CPT

## 2024-12-23 PROCEDURE — 80053 COMPREHEN METABOLIC PANEL: CPT

## 2024-12-23 PROCEDURE — 74176 CT ABD & PELVIS W/O CONTRAST: CPT

## 2024-12-23 PROCEDURE — 83605 ASSAY OF LACTIC ACID: CPT

## 2024-12-23 PROCEDURE — 85025 COMPLETE CBC W/AUTO DIFF WBC: CPT

## 2024-12-23 PROCEDURE — 99284 EMERGENCY DEPT VISIT MOD MDM: CPT

## 2024-12-23 PROCEDURE — 6370000000 HC RX 637 (ALT 250 FOR IP)

## 2024-12-23 PROCEDURE — 87040 BLOOD CULTURE FOR BACTERIA: CPT

## 2024-12-23 RX ORDER — CEFDINIR 300 MG/1
300 CAPSULE ORAL DAILY
Qty: 7 CAPSULE | Refills: 0 | Status: SHIPPED | OUTPATIENT
Start: 2024-12-23 | End: 2024-12-25 | Stop reason: ALTCHOICE

## 2024-12-23 RX ORDER — CEFDINIR 300 MG/1
300 CAPSULE ORAL ONCE
Status: COMPLETED | OUTPATIENT
Start: 2024-12-23 | End: 2024-12-23

## 2024-12-23 RX ORDER — DOXYCYCLINE HYCLATE 100 MG
100 TABLET ORAL 2 TIMES DAILY
Qty: 14 TABLET | Refills: 0 | Status: SHIPPED | OUTPATIENT
Start: 2024-12-23 | End: 2024-12-25 | Stop reason: ALTCHOICE

## 2024-12-23 RX ORDER — DOXYCYCLINE 100 MG/1
100 CAPSULE ORAL ONCE
Status: COMPLETED | OUTPATIENT
Start: 2024-12-23 | End: 2024-12-23

## 2024-12-23 RX ADMIN — CEFDINIR 300 MG: 300 CAPSULE ORAL at 22:15

## 2024-12-23 RX ADMIN — DOXYCYCLINE HYCLATE 100 MG: 100 CAPSULE ORAL at 22:15

## 2024-12-23 ASSESSMENT — PAIN - FUNCTIONAL ASSESSMENT: PAIN_FUNCTIONAL_ASSESSMENT: 0-10

## 2024-12-23 ASSESSMENT — LIFESTYLE VARIABLES
HOW OFTEN DO YOU HAVE A DRINK CONTAINING ALCOHOL: NEVER
HOW MANY STANDARD DRINKS CONTAINING ALCOHOL DO YOU HAVE ON A TYPICAL DAY: PATIENT DOES NOT DRINK

## 2024-12-23 ASSESSMENT — PAIN SCALES - GENERAL: PAINLEVEL_OUTOF10: 6

## 2024-12-23 NOTE — ED PROVIDER NOTES
[facility] EMERGENCY DEPARTMENT  EMERGENCY DEPARTMENT ENCOUNTER        Pt Name: Arlette Ramon  MRN: [id number]  Birthdate 2/13/1929  Date of evaluation: 12/23/2024  Provider: Sue Vincent MD  PCP: Lisha Asher DO  Note Started: 5:12 PM EST 12/23/24    CHIEF COMPLAINT       Chief Complaint   Patient presents with    Wound Check       HISTORY OF PRESENT ILLNESS: 1 or more Elements   History From: Patient and son    Limitations to history : None    Arlette Ramon is a 89 y.o. female who presents for sacral wound that has been worsened over the past week.  Patient's son states she has had a sacral wound in the past that seem to improve and resolve however over the past week has returned.  She has no fevers reported.  Denies history of diabetes.  She does endorse some mild generalized abdominal pain.  Denies chest pain, shortness of breath.  Patient does not walk well at baseline and uses a power chair to get around.  She does have home health    Nursing Notes were all reviewed and agreed with or any disagreements were addressed in the HPI.        REVIEW OF EXTERNAL NOTE :       Patient was last seen in office by her PCP on 7/27/2023 for CAD, history of stroke, Alzheimer's, generalized anxiety disorder, GERD, chronic renal impairment, osteoarthritis, pressure injury of right buttock stage I, thrombocytopenia    REVIEW OF SYSTEMS :           Positives and Pertinent negatives as per HPI.     SURGICAL HISTORY     Past Surgical History:   Procedure Laterality Date    BARTHOLIN GLAND CYST EXCISION      CARPAL TUNNEL RELEASE      CATARACT REMOVAL      COLECTOMY      with colostomy    DIAGNOSTIC CARDIAC CATH LAB PROCEDURE      drug eluting stent proximal RCA and diagonal branch    HYSTERECTOMY (CERVIX STATUS UNKNOWN)      LUMBAR LAMINECTOMY         CURRENTMEDICATIONS       Discharge Medication List as of 12/23/2024 10:24 PM        CONTINUE these medications which have NOT

## 2024-12-24 NOTE — ED NOTES
Discharge instructions discussed and reviewed with the patient.  She verbalized understanding and has no questions or concerns at time of discharge. Peripheral IV removed. Patient helped to car by this RN, and another.

## 2024-12-25 ENCOUNTER — HOSPITAL ENCOUNTER (OUTPATIENT)
Age: 88
Setting detail: OBSERVATION
Discharge: HOME HEALTH | End: 2024-12-26
Attending: STUDENT IN AN ORGANIZED HEALTH CARE EDUCATION/TRAINING PROGRAM | Admitting: STUDENT IN AN ORGANIZED HEALTH CARE EDUCATION/TRAINING PROGRAM
Payer: MEDICARE

## 2024-12-25 DIAGNOSIS — R78.81 MRSA BACTEREMIA: Primary | ICD-10-CM

## 2024-12-25 DIAGNOSIS — B95.62 MRSA BACTEREMIA: Primary | ICD-10-CM

## 2024-12-25 LAB
ACB COMPLEX DNA BLD POS QL NAA+NON-PROBE: NOT DETECTED
ANION GAP SERPL CALCULATED.3IONS-SCNC: 12 MMOL/L (ref 7–16)
B FRAGILIS DNA BLD POS QL NAA+NON-PROBE: NOT DETECTED
BASOPHILS # BLD: 0.03 K/UL (ref 0–0.2)
BASOPHILS NFR BLD: 0 % (ref 0–2)
BIOFIRE TEST COMMENT: ABNORMAL
BUN SERPL-MCNC: 22 MG/DL (ref 6–23)
C ALBICANS DNA BLD POS QL NAA+NON-PROBE: NOT DETECTED
C AURIS DNA BLD POS QL NAA+NON-PROBE: NOT DETECTED
C GATTII+NEOFOR DNA BLD POS QL NAA+N-PRB: NOT DETECTED
C GLABRATA DNA BLD POS QL NAA+NON-PROBE: NOT DETECTED
C KRUSEI DNA BLD POS QL NAA+NON-PROBE: NOT DETECTED
C PARAP DNA BLD POS QL NAA+NON-PROBE: NOT DETECTED
C TROPICLS DNA BLD POS QL NAA+NON-PROBE: NOT DETECTED
CALCIUM SERPL-MCNC: 9.3 MG/DL (ref 8.6–10.2)
CHLORIDE SERPL-SCNC: 103 MMOL/L (ref 98–107)
CO2 SERPL-SCNC: 27 MMOL/L (ref 22–29)
CREAT SERPL-MCNC: 1.4 MG/DL (ref 0.5–1)
E CLOAC COMP DNA BLD POS NAA+NON-PROBE: NOT DETECTED
E COLI DNA BLD POS QL NAA+NON-PROBE: NOT DETECTED
E FAECALIS DNA BLD POS QL NAA+NON-PROBE: NOT DETECTED
E FAECIUM DNA BLD POS QL NAA+NON-PROBE: NOT DETECTED
ENTEROBACTERALES DNA BLD POS NAA+N-PRB: NOT DETECTED
EOSINOPHIL # BLD: 0.03 K/UL (ref 0.05–0.5)
EOSINOPHILS RELATIVE PERCENT: 0 % (ref 0–6)
ERYTHROCYTE [DISTWIDTH] IN BLOOD BY AUTOMATED COUNT: 14 % (ref 11.5–15)
GFR, ESTIMATED: 36 ML/MIN/1.73M2
GLUCOSE SERPL-MCNC: 133 MG/DL (ref 74–99)
GP B STREP DNA BLD POS QL NAA+NON-PROBE: NOT DETECTED
HAEM INFLU DNA BLD POS QL NAA+NON-PROBE: NOT DETECTED
HCT VFR BLD AUTO: 36.5 % (ref 34–48)
HGB BLD-MCNC: 11.6 G/DL (ref 11.5–15.5)
IMM GRANULOCYTES # BLD AUTO: 0.04 K/UL (ref 0–0.58)
IMM GRANULOCYTES NFR BLD: 1 % (ref 0–5)
K OXYTOCA DNA BLD POS QL NAA+NON-PROBE: NOT DETECTED
KLEBSIELLA SP DNA BLD POS QL NAA+NON-PRB: NOT DETECTED
KLEBSIELLA SP DNA BLD POS QL NAA+NON-PRB: NOT DETECTED
L MONOCYTOG DNA BLD POS QL NAA+NON-PROBE: NOT DETECTED
LACTATE BLDV-SCNC: 2 MMOL/L (ref 0.5–1.9)
LYMPHOCYTES NFR BLD: 0.79 K/UL (ref 1.5–4)
LYMPHOCYTES RELATIVE PERCENT: 11 % (ref 20–42)
MCH RBC QN AUTO: 30.2 PG (ref 26–35)
MCHC RBC AUTO-ENTMCNC: 31.8 G/DL (ref 32–34.5)
MCV RBC AUTO: 95.1 FL (ref 80–99.9)
MECA+MECC ISLT/SPM QL: DETECTED
MICROORGANISM SPEC CULT: ABNORMAL
MICROORGANISM/AGENT SPEC: ABNORMAL
MONOCYTES NFR BLD: 0.78 K/UL (ref 0.1–0.95)
MONOCYTES NFR BLD: 11 % (ref 2–12)
N MEN DNA BLD POS QL NAA+NON-PROBE: NOT DETECTED
NEUTROPHILS NFR BLD: 76 % (ref 43–80)
NEUTS SEG NFR BLD: 5.41 K/UL (ref 1.8–7.3)
P AERUGINOSA DNA BLD POS NAA+NON-PROBE: NOT DETECTED
PLATELET CONFIRMATION: NORMAL
PLATELET, FLUORESCENCE: 70 K/UL (ref 130–450)
PMV BLD AUTO: 11.7 FL (ref 7–12)
POTASSIUM SERPL-SCNC: 4.3 MMOL/L (ref 3.5–5)
PROTEUS SP DNA BLD POS QL NAA+NON-PROBE: NOT DETECTED
RBC # BLD AUTO: 3.84 M/UL (ref 3.5–5.5)
S AUREUS DNA BLD POS QL NAA+NON-PROBE: NOT DETECTED
S AUREUS+CONS DNA BLD POS NAA+NON-PROBE: DETECTED
S EPIDERMIDIS DNA BLD POS QL NAA+NON-PRB: DETECTED
S LUGDUNENSIS DNA BLD POS QL NAA+NON-PRB: NOT DETECTED
S MALTOPHILIA DNA BLD POS QL NAA+NON-PRB: NOT DETECTED
S MARCESCENS DNA BLD POS NAA+NON-PROBE: NOT DETECTED
S PNEUM DNA BLD POS QL NAA+NON-PROBE: NOT DETECTED
S PYO DNA BLD POS QL NAA+NON-PROBE: NOT DETECTED
SALMONELLA DNA BLD POS QL NAA+NON-PROBE: NOT DETECTED
SERVICE CMNT-IMP: ABNORMAL
SODIUM SERPL-SCNC: 142 MMOL/L (ref 132–146)
SPECIMEN DESCRIPTION: ABNORMAL
STREPTOCOCCUS DNA BLD POS NAA+NON-PROBE: NOT DETECTED
WBC OTHER # BLD: 7.1 K/UL (ref 4.5–11.5)

## 2024-12-25 PROCEDURE — G0378 HOSPITAL OBSERVATION PER HR: HCPCS

## 2024-12-25 PROCEDURE — 80048 BASIC METABOLIC PNL TOTAL CA: CPT

## 2024-12-25 PROCEDURE — 96365 THER/PROPH/DIAG IV INF INIT: CPT

## 2024-12-25 PROCEDURE — 96366 THER/PROPH/DIAG IV INF ADDON: CPT

## 2024-12-25 PROCEDURE — 2580000003 HC RX 258

## 2024-12-25 PROCEDURE — 6360000002 HC RX W HCPCS: Performed by: STUDENT IN AN ORGANIZED HEALTH CARE EDUCATION/TRAINING PROGRAM

## 2024-12-25 PROCEDURE — 85025 COMPLETE CBC W/AUTO DIFF WBC: CPT

## 2024-12-25 PROCEDURE — 96372 THER/PROPH/DIAG INJ SC/IM: CPT

## 2024-12-25 PROCEDURE — 83605 ASSAY OF LACTIC ACID: CPT

## 2024-12-25 PROCEDURE — 6370000000 HC RX 637 (ALT 250 FOR IP): Performed by: STUDENT IN AN ORGANIZED HEALTH CARE EDUCATION/TRAINING PROGRAM

## 2024-12-25 PROCEDURE — 6360000002 HC RX W HCPCS

## 2024-12-25 PROCEDURE — 87040 BLOOD CULTURE FOR BACTERIA: CPT

## 2024-12-25 PROCEDURE — 2500000003 HC RX 250 WO HCPCS: Performed by: STUDENT IN AN ORGANIZED HEALTH CARE EDUCATION/TRAINING PROGRAM

## 2024-12-25 PROCEDURE — 99285 EMERGENCY DEPT VISIT HI MDM: CPT

## 2024-12-25 RX ORDER — ACETAMINOPHEN 325 MG/1
650 TABLET ORAL EVERY 6 HOURS PRN
Status: DISCONTINUED | OUTPATIENT
Start: 2024-12-25 | End: 2024-12-26 | Stop reason: HOSPADM

## 2024-12-25 RX ORDER — HYDROCODONE BITARTRATE AND ACETAMINOPHEN 5; 325 MG/1; MG/1
1 TABLET ORAL EVERY 12 HOURS PRN
COMMUNITY

## 2024-12-25 RX ORDER — ACETAMINOPHEN 650 MG/1
650 SUPPOSITORY RECTAL EVERY 6 HOURS PRN
Status: DISCONTINUED | OUTPATIENT
Start: 2024-12-25 | End: 2024-12-26 | Stop reason: HOSPADM

## 2024-12-25 RX ORDER — ASPIRIN 325 MG
325 TABLET ORAL
Status: DISCONTINUED | OUTPATIENT
Start: 2024-12-26 | End: 2024-12-26 | Stop reason: HOSPADM

## 2024-12-25 RX ORDER — ROSUVASTATIN CALCIUM 10 MG/1
10 TABLET, COATED ORAL NIGHTLY
Status: DISCONTINUED | OUTPATIENT
Start: 2024-12-25 | End: 2024-12-26 | Stop reason: HOSPADM

## 2024-12-25 RX ORDER — SODIUM CHLORIDE 9 MG/ML
INJECTION, SOLUTION INTRAVENOUS PRN
Status: DISCONTINUED | OUTPATIENT
Start: 2024-12-25 | End: 2024-12-26 | Stop reason: HOSPADM

## 2024-12-25 RX ORDER — ROSUVASTATIN CALCIUM 20 MG/1
20 TABLET, COATED ORAL NIGHTLY
Status: DISCONTINUED | OUTPATIENT
Start: 2024-12-25 | End: 2024-12-25 | Stop reason: DRUGHIGH

## 2024-12-25 RX ORDER — SODIUM CHLORIDE 0.9 % (FLUSH) 0.9 %
5-40 SYRINGE (ML) INJECTION EVERY 12 HOURS SCHEDULED
Status: DISCONTINUED | OUTPATIENT
Start: 2024-12-25 | End: 2024-12-26 | Stop reason: HOSPADM

## 2024-12-25 RX ORDER — ENOXAPARIN SODIUM 100 MG/ML
30 INJECTION SUBCUTANEOUS DAILY
Status: DISCONTINUED | OUTPATIENT
Start: 2024-12-25 | End: 2024-12-26 | Stop reason: HOSPADM

## 2024-12-25 RX ORDER — ONDANSETRON 4 MG/1
4 TABLET, ORALLY DISINTEGRATING ORAL EVERY 8 HOURS PRN
Status: DISCONTINUED | OUTPATIENT
Start: 2024-12-25 | End: 2024-12-26 | Stop reason: HOSPADM

## 2024-12-25 RX ORDER — ALBUTEROL SULFATE 90 UG/1
2 INHALANT RESPIRATORY (INHALATION) 4 TIMES DAILY PRN
Status: DISCONTINUED | OUTPATIENT
Start: 2024-12-25 | End: 2024-12-25 | Stop reason: CLARIF

## 2024-12-25 RX ORDER — DONEPEZIL HYDROCHLORIDE 10 MG/1
10 TABLET, FILM COATED ORAL NIGHTLY
Status: DISCONTINUED | OUTPATIENT
Start: 2024-12-25 | End: 2024-12-26 | Stop reason: HOSPADM

## 2024-12-25 RX ORDER — TORSEMIDE 10 MG/1
5 TABLET ORAL 2 TIMES DAILY
COMMUNITY

## 2024-12-25 RX ORDER — HYDROCODONE BITARTRATE AND ACETAMINOPHEN 7.5; 325 MG/1; MG/1
1 TABLET ORAL EVERY 12 HOURS PRN
Status: DISCONTINUED | OUTPATIENT
Start: 2024-12-25 | End: 2024-12-26 | Stop reason: HOSPADM

## 2024-12-25 RX ORDER — MICONAZOLE NITRATE 20 MG/G
CREAM TOPICAL 2 TIMES DAILY
Status: DISCONTINUED | OUTPATIENT
Start: 2024-12-25 | End: 2024-12-26 | Stop reason: HOSPADM

## 2024-12-25 RX ORDER — DULOXETIN HYDROCHLORIDE 30 MG/1
30 CAPSULE, DELAYED RELEASE ORAL DAILY
Status: DISCONTINUED | OUTPATIENT
Start: 2024-12-26 | End: 2024-12-26 | Stop reason: HOSPADM

## 2024-12-25 RX ORDER — POLYETHYLENE GLYCOL 3350 17 G/17G
17 POWDER, FOR SOLUTION ORAL DAILY PRN
Status: DISCONTINUED | OUTPATIENT
Start: 2024-12-25 | End: 2024-12-26 | Stop reason: HOSPADM

## 2024-12-25 RX ORDER — SODIUM CHLORIDE 0.9 % (FLUSH) 0.9 %
5-40 SYRINGE (ML) INJECTION PRN
Status: DISCONTINUED | OUTPATIENT
Start: 2024-12-25 | End: 2024-12-26 | Stop reason: HOSPADM

## 2024-12-25 RX ORDER — ONDANSETRON 2 MG/ML
4 INJECTION INTRAMUSCULAR; INTRAVENOUS EVERY 6 HOURS PRN
Status: DISCONTINUED | OUTPATIENT
Start: 2024-12-25 | End: 2024-12-26 | Stop reason: HOSPADM

## 2024-12-25 RX ORDER — TORSEMIDE 10 MG/1
5 TABLET ORAL DAILY
Status: DISCONTINUED | OUTPATIENT
Start: 2024-12-26 | End: 2024-12-26 | Stop reason: HOSPADM

## 2024-12-25 RX ORDER — ALBUTEROL SULFATE 0.83 MG/ML
2.5 SOLUTION RESPIRATORY (INHALATION) 4 TIMES DAILY PRN
Status: DISCONTINUED | OUTPATIENT
Start: 2024-12-25 | End: 2024-12-26 | Stop reason: HOSPADM

## 2024-12-25 RX ORDER — PANTOPRAZOLE SODIUM 40 MG/1
40 TABLET, DELAYED RELEASE ORAL
Status: DISCONTINUED | OUTPATIENT
Start: 2024-12-26 | End: 2024-12-26 | Stop reason: HOSPADM

## 2024-12-25 RX ORDER — CLOPIDOGREL BISULFATE 75 MG/1
75 TABLET ORAL DAILY
Status: DISCONTINUED | OUTPATIENT
Start: 2024-12-26 | End: 2024-12-26 | Stop reason: HOSPADM

## 2024-12-25 RX ADMIN — ROSUVASTATIN CALCIUM 10 MG: 10 TABLET, FILM COATED ORAL at 23:44

## 2024-12-25 RX ADMIN — SODIUM CHLORIDE, PRESERVATIVE FREE 10 ML: 5 INJECTION INTRAVENOUS at 21:39

## 2024-12-25 RX ADMIN — DONEPEZIL HYDROCHLORIDE 10 MG: 10 TABLET, FILM COATED ORAL at 23:44

## 2024-12-25 RX ADMIN — ENOXAPARIN SODIUM 30 MG: 100 INJECTION SUBCUTANEOUS at 21:39

## 2024-12-25 RX ADMIN — VANCOMYCIN HYDROCHLORIDE 1500 MG: 500 INJECTION, POWDER, LYOPHILIZED, FOR SOLUTION INTRAVENOUS at 21:43

## 2024-12-25 RX ADMIN — MICONAZOLE NITRATE: 20 CREAM TOPICAL at 23:45

## 2024-12-25 ASSESSMENT — PAIN SCALES - GENERAL: PAINLEVEL_OUTOF10: 0

## 2024-12-25 NOTE — H&P
tomorrow  Sacral wound- wound care c/s  Hx CVA, no residual deficits- cont DAPT  CAD- cont statin, DAPT  MAGDY- cont meds  CKD3- baseline Cr 1.1-1.3, monitor  Vascular dementia- cont aricept  Chronic LBP- cont Norco  BLE edema- cont 5mg torsemide    Code Status: Full  DVT prophylaxis: lovenox      NOTE: Portions of this report may have been transcribed using voice recognition software. Every effort was made to ensure accuracy; however, inadvertent computerized transcription errors may be present.  Electronically signed by Lilliam Mcgraw MD on 12/25/2024 at 5:38 PM

## 2024-12-25 NOTE — ED NOTES
I was alerted that the patient had positive blood cultures from her visit on 12/23/2024.  I was able to contact the patient's daughter as well as the patient's son to bring the patient back to the emergency department for IV antibiotics and admission and they are agreeable and will work on bringing her back today.

## 2024-12-25 NOTE — PROGRESS NOTES
Pharmacy Consultation Note  (Antibiotic Dosing and Monitoring)    Initial consult date: 12/25/24  Consulting physician/provider: Dr Vyas  Drug: Vancomycin  Indication: bloodstream    Age/  Gender Height Weight IBW  Allergy Information   89 y.o./female @FLOW(11:first:1)@ @FLOW[14:FIRST:1@     Ideal body weight: 48.8 kg (107 lb 8.4 oz)  Adjusted ideal body weight: 59 kg (130 lb 1.8 oz)   Bactrim [sulfamethoxazole-trimethoprim], Lyrica [pregabalin], Prednisone, and Sulfa antibiotics      Renal Function:  Recent Labs     12/23/24  1715   BUN 19   CREATININE 1.4*     No intake or output data in the 24 hours ending 12/25/24 1755    Vancomycin Monitoring:  Trough:  No results for input(s): \"VANCOTROUGH\" in the last 72 hours.  Random:  No results for input(s): \"VANCORANDOM\" in the last 72 hours.    Vancomycin Administration Times:  Recent vancomycin administrations        No vancomycin IV orders with administrations found.            Orders not given:            vancomycin (VANCOCIN) 1,500 mg in sodium chloride 0.9 % 250 mL IVPB    vancomycin (VANCOCIN) intermittent dosing (placeholder)                    Assessment:  Patient is a 89 y.o. female who has been initiated on vancomycin  Estimated Creatinine Clearance: 22 mL/min (A) (based on SCr of 1.4 mg/dL (H)).  To dose vancomycin, pharmacy will be utilizing dosing based off of levels because of patient's renal impairment/insufficiency    Plan:  Will continue to dose vancomycin based on levels  Will check vancomycin levels when appropriate  Will continue to monitor renal function   Pharmacy to follow      [unfilled]  DAREN: [phone]  SEY: [phone]  SJW: [phone]

## 2024-12-26 VITALS
RESPIRATION RATE: 18 BRPM | SYSTOLIC BLOOD PRESSURE: 140 MMHG | DIASTOLIC BLOOD PRESSURE: 88 MMHG | HEIGHT: 59 IN | BODY MASS INDEX: 33.06 KG/M2 | WEIGHT: 164 LBS | TEMPERATURE: 97.3 F | OXYGEN SATURATION: 94 % | HEART RATE: 84 BPM

## 2024-12-26 PROBLEM — E78.5 HYPERLIPIDEMIA: Status: ACTIVE | Noted: 2024-12-26

## 2024-12-26 PROBLEM — R78.81 POSITIVE BLOOD CULTURE: Status: ACTIVE | Noted: 2024-12-26

## 2024-12-26 PROBLEM — D69.6 THROMBOCYTOPENIA (HCC): Status: ACTIVE | Noted: 2024-12-26

## 2024-12-26 PROBLEM — F03.90 DEMENTIA (HCC): Status: ACTIVE | Noted: 2024-12-26

## 2024-12-26 LAB
ANION GAP SERPL CALCULATED.3IONS-SCNC: 11 MMOL/L (ref 7–16)
BASOPHILS # BLD: 0.02 K/UL (ref 0–0.2)
BASOPHILS NFR BLD: 0 % (ref 0–2)
BUN SERPL-MCNC: 20 MG/DL (ref 6–23)
CALCIUM SERPL-MCNC: 8.7 MG/DL (ref 8.6–10.2)
CHLORIDE SERPL-SCNC: 106 MMOL/L (ref 98–107)
CO2 SERPL-SCNC: 26 MMOL/L (ref 22–29)
CREAT SERPL-MCNC: 1.4 MG/DL (ref 0.5–1)
EOSINOPHIL # BLD: 0.03 K/UL (ref 0.05–0.5)
EOSINOPHILS RELATIVE PERCENT: 1 % (ref 0–6)
ERYTHROCYTE [DISTWIDTH] IN BLOOD BY AUTOMATED COUNT: 13.9 % (ref 11.5–15)
GFR, ESTIMATED: 36 ML/MIN/1.73M2
GLUCOSE SERPL-MCNC: 89 MG/DL (ref 74–99)
HCT VFR BLD AUTO: 33.4 % (ref 34–48)
HGB BLD-MCNC: 10.2 G/DL (ref 11.5–15.5)
IMM GRANULOCYTES # BLD AUTO: 0.03 K/UL (ref 0–0.58)
IMM GRANULOCYTES NFR BLD: 1 % (ref 0–5)
LYMPHOCYTES NFR BLD: 0.93 K/UL (ref 1.5–4)
LYMPHOCYTES RELATIVE PERCENT: 19 % (ref 20–42)
MCH RBC QN AUTO: 30.1 PG (ref 26–35)
MCHC RBC AUTO-ENTMCNC: 30.5 G/DL (ref 32–34.5)
MCV RBC AUTO: 98.5 FL (ref 80–99.9)
MONOCYTES NFR BLD: 0.75 K/UL (ref 0.1–0.95)
MONOCYTES NFR BLD: 15 % (ref 2–12)
NEUTROPHILS NFR BLD: 65 % (ref 43–80)
NEUTS SEG NFR BLD: 3.21 K/UL (ref 1.8–7.3)
PLATELET # BLD AUTO: 46 K/UL (ref 130–450)
PLATELET CONFIRMATION: NORMAL
PMV BLD AUTO: 12.4 FL (ref 7–12)
POTASSIUM SERPL-SCNC: 3.8 MMOL/L (ref 3.5–5)
RBC # BLD AUTO: 3.39 M/UL (ref 3.5–5.5)
SODIUM SERPL-SCNC: 143 MMOL/L (ref 132–146)
VANCOMYCIN SERPL-MCNC: 12.3 UG/ML (ref 5–40)
WBC OTHER # BLD: 5 K/UL (ref 4.5–11.5)

## 2024-12-26 PROCEDURE — 85025 COMPLETE CBC W/AUTO DIFF WBC: CPT

## 2024-12-26 PROCEDURE — 99239 HOSP IP/OBS DSCHRG MGMT >30: CPT | Performed by: INTERNAL MEDICINE

## 2024-12-26 PROCEDURE — 36415 COLL VENOUS BLD VENIPUNCTURE: CPT

## 2024-12-26 PROCEDURE — 6370000000 HC RX 637 (ALT 250 FOR IP): Performed by: STUDENT IN AN ORGANIZED HEALTH CARE EDUCATION/TRAINING PROGRAM

## 2024-12-26 PROCEDURE — G0378 HOSPITAL OBSERVATION PER HR: HCPCS

## 2024-12-26 PROCEDURE — 80202 ASSAY OF VANCOMYCIN: CPT

## 2024-12-26 PROCEDURE — 2500000003 HC RX 250 WO HCPCS: Performed by: STUDENT IN AN ORGANIZED HEALTH CARE EDUCATION/TRAINING PROGRAM

## 2024-12-26 PROCEDURE — 80048 BASIC METABOLIC PNL TOTAL CA: CPT

## 2024-12-26 RX ORDER — VANCOMYCIN 1 G/200ML
1000 INJECTION, SOLUTION INTRAVENOUS ONCE
Status: DISCONTINUED | OUTPATIENT
Start: 2024-12-26 | End: 2024-12-26

## 2024-12-26 RX ADMIN — TORSEMIDE 5 MG: 10 TABLET ORAL at 09:51

## 2024-12-26 RX ADMIN — CLOPIDOGREL BISULFATE 75 MG: 75 TABLET ORAL at 09:50

## 2024-12-26 RX ADMIN — MICONAZOLE NITRATE: 20 CREAM TOPICAL at 09:50

## 2024-12-26 RX ADMIN — SODIUM CHLORIDE, PRESERVATIVE FREE 10 ML: 5 INJECTION INTRAVENOUS at 09:51

## 2024-12-26 RX ADMIN — ASPIRIN 325 MG: 325 TABLET ORAL at 05:09

## 2024-12-26 RX ADMIN — PANTOPRAZOLE SODIUM 40 MG: 40 TABLET, DELAYED RELEASE ORAL at 05:09

## 2024-12-26 RX ADMIN — SERTRALINE 25 MG: 50 TABLET, FILM COATED ORAL at 09:50

## 2024-12-26 NOTE — PROGRESS NOTES
4 Eyes Skin Assessment     NAME:  Areltte Ramon  YOB: 1929  MEDICAL RECORD NUMBER:  [id number]    The patient is being assessed for  Admission    I agree that at least one RN has performed a thorough Head to Toe Skin Assessment on the patient. ALL assessment sites listed below have been assessed.      Areas assessed by both nurses:    Head, Face, Ears, Shoulders, Back, Chest, Arms, Elbows, Hands, Sacrum. Buttock, Coccyx, Ischium, Legs. Feet and Heels, and Under Medical Devices         Does the Patient have a Wound? Yes wound(s) were present on assessment. LDA wound assessment was Initiated and completed by RN       Mello Prevention initiated by RN: Yes  Wound Care Orders initiated by RN: Yes    Pressure Injury (Stage 3,4, Unstageable, DTI, NWPT, and Complex wounds) if present, place Wound referral order by RN under : Yes    New Ostomies, if present place, Ostomy referral order under : Yes     Nurse 1 eSignature: Electronically signed by Natalie Langford RN on 12/25/24 at 10:59 PM EST    **SHARE this note so that the co-signing nurse can place an eSignature**    Nurse 2 eSignature: Electronically signed by Maycol Carter RN on 12/25/24 at 11:01 PM EST

## 2024-12-26 NOTE — PROGRESS NOTES
Pharmacy Consultation Note  (Antibiotic Dosing and Monitoring)    Initial consult date: 12/25/24  Consulting physician/provider: Dr Vyas  Drug: Vancomycin  Indication: bloodstream    Age/  Gender Height Weight IBW  Allergy Information   89 y.o./female @FLOW(11:first:1)@ @FLOW[14:FIRST:1@     Ideal body weight: 48.8 kg (107 lb 8.4 oz)  Adjusted ideal body weight: 59 kg (130 lb 1.8 oz)   Bactrim [sulfamethoxazole-trimethoprim], Lyrica [pregabalin], Prednisone, and Sulfa antibiotics      Renal Function:  Recent Labs     12/23/24  1715 12/25/24  1733 12/26/24  0648   BUN 19 22 20   CREATININE 1.4* 1.4* 1.4*       Intake/Output Summary (Last 24 hours) at 12/26/2024 0934  Last data filed at 12/26/2024 0530  Gross per 24 hour   Intake 360 ml   Output --   Net 360 ml       Vancomycin Monitoring:  Trough:  No results for input(s): \"VANCOTROUGH\" in the last 72 hours.  Random:    Recent Labs     12/26/24  0648   VANCORANDOM 12.3       Vancomycin Administration Times:  Recent vancomycin administrations        No vancomycin IV orders with administrations found.            Orders not given:            vancomycin (VANCOCIN) 1,500 mg in sodium chloride 0.9 % 250 mL IVPB    vancomycin (VANCOCIN) intermittent dosing (placeholder)                    Assessment:  Patient is a 89 y.o. female who has been initiated on vancomycin  Estimated Creatinine Clearance: 22 mL/min (A) (based on SCr of 1.4 mg/dL (H)).  To dose vancomycin, pharmacy will be utilizing dosing based off of levels because of patient's renal impairment/insufficiency  Vancomycin level on 12/26 was 12.3 mcg/mL.    Plan:  Vancomycin 1000 mg IV x 1 now.  Will continue to dose vancomycin based on levels  Vancomycin random level ordered with morning labs on 12/27.  Will assess vancomycin level and serum creatinine on 12/27 to determine future vancomycin dosing plans.  Will continue to monitor renal function   Pharmacy to follow      [unfilled]  DAREN: [phone]  SEY: [phone]  SJW:

## 2024-12-26 NOTE — PLAN OF CARE
Problem: Discharge Planning  Goal: Discharge to home or other facility with appropriate resources  12/26/2024 1114 by Tammie Galeana, RN  Outcome: Progressing  12/25/2024 2255 by Natalie Langford RN  Outcome: Progressing     Problem: Pain  Goal: Verbalizes/displays adequate comfort level or baseline comfort level  12/26/2024 1114 by Tammie Galeana, RN  Outcome: Progressing  12/25/2024 2255 by Natalie Langford, RN  Outcome: Progressing     Problem: Skin/Tissue Integrity  Goal: Absence of new skin breakdown  Description: 1.  Monitor for areas of redness and/or skin breakdown  2.  Assess vascular access sites hourly  3.  Every 4-6 hours minimum:  Change oxygen saturation probe site  4.  Every 4-6 hours:  If on nasal continuous positive airway pressure, respiratory therapy assess nares and determine need for appliance change or resting period.  12/26/2024 1114 by Tammie Galeana, RN  Outcome: Progressing  12/25/2024 2255 by Natalie Langford, RN  Outcome: Progressing     Problem: Safety - Adult  Goal: Free from fall injury  12/26/2024 1114 by Tammie Galeana, RN  Outcome: Progressing  12/25/2024 2255 by Natalie Langford, RN  Outcome: Progressing

## 2024-12-26 NOTE — PROGRESS NOTES
Patient is active with [facility].    Electronically signed by Breana Trinidad RN on 12/26/2024 at 11:15 AM

## 2024-12-26 NOTE — ED PROVIDER NOTES
[facility] MED SURG  EMERGENCY DEPARTMENT ENCOUNTER        Pt Name: Areltte Ramon  MRN: [id number]  Birthdate 2/13/1929  Date of evaluation: 12/25/2024  Provider: Sue Vincent MD  PCP: No primary care provider on file.  Note Started: 7:15 PM EST 12/25/24    CHIEF COMPLAINT       Chief Complaint   Patient presents with    Positive Blood Cultures     Called to come back in for positive blood cultures, was here on Monday        HISTORY OF PRESENT ILLNESS: 1 or more Elements   History From: Patient    Limitations to history : None    Arlette Ramon is a 89 y.o. female who presents for positive blood cultures.  Patient had been seen 2 days ago for sacral wound check.  Workup had been unremarkable and patient was stable and discharged home on oral doxycycline and Omnicef.  Blood cultures grew MRSA so patient was called back today.  She has no additional symptoms.  No new fevers.    Nursing Notes were all reviewed and agreed with or any disagreements were addressed in the HPI.        REVIEW OF EXTERNAL NOTE :       Patient was last seen on 12/23/2024 for wound of sacral region    REVIEW OF SYSTEMS :           Positives and Pertinent negatives as per HPI.     SURGICAL HISTORY     Past Surgical History:   Procedure Laterality Date    BARTHOLIN GLAND CYST EXCISION      CARPAL TUNNEL RELEASE      CATARACT REMOVAL      COLECTOMY      with colostomy    DIAGNOSTIC CARDIAC CATH LAB PROCEDURE      drug eluting stent proximal RCA and diagonal branch    HYSTERECTOMY (CERVIX STATUS UNKNOWN)      LUMBAR LAMINECTOMY         CURRENTMEDICATIONS       Current Discharge Medication List        CONTINUE these medications which have NOT CHANGED    Details   torsemide (DEMADEX) 10 MG tablet Take 0.5 tablets by mouth 2 times daily      HYDROcodone-acetaminophen (NORCO) 5-325 MG per tablet Take 1 tablet by mouth every 12 hours as needed for Pain. Max Daily Amount: 2 tablets      sertraline (ZOLOFT) 25 MG tablet Take 1 tablet by mouth

## 2024-12-26 NOTE — ACP (ADVANCE CARE PLANNING)
Advance Care Planning   Healthcare Decision Maker:    Primary Decision Maker: Judy Ramon - Child - [phone]    Secondary Decision Maker: Teodoro Gill - Child - [phone]    Click here to complete Healthcare Decision Makers including selection of the Healthcare Decision Maker Relationship (ie \"Primary\").

## 2024-12-26 NOTE — PROGRESS NOTES
[facility] Hospitalist   Progress Note    Admitting Date and Time: 12/25/2024  4:54 PM  Admit Dx: MRSA bacteremia [R78.81, B95.62]  Bacteremia due to Gram-positive bacteria [R78.81]    Subjective:    Patient was admitted with MRSA bacteremia [R78.81, B95.62]  Bacteremia due to Gram-positive bacteria [R78.81]. Patient denies fever, chills, cp, sob, n/v.     sodium chloride flush  5-40 mL IntraVENous 2 times per day    enoxaparin  30 mg SubCUTAneous Daily    aspirin  325 mg Oral QAM AC    clopidogrel  75 mg Oral Daily    donepezil  10 mg Oral Nightly    DULoxetine  30 mg Oral Daily    miconazole   Topical BID    pantoprazole  40 mg Oral QAM AC    sertraline  25 mg Oral Daily    torsemide  5 mg Oral Daily    rosuvastatin  10 mg Oral Nightly     sodium chloride flush, 5-40 mL, PRN  sodium chloride, , PRN  ondansetron, 4 mg, Q8H PRN   Or  ondansetron, 4 mg, Q6H PRN  polyethylene glycol, 17 g, Daily PRN  acetaminophen, 650 mg, Q6H PRN   Or  acetaminophen, 650 mg, Q6H PRN  sulfur hexafluoride microspheres, 2 mL, ONCE PRN  HYDROcodone-acetaminophen, 1 tablet, Q12H PRN  albuterol, 2.5 mg, 4x Daily PRN         Objective:    BP (!) 125/56   Pulse 71   Temp 97.9 °F (36.6 °C) (Oral)   Resp 18   Ht 1.499 m (4' 11\")   Wt 74.4 kg (164 lb)   SpO2 95%   BMI 33.12 kg/m²   Skin: warm and dry, no rash or erythema  Pulmonary/Chest: clear to auscultation bilaterally- no wheezes, rales or rhonchi, normal air movement, no respiratory distress  Cardiovascular: rhythm reg at rate if 72  Abdomen: soft, non-tender, non-distended, normal bowel sounds, no masses or organomegaly  Extremities: no cyanosis, no clubbing, and no edema      Recent Labs     12/23/24  1715 12/25/24  1733 12/26/24  0648    142 143   K 4.3 4.3 3.8    103 106   CO2 28 27 26   BUN 19 22 20   CREATININE 1.4* 1.4* 1.4*   GLUCOSE 99 133* 89   CALCIUM 9.4 9.3 8.7       Recent Labs     12/23/24  1715 12/25/24  1733 12/26/24  0648   WBC 5.8 7.1

## 2024-12-26 NOTE — CONSULTS
rhonchi.   Cardiovascular: S1 and S2 are rhythmic and regular. No murmurs appreciated.   Abdomen: Positive bowel sounds to auscultation. Benign to palpation. No masses felt. No hepatosplenomegaly.  Extremities: No clubbing, no cyanosis, no edema.  Lines: Peripheral.        Radiology:  Noted    Microbiology:  Blood cultures 12/23/2024: Staph epidermidis in 1 bottle  Blood cultures 12/25/2024: Negative so far    Assessment:  Staph epidermidis bacteremia in 1 out of 4 bottles is a contaminant.  She has no intravascular devices.  Sacral decubitus ulcer, does not appear clinically infected.    Plan:    Antibiotics are not warranted  No need to treat contamination and blood culture.  No need to repeat blood cultures  Okay to discharge from ID standpoint    Thank you for having us see this patient in consultation. I will be discussing this case with the treating physicians or communicated through the electronic health record.    LILIA Camacho - CNP  10:39 AM  12/26/2024    Patient seen and examined. I had a face to face encounter with the patient.  Assessment and plan as outlined above and directed by me. Addition and corrections were done as deemed appropriate.  Patient not known to the ID service.  She was admitted to the hospital because there was a blood culture that turned positive for Staphylococcus epidermidis.  It was MRSA (Staphylococcus aureus with a mecA gene but rather MRSE with a coag negative staph).  The patient has no intravascular devices, artificial heart valves or anything to think that this was a true positive.  This can be a disregarded as a a contaminant.  Blood cultures did not need to be repeated.  Be a contaminant in the blood does not need to be treated.  She has been appropriately discharged.    Wily Trinh MD  12/26/2024  1:03 PM

## 2024-12-26 NOTE — CARE COORDINATION
Social work / Discharge planning:         Discharge order written.   Patient is active with [facility].   Referral sent via Epic.     Resumption of care order written.   Electronically signed by TJ Ocampo on 12/26/2024 at 11:46 AM

## 2024-12-28 LAB
MICROORGANISM SPEC CULT: NORMAL
SERVICE CMNT-IMP: NORMAL
SPECIMEN DESCRIPTION: NORMAL

## 2024-12-29 LAB
MICROORGANISM SPEC CULT: NORMAL
MICROORGANISM SPEC CULT: NORMAL
SERVICE CMNT-IMP: NORMAL
SERVICE CMNT-IMP: NORMAL
SPECIMEN DESCRIPTION: NORMAL
SPECIMEN DESCRIPTION: NORMAL
